# Patient Record
Sex: FEMALE | Race: WHITE | NOT HISPANIC OR LATINO | Employment: FULL TIME | URBAN - METROPOLITAN AREA
[De-identification: names, ages, dates, MRNs, and addresses within clinical notes are randomized per-mention and may not be internally consistent; named-entity substitution may affect disease eponyms.]

---

## 2017-01-19 ENCOUNTER — APPOINTMENT (OUTPATIENT)
Dept: LAB | Facility: HOSPITAL | Age: 62
End: 2017-01-19
Attending: PSYCHIATRY & NEUROLOGY
Payer: COMMERCIAL

## 2017-01-19 DIAGNOSIS — G35 MULTIPLE SCLEROSIS (HCC): ICD-10-CM

## 2017-01-19 LAB
BUN SERPL-MCNC: 15 MG/DL (ref 5–25)
CREAT SERPL-MCNC: 0.62 MG/DL (ref 0.6–1.3)
GFR SERPL CREATININE-BSD FRML MDRD: >60 ML/MIN/1.73SQ M

## 2017-01-19 PROCEDURE — 36415 COLL VENOUS BLD VENIPUNCTURE: CPT

## 2017-01-19 PROCEDURE — 84520 ASSAY OF UREA NITROGEN: CPT

## 2017-01-19 PROCEDURE — 82565 ASSAY OF CREATININE: CPT

## 2017-01-23 ENCOUNTER — HOSPITAL ENCOUNTER (OUTPATIENT)
Dept: RADIOLOGY | Facility: HOSPITAL | Age: 62
Discharge: HOME | End: 2017-01-23
Attending: PSYCHIATRY & NEUROLOGY
Payer: COMMERCIAL

## 2017-01-23 DIAGNOSIS — G35 MULTIPLE SCLEROSIS (HCC): ICD-10-CM

## 2017-01-23 PROCEDURE — A9585 GADOBUTROL INJECTION: HCPCS | Performed by: PSYCHIATRY & NEUROLOGY

## 2017-01-23 PROCEDURE — 72157 MRI CHEST SPINE W/O & W/DYE: CPT

## 2017-01-23 RX ADMIN — GADOBUTROL 8 ML: 604.72 INJECTION INTRAVENOUS at 12:04

## 2017-01-28 ENCOUNTER — TRANSCRIBE ORDERS (OUTPATIENT)
Dept: LAB | Facility: HOSPITAL | Age: 62
End: 2017-01-28

## 2017-01-28 ENCOUNTER — APPOINTMENT (OUTPATIENT)
Dept: LAB | Facility: HOSPITAL | Age: 62
End: 2017-01-28
Payer: COMMERCIAL

## 2017-01-28 DIAGNOSIS — G53 CRANIAL NERVE DISORDERS IN DISEASES CLASSIFIED ELSEWHERE: Primary | ICD-10-CM

## 2017-01-28 DIAGNOSIS — E78.00 PURE HYPERCHOLESTEROLEMIA: ICD-10-CM

## 2017-01-28 DIAGNOSIS — G53 CRANIAL NERVE DISORDERS IN DISEASES CLASSIFIED ELSEWHERE: ICD-10-CM

## 2017-01-28 LAB
25(OH)D3 SERPL-MCNC: 41.4 NG/ML (ref 30–100)
ALBUMIN SERPL BCP-MCNC: 3.9 G/DL (ref 3.5–5)
ALP SERPL-CCNC: 71 U/L (ref 46–116)
ALT SERPL W P-5'-P-CCNC: 48 U/L (ref 12–78)
ANION GAP SERPL CALCULATED.3IONS-SCNC: 7 MMOL/L (ref 4–13)
AST SERPL W P-5'-P-CCNC: 26 U/L (ref 5–45)
BASOPHILS # BLD AUTO: 0.02 THOUSANDS/ΜL (ref 0–0.1)
BASOPHILS NFR BLD AUTO: 0 % (ref 0–1)
BILIRUB SERPL-MCNC: 0.92 MG/DL (ref 0.2–1)
BUN SERPL-MCNC: 12 MG/DL (ref 5–25)
CALCIUM SERPL-MCNC: 8.9 MG/DL (ref 8.3–10.1)
CHLORIDE SERPL-SCNC: 104 MMOL/L (ref 100–108)
CHOLEST SERPL-MCNC: 214 MG/DL (ref 50–200)
CO2 SERPL-SCNC: 31 MMOL/L (ref 21–32)
CREAT SERPL-MCNC: 0.7 MG/DL (ref 0.6–1.3)
EOSINOPHIL # BLD AUTO: 0.11 THOUSAND/ΜL (ref 0–0.61)
EOSINOPHIL NFR BLD AUTO: 2 % (ref 0–6)
ERYTHROCYTE [DISTWIDTH] IN BLOOD BY AUTOMATED COUNT: 12.5 % (ref 11.6–15.1)
GFR SERPL CREATININE-BSD FRML MDRD: >60 ML/MIN/1.73SQ M
GLUCOSE SERPL-MCNC: 104 MG/DL (ref 65–140)
HCT VFR BLD AUTO: 41 % (ref 34.8–46.1)
HDLC SERPL-MCNC: 70 MG/DL (ref 40–60)
HGB BLD-MCNC: 14 G/DL (ref 11.5–15.4)
LDLC SERPL CALC-MCNC: 127 MG/DL (ref 0–100)
LYMPHOCYTES # BLD AUTO: 1.74 THOUSANDS/ΜL (ref 0.6–4.47)
LYMPHOCYTES NFR BLD AUTO: 37 % (ref 14–44)
MCH RBC QN AUTO: 32.6 PG (ref 26.8–34.3)
MCHC RBC AUTO-ENTMCNC: 34.1 G/DL (ref 31.4–37.4)
MCV RBC AUTO: 96 FL (ref 82–98)
MONOCYTES # BLD AUTO: 0.33 THOUSAND/ΜL (ref 0.17–1.22)
MONOCYTES NFR BLD AUTO: 7 % (ref 4–12)
NEUTROPHILS # BLD AUTO: 2.46 THOUSANDS/ΜL (ref 1.85–7.62)
NEUTS SEG NFR BLD AUTO: 54 % (ref 43–75)
NRBC BLD AUTO-RTO: 0 /100 WBCS
PLATELET # BLD AUTO: 237 THOUSANDS/UL (ref 149–390)
PMV BLD AUTO: 10 FL (ref 8.9–12.7)
POTASSIUM SERPL-SCNC: 4.2 MMOL/L (ref 3.5–5.3)
PROT SERPL-MCNC: 7.4 G/DL (ref 6.4–8.2)
RBC # BLD AUTO: 4.29 MILLION/UL (ref 3.81–5.12)
SODIUM SERPL-SCNC: 142 MMOL/L (ref 136–145)
TRIGL SERPL-MCNC: 87 MG/DL
TSH SERPL DL<=0.05 MIU/L-ACNC: 2.21 UIU/ML (ref 0.36–3.74)
WBC # BLD AUTO: 4.67 THOUSAND/UL (ref 4.31–10.16)

## 2017-01-28 PROCEDURE — 36415 COLL VENOUS BLD VENIPUNCTURE: CPT

## 2017-01-28 PROCEDURE — 82306 VITAMIN D 25 HYDROXY: CPT

## 2017-01-28 PROCEDURE — 85025 COMPLETE CBC W/AUTO DIFF WBC: CPT

## 2017-01-28 PROCEDURE — 80061 LIPID PANEL: CPT

## 2017-01-28 PROCEDURE — 84443 ASSAY THYROID STIM HORMONE: CPT

## 2017-01-28 PROCEDURE — 80053 COMPREHEN METABOLIC PANEL: CPT

## 2017-01-30 ENCOUNTER — ALLSCRIPTS OFFICE VISIT (OUTPATIENT)
Dept: OTHER | Facility: OTHER | Age: 62
End: 2017-01-30

## 2017-07-31 ENCOUNTER — ALLSCRIPTS OFFICE VISIT (OUTPATIENT)
Dept: OTHER | Facility: OTHER | Age: 62
End: 2017-07-31

## 2017-07-31 ENCOUNTER — TRANSCRIBE ORDERS (OUTPATIENT)
Dept: ADMINISTRATIVE | Facility: HOSPITAL | Age: 62
End: 2017-07-31

## 2017-07-31 DIAGNOSIS — R51 HEADACHE(784.0): ICD-10-CM

## 2017-07-31 DIAGNOSIS — R51.9 FACIAL PAIN: Primary | ICD-10-CM

## 2017-08-12 ENCOUNTER — TRANSCRIBE ORDERS (OUTPATIENT)
Dept: LAB | Facility: HOSPITAL | Age: 62
End: 2017-08-12

## 2017-08-12 ENCOUNTER — APPOINTMENT (OUTPATIENT)
Dept: LAB | Facility: HOSPITAL | Age: 62
End: 2017-08-12
Payer: COMMERCIAL

## 2017-08-12 DIAGNOSIS — E78.00 PURE HYPERCHOLESTEROLEMIA: Primary | ICD-10-CM

## 2017-08-12 DIAGNOSIS — I10 ESSENTIAL HYPERTENSION, MALIGNANT: ICD-10-CM

## 2017-08-12 DIAGNOSIS — E78.00 PURE HYPERCHOLESTEROLEMIA: ICD-10-CM

## 2017-08-12 DIAGNOSIS — R51 HEADACHE(784.0): ICD-10-CM

## 2017-08-12 LAB
ANION GAP SERPL CALCULATED.3IONS-SCNC: 5 MMOL/L (ref 4–13)
BUN SERPL-MCNC: 19 MG/DL (ref 5–25)
CALCIUM SERPL-MCNC: 8.9 MG/DL (ref 8.3–10.1)
CHLORIDE SERPL-SCNC: 103 MMOL/L (ref 100–108)
CHOLEST SERPL-MCNC: 198 MG/DL (ref 50–200)
CO2 SERPL-SCNC: 30 MMOL/L (ref 21–32)
CREAT SERPL-MCNC: 0.6 MG/DL (ref 0.6–1.3)
ERYTHROCYTE [SEDIMENTATION RATE] IN BLOOD: 5 MM/HOUR (ref 0–20)
GFR SERPL CREATININE-BSD FRML MDRD: 97 ML/MIN/1.73SQ M
GLUCOSE P FAST SERPL-MCNC: 90 MG/DL (ref 65–99)
HDLC SERPL-MCNC: 54 MG/DL (ref 40–60)
LDLC SERPL CALC-MCNC: 129 MG/DL (ref 0–100)
POTASSIUM SERPL-SCNC: 4.4 MMOL/L (ref 3.5–5.3)
SODIUM SERPL-SCNC: 138 MMOL/L (ref 136–145)
TRIGL SERPL-MCNC: 73 MG/DL

## 2017-08-12 PROCEDURE — 80061 LIPID PANEL: CPT

## 2017-08-12 PROCEDURE — 85652 RBC SED RATE AUTOMATED: CPT

## 2017-08-12 PROCEDURE — 80048 BASIC METABOLIC PNL TOTAL CA: CPT

## 2017-08-12 PROCEDURE — 36415 COLL VENOUS BLD VENIPUNCTURE: CPT

## 2018-01-13 VITALS
HEIGHT: 60 IN | SYSTOLIC BLOOD PRESSURE: 148 MMHG | WEIGHT: 181 LBS | RESPIRATION RATE: 16 BRPM | DIASTOLIC BLOOD PRESSURE: 75 MMHG | BODY MASS INDEX: 35.53 KG/M2 | HEART RATE: 75 BPM

## 2018-01-14 VITALS
RESPIRATION RATE: 16 BRPM | SYSTOLIC BLOOD PRESSURE: 126 MMHG | BODY MASS INDEX: 35.14 KG/M2 | HEIGHT: 60 IN | DIASTOLIC BLOOD PRESSURE: 74 MMHG | HEART RATE: 72 BPM | WEIGHT: 179 LBS

## 2018-01-30 ENCOUNTER — OFFICE VISIT (OUTPATIENT)
Dept: NEUROLOGY | Facility: CLINIC | Age: 63
End: 2018-01-30
Payer: COMMERCIAL

## 2018-01-30 VITALS
WEIGHT: 180.2 LBS | HEIGHT: 60 IN | RESPIRATION RATE: 16 BRPM | BODY MASS INDEX: 35.38 KG/M2 | HEART RATE: 72 BPM | SYSTOLIC BLOOD PRESSURE: 126 MMHG | DIASTOLIC BLOOD PRESSURE: 78 MMHG

## 2018-01-30 DIAGNOSIS — G35 MULTIPLE SCLEROSIS (HCC): Primary | ICD-10-CM

## 2018-01-30 DIAGNOSIS — F07.81 POSTCONCUSSION SYNDROME: ICD-10-CM

## 2018-01-30 PROCEDURE — 99214 OFFICE O/P EST MOD 30 MIN: CPT | Performed by: PSYCHIATRY & NEUROLOGY

## 2018-01-30 RX ORDER — CHLORAL HYDRATE 500 MG
CAPSULE ORAL 2 TIMES DAILY
COMMUNITY

## 2018-01-30 RX ORDER — LANSOPRAZOLE 30 MG/1
30 CAPSULE, DELAYED RELEASE ORAL DAILY
COMMUNITY
Start: 2015-10-26

## 2018-01-30 RX ORDER — IBUPROFEN 600 MG/1
600 TABLET ORAL EVERY 4 HOURS PRN
COMMUNITY
Start: 2015-05-14

## 2018-01-30 RX ORDER — ATROPA BELLADONNA, EUPHRASIA STRICTA AND MERCURIC CHLORIDE 6; 6; 6 [HP_X]/10ML; [HP_X]/10ML; [HP_X]/10ML
SOLUTION/ DROPS OPHTHALMIC 2 TIMES DAILY
COMMUNITY

## 2018-01-30 RX ORDER — LORATADINE 10 MG/1
10 CAPSULE, LIQUID FILLED ORAL AS NEEDED
COMMUNITY

## 2018-01-30 RX ORDER — FLUTICASONE PROPIONATE 50 MCG
SPRAY, SUSPENSION (ML) NASAL
COMMUNITY
Start: 2013-07-19 | End: 2020-01-30

## 2018-01-30 RX ORDER — CYANOCOBALAMIN (VITAMIN B-12) 500 MCG
LOZENGE ORAL
COMMUNITY

## 2018-01-30 RX ORDER — ACETAMINOPHEN 160 MG
2000 TABLET,DISINTEGRATING ORAL DAILY
COMMUNITY

## 2018-01-30 RX ORDER — METOPROLOL SUCCINATE 25 MG/1
25 TABLET, EXTENDED RELEASE ORAL
COMMUNITY

## 2018-01-30 RX ORDER — CYCLOSPORINE 0.5 MG/ML
0.05 EMULSION OPHTHALMIC 2 TIMES DAILY
COMMUNITY
Start: 2008-07-17

## 2018-01-30 RX ORDER — MAGNESIUM CARB/ALUMINUM HYDROX 105-160MG
TABLET,CHEWABLE ORAL DAILY
COMMUNITY

## 2018-01-30 RX ORDER — ALBUTEROL SULFATE 90 UG/1
1 AEROSOL, METERED RESPIRATORY (INHALATION) EVERY 4 HOURS PRN
COMMUNITY
Start: 2014-11-26

## 2018-01-30 RX ORDER — ACETYLCYST/METHYLB12/LEVOMEFOL 600-2-6 MG
TABLET ORAL DAILY
Refills: 1 | COMMUNITY
Start: 2018-01-09

## 2018-01-30 RX ORDER — CHROMIUM 200 MCG
TABLET ORAL
COMMUNITY
End: 2020-01-30

## 2018-01-30 RX ORDER — DULOXETIN HYDROCHLORIDE 60 MG/1
CAPSULE, DELAYED RELEASE ORAL
COMMUNITY
Start: 2013-10-01 | End: 2019-03-14 | Stop reason: ALTCHOICE

## 2018-01-30 NOTE — PROGRESS NOTES
Patient ID: Joseph Alvarez is a 57 y o  female  Assessment/Plan:    Multiple sclerosis ([facility] [address] )  Pt here for neuro follow up  Pt last seen in July 2017  Pt had updated imaging since last visit  Pt had updted mri c spine in aug 2017, no evidence of cervical cord myelopathy, extensive spondylosis with progression comp to the prior study  Mri brain also done in aug 2017, stable as well comp to sept 2015  Pt also recently seen by chiropractor and feels her neck is much improved with decreased pain the trapezius region  Pt to call for any new sxs    Postconcussion syndrome  Pt to call for any new sxs  Pt notes headaches improved  Pt now with better sleeping and eating also helping and increasing energy level       Diagnoses and all orders for this visit:    Multiple sclerosis ([facility] [address] )    Postconcussion syndrome    Other orders  -     albuterol (VENTOLIN HFA) 90 mcg/act inhaler; Reported on 12/7/2016   -     Jyivsucwz-Swjchvlkx-Gqwimdkojs (METAFOLBIC PLUS) 6-2-600 MG TABS; TK 1 T PO D  -     Cholecalciferol 2000 units CAPS;   -     cycloSPORINE (RESTASIS) 0 05 % ophthalmic emulsion; Apply 0 05 % to eye 2 (two) times a day  -     DULoxetine (CYMBALTA) 60 mg delayed release capsule; Take by mouth  -     fluticasone (FLONASE) 50 mcg/act nasal spray; into each nostril  -     Glucosamine-Chondroitin 750-600 MG TABS; Take by mouth  -     ibuprofen (MOTRIN) 600 mg tablet; Take by mouth  -     ACIDOPHILUS LACTOBACILLUS PO;   -     lansoprazole (PREVACID) 30 mg capsule; 15 mg  -     L-Lysine 500 MG CAPS; Take by mouth  -     Loratadine 10 MG CAPS; Take by mouth as needed  -     metoprolol succinate (TOPROL-XL) 25 mg 24 hr tablet; Take by mouth  -     Multiple Vitamins-Minerals (MULTIVITAMIN ADULT PO);   -     Omega-3 1000 MG CAPS;  Take by mouth  -     Homeopathic Products (SIMILASAN DRY EYE RELIEF) SOLN; Apply to eye  -     Vitamin E 400 units TABS; Take by mouth  -     Digestive Enzymes (DIGESTIVE ENZYME ULTRA PO); by Does not apply route  -     Cholecalciferol (VITAMIN D3) 2000 units capsule; Take by mouth  -     Turmeric Curcumin 500 MG CAPS; Take by mouth           Subjective:    HPI  HPI: 58 y/o female presents for neurologic follow up, last seen July 2017  Patient with PMH of post concussive syndrome due to multiple injuries in the past, MS with a fairly benign course  She is not on any IMD therapy  Patient with memory and concentration difficulties  Re rev prior  MRI c-spine from January 2016 compared to Nov 2014  Stable MR appearance of the cervical cord  Minor prominence of the central canal  No pathological enhancement  Mild cervical spondylosis and osteoarthritis  Re rev last MRI t-spine on 1/23/17 compared to 9/2015  Stable appearance of the thoracic cord  Prominent central canal T7-T9  No indication of demyelinating disease  Re rev MRI brain done in Sept 2015 and stable compared to Nov 2014 with mild degree of chronic demyelinating disease  Today, patient reports she is overall doing well  She remains off IMD therapy for her MS  Patient reports mental status and memory are about the same  She continues with word finding difficulty, slower cognition  She notes overall improved  sleep hygiene  She is working on better sleep hygiene , getting into her bed and trying to avoid distractions  Pt also trying to expand her social [place] as well ,  She just visited long time friend in [facility]  Pt notes overall doing better since last visit  Pt also recently seen by chiropractor who helped her with her trapezius spams  Headaches improved since last visit as well  No falls or trips  No changes in vision  No change in speech or swallowing  No loc  Once she goes to bed, she falls asleep easily and always stays asleep  Patient denies any changes in bowel or bladder  No diplopia  No loss of vision  No fever or chills           The following portions of the patient's history were reviewed and updated as appropriate: allergies, current medications, past family history, past medical history, past social history, past surgical history and problem list          Objective:    Blood pressure 126/78, pulse 72, resp  rate 16, height 5' (1 524 m), weight 81 7 kg (180 lb 3 2 oz)  Physical Exam   Constitutional: She appears well-developed and well-nourished  Eyes: EOM are normal  Pupils are equal, round, and reactive to light  Cardiovascular: Intact distal pulses  Neurological: She has normal strength  Gait normal        Neurological Exam    Mental Status  The patient is alert and oriented to person, place, time, and situation  Her recent and remote memory are normal  Her language is fluent with no aphasia  She has a normal fund of knowledge  Cranial Nerves    CN II: The patient's visual acuity and visual fields are normal   CN III, IV, VI: The patient's pupils are equally round and reactive to light and ocular movements are normal   CN V: The patient has normal facial sensation  CN VII:  The patient has symmetric facial movement  CN VIII:  The patient's hearing is normal   CN IX, X: The patient has symmetric palate movement and normal gag reflex  CN XI: The patient's shoulder shrug strength is normal   CN XII: The patient's tongue is midline without atrophy or fasciculations  Motor  The patient has normal muscle bulk throughout  Her overall muscle tone is normal throughout  Her strength is 5/5 throughout all four extremities  Sensory  The patient's sensation is normal in all four extremities to light touch, pinprick, temperature, vibration and proprioception  Gait and Coordination  The patient has normal gait and station  She has normal right heel to shin and normal left heel to shin coordination  She has normal right finger to nose and normal left finger to nose coordination  ROS:    Review of Systems   Constitutional: Negative  Negative for appetite change and fever  HENT: Positive for tinnitus and trouble swallowing  Negative for hearing loss and voice change  Eyes: Negative for photophobia and pain  Dry eyes   Respiratory: Positive for shortness of breath  Cardiovascular: Negative  Negative for palpitations  Gastrointestinal: Negative  Negative for nausea and vomiting  Endocrine: Negative  Negative for cold intolerance and heat intolerance  Genitourinary: Negative  Negative for dysuria, frequency and urgency  Musculoskeletal: Positive for arthralgias, gait problem and neck pain  Negative for myalgias  Clumsiness   Skin: Negative  Negative for rash  Neurological: Positive for light-headedness and headaches  Negative for dizziness, tremors, seizures, syncope, facial asymmetry, speech difficulty, weakness and numbness  Hematological: Negative  Does not bruise/bleed easily  Psychiatric/Behavioral: Positive for sleep disturbance  Negative for confusion and hallucinations          Memory problems  snoring

## 2018-01-30 NOTE — ASSESSMENT & PLAN NOTE
Pt to call for any new sxs  Pt notes headaches improved  Pt now with better sleeping and eating also helping and increasing energy level

## 2018-01-30 NOTE — PATIENT INSTRUCTIONS
Multiple sclerosis ([facility] [address] )  Pt here for neuro follow up  Pt last seen in July 2017  Pt had updated imaging since last visit  Pt had updted mri c spine in aug 2017, no evidence of cervical cord myelopathy, extensive spondylosis with progression comp to the prior study  Mri brain also done in aug 2017, stable as well comp to sept 2015  Pt also recently seen by chiropractor and feels her neck is much improved with decreased pain the trapezius region  Pt to call for any new sxs    Postconcussion syndrome  Pt to call for any new sxs  Pt notes headaches improved  Pt now with better sleeping and eating also helping and increasing energy level

## 2018-01-30 NOTE — ASSESSMENT & PLAN NOTE
Pt here for neuro follow up  Pt last seen in July 2017  Pt had updated imaging since last visit  Pt had updted mri c spine in aug 2017, no evidence of cervical cord myelopathy, extensive spondylosis with progression comp to the prior study  Mri brain also done in aug 2017, stable as well comp to sept 2015  Pt also recently seen by chiropractor and feels her neck is much improved with decreased pain the trapezius region  Pt to call for any new sxs

## 2018-03-06 ENCOUNTER — TELEPHONE (OUTPATIENT)
Dept: NEUROLOGY | Facility: CLINIC | Age: 63
End: 2018-03-06

## 2018-03-06 NOTE — TELEPHONE ENCOUNTER
May need to discuss with PCP  Looks like Dr Mendy Gomes just saw her about a month ago and all stable, even mentions a lot had improved  We can't put her out on disability for fear of losing her job due to layoffs if [address] has been stable
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
## 2018-03-08 NOTE — TELEPHONE ENCOUNTER
Pt states that she agrees, and that she just wanted to give us a heads up because she is applying for social security because she is almost 70, so we may be receiving a request for records  Can close

## 2019-01-08 ENCOUNTER — OFFICE VISIT (OUTPATIENT)
Dept: NEUROLOGY | Facility: CLINIC | Age: 64
End: 2019-01-08
Payer: COMMERCIAL

## 2019-01-08 ENCOUNTER — OFFICE VISIT (OUTPATIENT)
Dept: URGENT CARE | Facility: MEDICAL CENTER | Age: 64
End: 2019-01-08
Payer: COMMERCIAL

## 2019-01-08 VITALS
HEART RATE: 82 BPM | WEIGHT: 181 LBS | HEIGHT: 60 IN | SYSTOLIC BLOOD PRESSURE: 180 MMHG | DIASTOLIC BLOOD PRESSURE: 90 MMHG | BODY MASS INDEX: 35.53 KG/M2

## 2019-01-08 VITALS
OXYGEN SATURATION: 97 % | RESPIRATION RATE: 20 BRPM | SYSTOLIC BLOOD PRESSURE: 152 MMHG | TEMPERATURE: 99.4 F | HEART RATE: 84 BPM | DIASTOLIC BLOOD PRESSURE: 90 MMHG

## 2019-01-08 DIAGNOSIS — G47.09 OTHER INSOMNIA: Primary | ICD-10-CM

## 2019-01-08 DIAGNOSIS — G35 MULTIPLE SCLEROSIS (HCC): ICD-10-CM

## 2019-01-08 DIAGNOSIS — R41.3 AMNESIA/MEMORY DISORDER: ICD-10-CM

## 2019-01-08 DIAGNOSIS — F31.81 BIPOLAR 2 DISORDER (HCC): ICD-10-CM

## 2019-01-08 DIAGNOSIS — I10 HYPERTENSION, UNSPECIFIED TYPE: Primary | ICD-10-CM

## 2019-01-08 PROCEDURE — 99214 OFFICE O/P EST MOD 30 MIN: CPT | Performed by: PSYCHIATRY & NEUROLOGY

## 2019-01-08 PROCEDURE — G0383 LEV 4 HOSP TYPE B ED VISIT: HCPCS | Performed by: FAMILY MEDICINE

## 2019-01-08 NOTE — PROGRESS NOTES
[facility]        NAME: Fantasma Ngo is a 70 y o  female  : 1955    MRN: [id number]  DATE: 2019  TIME: 6:00 PM    Assessment and Plan   Hypertension, unspecified type [I10]  1  Hypertension, unspecified type       Manual blood pressure here was 172/88  Patient was told to take the blood pressure medication  Pressure was rechecked in 15 min and her new blood pressure was 152/90  Patient remains asymptomatic  Patient is a follow-up of primary care physician appointment this Friday  Patient was asked to follow up for hypertension and systolic murmur  Patient Instructions       Follow up with PCP in 3-5 days  Proceed to  ER if symptoms worsen  Chief Complaint     Chief Complaint   Patient presents with    Hypertension     sent from neuro for high blood pressure         History of Present Illness       78-year-old female with history of hypertension and multiple sclerosis presents with high blood pressure  She did not take her metoprolol today because she was busy running from doctor to doctor appointment  She was at Urology office were pressure was noted to be high and was sent here for assessment  She denies any symptoms however states that she "feels the high blood pressure "  She can't explain what that means  She denies any lightheadedness dizziness headaches  Denies any neurological symptoms like changes in her vision changes in her hearing slurring of her speech  She denies any chest pain, palpitations shortness of breath          Review of Systems   Review of Systems  As above    Current Medications       Current Outpatient Prescriptions:     ACIDOPHILUS LACTOBACILLUS PO, , Disp: , Rfl:     albuterol (VENTOLIN HFA) 90 mcg/act inhaler, Reported on 2016 , Disp: , Rfl:     Cholecalciferol (VITAMIN D3) 2000 units capsule, Take by mouth, Disp: , Rfl:     Cholecalciferol 2000 units CAPS, , Disp: , Rfl:     cycloSPORINE (RESTASIS) 0 05 % ophthalmic emulsion, Apply 0 05 % to eye 2 (two) times a day, Disp: , Rfl:     Digestive Enzymes (DIGESTIVE ENZYME ULTRA PO), by Does not apply route, Disp: , Rfl:     DULoxetine (CYMBALTA) 60 mg delayed release capsule, Take by mouth, Disp: , Rfl:     fluticasone (FLONASE) 50 mcg/act nasal spray, into each nostril, Disp: , Rfl:     Glucosamine-Chondroitin 750-600 MG TABS, Take by mouth, Disp: , Rfl:     Homeopathic Products (SIMILASAN DRY EYE RELIEF) SOLN, Apply to eye, Disp: , Rfl:     ibuprofen (MOTRIN) 600 mg tablet, Take by mouth, Disp: , Rfl:     L-Lysine 500 MG CAPS, Take by mouth, Disp: , Rfl:     lansoprazole (PREVACID) 30 mg capsule, 15 mg, Disp: , Rfl:     Loratadine 10 MG CAPS, Take by mouth as needed, Disp: , Rfl:     Tlybuwjuv-Yhesykngw-Yepfpvfnlk (METAFOLBIC PLUS) 6-2-600 MG TABS, TK 1 T PO D, Disp: , Rfl: 1    metoprolol succinate (TOPROL-XL) 25 mg 24 hr tablet, Take by mouth, Disp: , Rfl:     Multiple Vitamins-Minerals (MULTIVITAMIN ADULT PO), , Disp: , Rfl:     Omega-3 1000 MG CAPS, Take by mouth, Disp: , Rfl:     Turmeric Curcumin 500 MG CAPS, Take by mouth, Disp: , Rfl:     Vitamin E 400 units TABS, Take by mouth, Disp: , Rfl:     Current Allergies     Allergies as of 01/08/2019 - Reviewed 01/08/2019   Allergen Reaction Noted    Bee venom  12/02/2015    Latex  12/16/2013    Sulfa antibiotics  12/02/2015            The following portions of the patient's history were reviewed and updated as appropriate: allergies, current medications, past family history, past medical history, past social history, past surgical history and problem list      No past medical history on file  No past surgical history on file  No family history on file  Medications have been verified  Objective   BP (!) 172/88   Pulse 84   Temp 99 4 °F (37 4 °C)   Resp 20   SpO2 97%        Physical Exam     Physical Exam   Constitutional: She is oriented to person, place, and time   She appears well-developed and well-nourished  HENT:   Head: Normocephalic and atraumatic  Mouth/Throat: Oropharynx is clear and moist    Eyes: Conjunctivae are normal    Neck: Neck supple  Cardiovascular: Normal rate and regular rhythm  Murmur heard  Pulmonary/Chest: Effort normal and breath sounds normal  No respiratory distress  She has no wheezes  She has no rales  Abdominal: Soft  Musculoskeletal: Normal range of motion  Neurological: She is alert and oriented to person, place, and time  Skin: Skin is warm and dry  Psychiatric: She has a normal mood and affect  Her behavior is normal    Nursing note and vitals reviewed

## 2019-01-08 NOTE — ASSESSMENT & PLAN NOTE
Pt here for neuro follow up  Pt notes overall no new sxs over the past yr  Pt last seen in jan 2018  Pt still working 8-9 hours daily  Pt notes working at same place for 14 yrs  Pt with increased diff with maintaince of sleep  Pt notes sleeping only about 2-3 hours per night  Pt recommended to see sleep medicine  Pt needs sleep consultation plus probable testing  Pt also volunteering at her [employer] the community  Pt exam stable  Pt recommended to do PT   Pt has a gym at her home but too hot on the 3rd floor at times  Pt now with central air, rec resumption of exercise  Also offered PT referral  Pt to continue with seeing therapist as well  Pt told she is likely hypomanic from bipolar type 2  Also rec pt to see psychiatry  Pt with elevated bp times 2 in office today at 180/90  Pt did not take her afternoon metoprolol today  Unsure how long bp has been elevated, rec pt to be seen at [facility] today right across the road for prompter evaluation  Pt is seeing her pcp on Friday but rec this issue to be addressed now as late in afternoon

## 2019-01-08 NOTE — PATIENT INSTRUCTIONS
Multiple sclerosis ([facility] [address] )  Pt here for neuro follow up  Pt notes overall no new sxs over the past yr  Pt last seen in jan 2018  Pt still working 8-9 hours daily  Pt notes working at same place for 14 yrs  Pt with increased diff with maintaince of sleep  Pt notes sleeping only about 2-3 hours per night  Pt recommended to see sleep medicine  Pt needs sleep consultation plus probable testing  Pt also volunteering at her [employer] the community  Pt exam stable  Pt recommended to do PT   Pt has a gym at her home but too hot on the 3rd floor at times  Pt now with central air, rec resumption of exercise  Also offered PT referral

## 2019-01-09 ENCOUNTER — TELEPHONE (OUTPATIENT)
Dept: NEUROLOGY | Facility: CLINIC | Age: 64
End: 2019-01-09
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
## 2019-01-09 NOTE — TELEPHONE ENCOUNTER
Pt seen in office last gabe and sent to [facility] for her elevated bp  Pt also with probable bipolar disorder per her therapist   We see her for her MS  Pt recommended to see psychiatry as well  Please help to coordinate getting pt to see psychiatry  Also please tell pt I placed an order for neurocogntive eval with dr Patrice Mckeon for re eval of her memory due to her therapist recommendations, her ms and concussion in past   Please check with pt if she had a prior one done  Not sure if in allrxs? ?  But it would be helpful to compare  Also remind her to follow up with pcp due to her bp issue last gabe  Pt has an appt on Friday with pcp

## 2019-01-14 ENCOUNTER — APPOINTMENT (OUTPATIENT)
Dept: LAB | Facility: HOSPITAL | Age: 64
End: 2019-01-14
Payer: COMMERCIAL

## 2019-01-14 ENCOUNTER — TRANSCRIBE ORDERS (OUTPATIENT)
Dept: LAB | Facility: HOSPITAL | Age: 64
End: 2019-01-14

## 2019-01-14 DIAGNOSIS — E78.00 PURE HYPERCHOLESTEROLEMIA: ICD-10-CM

## 2019-01-14 DIAGNOSIS — G35 MULTIPLE SCLEROSIS (HCC): ICD-10-CM

## 2019-01-14 DIAGNOSIS — E78.00 PURE HYPERCHOLESTEROLEMIA: Primary | ICD-10-CM

## 2019-01-14 LAB
25(OH)D3 SERPL-MCNC: 48.4 NG/ML (ref 30–100)
ALBUMIN SERPL BCP-MCNC: 4 G/DL (ref 3.5–5)
ALP SERPL-CCNC: 64 U/L (ref 46–116)
ALT SERPL W P-5'-P-CCNC: 26 U/L (ref 12–78)
ANION GAP SERPL CALCULATED.3IONS-SCNC: 5 MMOL/L (ref 4–13)
AST SERPL W P-5'-P-CCNC: 18 U/L (ref 5–45)
BASOPHILS # BLD AUTO: 0.05 THOUSANDS/ΜL (ref 0–0.1)
BASOPHILS NFR BLD AUTO: 1 % (ref 0–1)
BILIRUB SERPL-MCNC: 0.86 MG/DL (ref 0.2–1)
BUN SERPL-MCNC: 21 MG/DL (ref 5–25)
CALCIUM SERPL-MCNC: 9.1 MG/DL (ref 8.3–10.1)
CHLORIDE SERPL-SCNC: 102 MMOL/L (ref 100–108)
CHOLEST SERPL-MCNC: 188 MG/DL (ref 50–200)
CO2 SERPL-SCNC: 32 MMOL/L (ref 21–32)
CREAT SERPL-MCNC: 0.76 MG/DL (ref 0.6–1.3)
EOSINOPHIL # BLD AUTO: 0.16 THOUSAND/ΜL (ref 0–0.61)
EOSINOPHIL NFR BLD AUTO: 3 % (ref 0–6)
ERYTHROCYTE [DISTWIDTH] IN BLOOD BY AUTOMATED COUNT: 11.7 % (ref 11.6–15.1)
GFR SERPL CREATININE-BSD FRML MDRD: 83 ML/MIN/1.73SQ M
GLUCOSE P FAST SERPL-MCNC: 89 MG/DL (ref 65–99)
HCT VFR BLD AUTO: 39.4 % (ref 34.8–46.1)
HDLC SERPL-MCNC: 58 MG/DL (ref 40–60)
HGB BLD-MCNC: 13 G/DL (ref 11.5–15.4)
IMM GRANULOCYTES # BLD AUTO: 0.01 THOUSAND/UL (ref 0–0.2)
IMM GRANULOCYTES NFR BLD AUTO: 0 % (ref 0–2)
LDLC SERPL CALC-MCNC: 118 MG/DL (ref 0–100)
LYMPHOCYTES # BLD AUTO: 2.25 THOUSANDS/ΜL (ref 0.6–4.47)
LYMPHOCYTES NFR BLD AUTO: 39 % (ref 14–44)
MCH RBC QN AUTO: 32.3 PG (ref 26.8–34.3)
MCHC RBC AUTO-ENTMCNC: 33 G/DL (ref 31.4–37.4)
MCV RBC AUTO: 98 FL (ref 82–98)
MONOCYTES # BLD AUTO: 0.42 THOUSAND/ΜL (ref 0.17–1.22)
MONOCYTES NFR BLD AUTO: 7 % (ref 4–12)
NEUTROPHILS # BLD AUTO: 2.94 THOUSANDS/ΜL (ref 1.85–7.62)
NEUTS SEG NFR BLD AUTO: 50 % (ref 43–75)
NONHDLC SERPL-MCNC: 130 MG/DL
NRBC BLD AUTO-RTO: 0 /100 WBCS
PLATELET # BLD AUTO: 207 THOUSANDS/UL (ref 149–390)
PMV BLD AUTO: 10.1 FL (ref 8.9–12.7)
POTASSIUM SERPL-SCNC: 4.5 MMOL/L (ref 3.5–5.3)
PROT SERPL-MCNC: 7 G/DL (ref 6.4–8.2)
RBC # BLD AUTO: 4.02 MILLION/UL (ref 3.81–5.12)
SODIUM SERPL-SCNC: 139 MMOL/L (ref 136–145)
TRIGL SERPL-MCNC: 58 MG/DL
TSH SERPL DL<=0.05 MIU/L-ACNC: 1.87 UIU/ML (ref 0.36–3.74)
WBC # BLD AUTO: 5.83 THOUSAND/UL (ref 4.31–10.16)

## 2019-01-14 PROCEDURE — 82306 VITAMIN D 25 HYDROXY: CPT

## 2019-01-14 PROCEDURE — 36415 COLL VENOUS BLD VENIPUNCTURE: CPT

## 2019-01-14 PROCEDURE — 84443 ASSAY THYROID STIM HORMONE: CPT

## 2019-01-14 PROCEDURE — 80061 LIPID PANEL: CPT

## 2019-01-14 PROCEDURE — 85025 COMPLETE CBC W/AUTO DIFF WBC: CPT

## 2019-01-14 PROCEDURE — 80053 COMPREHEN METABOLIC PANEL: CPT

## 2019-01-18 ENCOUNTER — OFFICE VISIT (OUTPATIENT)
Dept: SLEEP CENTER | Facility: CLINIC | Age: 64
End: 2019-01-18
Payer: COMMERCIAL

## 2019-01-18 VITALS
WEIGHT: 181 LBS | HEIGHT: 60 IN | HEART RATE: 64 BPM | SYSTOLIC BLOOD PRESSURE: 160 MMHG | DIASTOLIC BLOOD PRESSURE: 80 MMHG | BODY MASS INDEX: 35.53 KG/M2

## 2019-01-18 DIAGNOSIS — R06.83 SNORING: Primary | ICD-10-CM

## 2019-01-18 DIAGNOSIS — G47.19 EXCESSIVE DAYTIME SLEEPINESS: ICD-10-CM

## 2019-01-18 DIAGNOSIS — Z72.820 SLEEP DEPRIVATION: ICD-10-CM

## 2019-01-18 DIAGNOSIS — G47.09 OTHER INSOMNIA: ICD-10-CM

## 2019-01-18 DIAGNOSIS — I10 HYPERTENSION, UNSPECIFIED TYPE: ICD-10-CM

## 2019-01-18 PROCEDURE — 99244 OFF/OP CNSLTJ NEW/EST MOD 40: CPT | Performed by: PSYCHIATRY & NEUROLOGY

## 2019-01-18 NOTE — PATIENT INSTRUCTIONS
Recommendations:  1) In lab diagnostic Polysomnography   2) Driving safety was reviewed with patient  If the patient feels too sleepy to drive she knows not to drive  If she becomes sleepy while driving she will pull over and nap  3) Follow-up 6-8 weeks after initiating treatment if needed  4) Call with any questions or concerns  5) sleep extension 7-8 hours a night    Recommendations for sleep hygiene:    1) Get up at the same time seven days out of the week  2) Only go to bed when feeling sleepy  3) Wind down in the evening without electronics  4) Stimulus Control: If lying in bed for 15-20 minutes (estimated because the clock is turned away so you cannot see it) and you are not asleep get up and do something relaxing in a different room (reading a magazine article, solitaire with a deck of cards)  Do this in the middle of the night as well if awake  Avoid doing work or getting on the computer  5) Bedroom for sleep only  No watching TV or using electronics (computer, phone, tablet etc )  in bed  6) Turn clock away so you cannot see it in bed  7) Exercise regularly but try to avoid exercise within 4 hours of bedtime  Morning exercise is best     8) Avoid caffeine in the afternoon  Considering tapering down on caffeine by decreasing by one beverage with caffeine every 3 days until off  9) Avoid smoking near bedtime    10) Avoid alcohol before bed  If you consume one alcoholic beverage allow 3 hours between that drink and bedtime  If you consume two alcoholic beverages allow 5 hours  Between those drinks and bedtime  Alcohol may lead to waking at night  11) Avoid napping except for driving safety  If you feel to sleepy to drive do not drive  If you get sleepy while driving pull over and nap  You may resume driving once you feel alert  12) Read "No More Sleepless Nights" by Jose Vanegas PhD     13) There are some on-line resources that do require a fee that can be of help  Two credible websites are as follows:  http://Patient Feed/cbt-online-insomnia-treatment html  [employer] si  An johnny used by the [place] is as follows:  CBT-I   Go! To Sleep by the [facility]
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
## 2019-03-13 DIAGNOSIS — R06.83 SNORING: Primary | ICD-10-CM

## 2019-03-14 ENCOUNTER — OFFICE VISIT (OUTPATIENT)
Dept: PSYCHIATRY | Facility: CLINIC | Age: 64
End: 2019-03-14
Payer: COMMERCIAL

## 2019-03-14 DIAGNOSIS — F31.81 BIPOLAR 2 DISORDER (HCC): Primary | ICD-10-CM

## 2019-03-14 PROBLEM — F31.10 BIPOLAR AFFECTIVE, MANIC (HCC): Status: ACTIVE | Noted: 2019-03-14

## 2019-03-14 PROBLEM — F30.8 HYPOMANIA (HCC): Status: ACTIVE | Noted: 2019-03-14

## 2019-03-14 PROCEDURE — 99214 OFFICE O/P EST MOD 30 MIN: CPT | Performed by: NURSE PRACTITIONER

## 2019-03-14 RX ORDER — ARIPIPRAZOLE 5 MG/1
5 TABLET ORAL DAILY
Qty: 30 TABLET | Refills: 2 | Status: SHIPPED | OUTPATIENT
Start: 2019-03-14 | End: 2019-03-21

## 2019-03-14 RX ORDER — CLONAZEPAM 0.5 MG/1
0.25 TABLET ORAL 2 TIMES DAILY
Qty: 30 TABLET | Refills: 0 | Status: SHIPPED | OUTPATIENT
Start: 2019-03-14 | End: 2019-04-02 | Stop reason: SDUPTHER

## 2019-03-14 RX ORDER — ARIPIPRAZOLE 2 MG/1
TABLET ORAL
Qty: 30 TABLET | Refills: 0 | Status: SHIPPED | OUTPATIENT
Start: 2019-03-14 | End: 2019-03-14 | Stop reason: DRUGHIGH

## 2019-03-14 RX ORDER — DULOXETIN HYDROCHLORIDE 30 MG/1
30 CAPSULE, DELAYED RELEASE ORAL DAILY
Qty: 30 CAPSULE | Refills: 0 | OUTPATIENT
Start: 2019-03-14 | End: 2019-03-21 | Stop reason: ALTCHOICE

## 2019-03-14 RX ORDER — ESCITALOPRAM OXALATE 5 MG/1
5 TABLET ORAL DAILY
Qty: 30 TABLET | Refills: 0 | Status: SHIPPED | OUTPATIENT
Start: 2019-03-14 | End: 2019-03-14 | Stop reason: ALTCHOICE

## 2019-03-14 NOTE — PSYCH
[address]    Name and Date of Birth:  Jason Nesbitt 70 y o  1955    Date of Visit: 03/14/19    Reason for visit:   Chief Complaint   Patient presents with    Anxiety    Mood Swings    Behavior Issues    Medication Management     HPI patient is a 47-year-old female who is referred by her family doctor and Neurology  She presents today hypomanic  She is pressured and tangential speech, she is not able to keep a thought in order and she is having racing intrusive thoughts  My suspicion is that she is on Cymbalta 60 mg daily and this is for history of depression but there is also history of bipolar disorder and I believe she is bipolar to manic at this time  So we are going to eliminate Cymbalta I am starting her on Lexapro 5 mg daily along with the Abilify 2 mg daily and bring her back in 1 week for further evaluation and to see if her in the right direction with the medications  Reason were starting with low-dose Abilify is because she tells me I am sensitive to all meds and   She has been on Seroquel 50 mg at bedtime and she is reporting multiple side effects from that medication  She is able to function at work and she is able to drive a vehicle and she had no problems with getting here with directions  Sleep has been an issue for obvious reasons  There is no agitation there is no aggression evident  Jolanta Arias is a 70 y o  female with a history of bipolar disorder type II who presents for psychiatric evaluation due to manic symptoms  Primary complaints include MANIC SYMPTOMS: elevated mood, racing thoughts  Symptoms first started gradual starting several days ago and gradually worsening over the last several days  Stressors preceding visit included chronic mental illness  Jolanta Arias presents with hypomania which needs treatment    Cymbalta seems to be the culprit in increasing the hypomania sore going to discontinue that and were going to start trial of low-dose Lexapro and low-dose Abilify and evaluate in 1 week       She denies suicidal ideation, intent or plan at present, denies passive death wish recently  She denies auditory hallucinations, denies visual hallucinations, denies delusions  She denies any side effects from medications  Servando Patricia HPI ROS Appetite Changes and Sleep: normal sleep, fluctuating sleep pattern, normal appetite, normal energy level    Psychiatric Review Of Systems:    Sleep changes: yes  Appetite changes: no  Weight changes: no  Energy/anergy: increased  Interest/pleasure/anhedonia: increased  Somatic symptoms: no  Anxiety/panic: yes  Jessica: yes  Guilty/hopeless: no  Self injurious behavior/risky behavior: no  Suicidal ideation: no  Homicidal ideation: no  Auditory hallucinations: no  Visual hallucinations: no  Other hallucinations: no  Delusional thinking: no  Eating disorder history: no  Obsessive/compulsive symptoms: compulsions    Review Of Systems:    Mood Euphoria   Behavior Impulsive Behavior   Thought Content Racing   General Bipolar 2   Personality Normal   Other Psych Symptoms No   Constitutional negative   ENT negative   Cardiovascular negative   Respiratory negative   Gastrointestinal negative   Genitourinary negative   Musculoskeletal negative   Integumentary negative   Neurological negative   Endocrine negative   Other Symptoms negative and none       Past Psychiatric History:     Past Inpatient Psychiatric Treatment:   No history of past inpatient psychiatric admissions  Past Outpatient Psychiatric Treatment:    No history of past outpatient psychiatric treatment  Past Suicide Attempts: no  Past Violent Behavior: no  Past Psychiatric Medication Trials: Cymbalta    Family Psychiatric History:     No family history on file      Social History     Substance and Sexual Activity   Drug Use No     Social History     Socioeconomic History    Marital status: Single     Spouse name: Not on file    Number of children: Not on file    Years of education: Not on file    Highest education level: Not on file   Occupational History    Not on file   Social Needs    Financial resource strain: Not on file    Food insecurity:     Worry: Not on file     Inability: Not on file    Transportation needs:     Medical: Not on file     Non-medical: Not on file   Tobacco Use    Smoking status: Never Smoker    Smokeless tobacco: Never Used   Substance and Sexual Activity    Alcohol use: Yes     Comment: very infrequent    Drug use: No    Sexual activity: Not on file   Lifestyle    Physical activity:     Days per week: Not on file     Minutes per session: Not on file    Stress: Not on file   Relationships    Social connections:     Talks on phone: Not on file     Gets together: Not on file     Attends Restoration service: Not on file     Active member of club or organization: Not on file     Attends meetings of clubs or organizations: Not on file     Relationship status: Not on file    Intimate partner violence:     Fear of current or ex partner: Not on file     Emotionally abused: Not on file     Physically abused: Not on file     Forced sexual activity: Not on file   Other Topics Concern    Not on file   Social History Narrative    Not on file     Social History     Social History Narrative    Not on file       Traumatic History:     Abuse: None  Other Traumatic Events: none    History Review:    normal bulk    OBJECTIVE:     Mental Status Evaluation:    Appearance casually dressed   Behavior pleasant, cooperative   Speech increased rate   Mood hypomanic   Affect expansive   Thought Processes disorganized   Associations tangential associations   Thought Content obsessive thoughts   Perceptual Disturbances: none   Abnormal Thoughts  Risk Potential Suicidal ideation - None  Homicidal ideation - None  Potential for aggression - No   Orientation oriented to person, place and time/date   Memory recent and remote memory grossly intact   Cosciousness alert and awake   Attention Span poor concentration and poor attention span   Intellect Appears to be of Average Intelligence   Insight fair   Judgement fair   Muscle Strength and  Gait normal muscle strength and normal muscle tone, normal gait and normal balance   Language no difficulty naming common objects   Fund of Knowledge displays adequate knowledge of current events   Pain none   Pain Scale 0       Laboratory Results: No results found for this or any previous visit  Assessment/Plan:      Diagnoses and all orders for this visit:    Bipolar 2 disorder ([facility] [address] )  -     escitalopram (LEXAPRO) 5 mg tablet; Take 1 tablet (5 mg total) by mouth daily  -     ARIPiprazole (ABILIFY) 2 mg tablet; Take at 5 PM          Treatment Recommendations/Precautions:    Start Lexapro 5 mg daily  Abilify 2 mg daily  Follow-up in 1 weeks or sooner if necessary    Risks/Benefits      Risks, Benefits And Possible Side Effects Of Medications:    Risks, benefits, and possible side effects of medications explained to patient and patient verbalizes understanding and agreement for treatment      Controlled Medication Discussion:     Abida Fisher has been filling controlled prescriptions on time as prescribed according to [facility] P[facility]rez

## 2019-03-21 ENCOUNTER — OFFICE VISIT (OUTPATIENT)
Dept: PSYCHIATRY | Facility: CLINIC | Age: 64
End: 2019-03-21
Payer: COMMERCIAL

## 2019-03-21 DIAGNOSIS — F31.60 BIPOLAR 1 DISORDER, MIXED (HCC): Primary | ICD-10-CM

## 2019-03-21 PROBLEM — R47.89: Status: ACTIVE | Noted: 2019-03-21

## 2019-03-21 PROBLEM — F31.0 BIPOLAR AFFECTIVE DISORDER, CURRENT EPISODE HYPOMANIC (HCC): Status: ACTIVE | Noted: 2019-03-21

## 2019-03-21 PROBLEM — F31.10 BIPOLAR AFFECTIVE, MANIC (HCC): Status: RESOLVED | Noted: 2019-03-14 | Resolved: 2019-03-21

## 2019-03-21 PROBLEM — F30.8 HYPOMANIA (HCC): Status: RESOLVED | Noted: 2019-03-14 | Resolved: 2019-03-21

## 2019-03-21 PROCEDURE — 99214 OFFICE O/P EST MOD 30 MIN: CPT | Performed by: NURSE PRACTITIONER

## 2019-03-21 RX ORDER — ESCITALOPRAM OXALATE 5 MG/1
5 TABLET ORAL DAILY
Qty: 30 TABLET | Refills: 2 | OUTPATIENT
Start: 2019-03-21 | End: 2019-03-25 | Stop reason: ALTCHOICE

## 2019-03-21 RX ORDER — ARIPIPRAZOLE 2 MG/1
2 TABLET ORAL DAILY
Qty: 30 TABLET | Refills: 2 | OUTPATIENT
Start: 2019-03-21 | End: 2019-04-30 | Stop reason: SDUPTHER

## 2019-03-21 NOTE — PSYCH
PROGRESS NOTE        [facility]      Name and Date of Birth:  Wes Navas 70 y o  1955    Date of Visit: 03/21/19    SUBJECTIVE:    Myra Sacks presents today for treatment of bipolar depression  She had been on Cymbalta which was causing hypomania and she has titrated off the medication sooner than we had outlined but, she feels pretty good  She is sleeping better, she is functioning at work better, and her family is saying that this is significant improvement in her mood and her presentation  She did not start any of the other medication that we had discussed last week  She decided that she wanted to wait till she came in for this appointment  So today I gave her an outline to start Lexapro 5 mg daily, Abilify she will go with the 2 mg daily at her request   I explained to her that since her hypomania is under much better control, 2 mg may work but we may need to go up to 5 mg  She will continue with Klonopin 0 25 mg b i d  She will come back to see me in 3 weeks or sooner if necessary  She denies suicidal ideation, intent or plan at present, has no suicidal ideation, intent or plan at present  She denies any auditory hallucinations and visual hallucinations, denies any other delusional thinking, denies any delusional thinking  She denies any side effects from medications    HPI ROS Appetite Changes and Sleep: normal appetite, normal sleep    Review Of Systems:      Constitutional Negative   ENT Negative   Cardiovascular Negative   Respiratory As Noted in HPI   Gastrointestinal Negative   Genitourinary Negative   Musculoskeletal Negative   Integumentary Negative   Neurological Negative   Endocrine Negative   Other Symptoms Negative and None       Laboratory Results: No results found for this or any previous visit      Substance Abuse History:    Social History     Substance and Sexual Activity   Drug Use No       Family Psychiatric History: No family history on file      The following portions of the patient's history were reviewed and updated as appropriate: past family history, past medical history, past social history, past surgical history and problem list     Social History     Socioeconomic History    Marital status: Single     Spouse name: Not on file    Number of children: Not on file    Years of education: Not on file    Highest education level: Not on file   Occupational History    Not on file   Social Needs    Financial resource strain: Not on file    Food insecurity:     Worry: Not on file     Inability: Not on file    Transportation needs:     Medical: Not on file     Non-medical: Not on file   Tobacco Use    Smoking status: Never Smoker    Smokeless tobacco: Never Used   Substance and Sexual Activity    Alcohol use: Yes     Comment: very infrequent    Drug use: No    Sexual activity: Not on file   Lifestyle    Physical activity:     Days per week: Not on file     Minutes per session: Not on file    Stress: Not on file   Relationships    Social connections:     Talks on phone: Not on file     Gets together: Not on file     Attends Islam service: Not on file     Active member of club or organization: Not on file     Attends meetings of clubs or organizations: Not on file     Relationship status: Not on file    Intimate partner violence:     Fear of current or ex partner: Not on file     Emotionally abused: Not on file     Physically abused: Not on file     Forced sexual activity: Not on file   Other Topics Concern    Not on file   Social History Narrative    Not on file     Social History     Social History Narrative    Not on file        Social History     Tobacco History     Smoking Status  Never Smoker    Smokeless Tobacco Use  Never Used          Alcohol History     Alcohol Use Status  Yes Comment  very infrequent          Drug Use     Drug Use Status  No          Sexual Activity     Sexually Active  Not Asked Activities of Daily Living    Not Asked                     OBJECTIVE:     Mental Status Evaluation:    Appearance age appropriate, casually dressed   Behavior pleasant, cooperative   Speech normal volume, normal pitch   Mood improved   Affect Bright   Thought Processes Less disorganized   Associations intact associations   Thought Content Normal   Perceptual Disturbances: None   Abnormal Thoughts  Risk Potential Suicidal ideation - None  Homicidal ideation - None  Potential for aggression -No   Orientation oriented to person, place, time/date and situation   Memory recent and remote memory grossly intact   Cosciousness alert and awake   Attention Span attention span and concentration are age appropriate   Intellect Appears to be of Average Intelligence   Insight age appropriate and improved   Judgement good and improved   Muscle Strength and  Gait muscle strength and tone were normal   Language no difficulty naming common objects   Fund of Knowledge displays adequate knowledge of current events   Pain none   Pain Scale 0       Assessment/Plan:       Diagnoses and all orders for this visit:    Bipolar 1 disorder, mixed (HCC)  -     ARIPiprazole (ABILIFY) 2 mg tablet; Take 1 tablet (2 mg total) by mouth daily  -     escitalopram (LEXAPRO) 5 mg tablet; Take 1 tablet (5 mg total) by mouth daily          Treatment Recommendations/Precautions:    Continue current medications:  Abilify 2 mg daily  Lexapro 5 mg daily  Klonopin 0 25 mg b i d  Follow-up in 3 weeks or sooner if necessary  Risks/Benefits      Risks, Benefits And Possible Side Effects Of Medications:    Risks, benefits, and possible side effects of medications explained to patient and patient verbalizes understanding and agreement for treatment      Controlled Medication Discussion:     Not applicable    Psychotherapy Provided:     Individual psychotherapy provided: No

## 2019-03-25 ENCOUNTER — TELEPHONE (OUTPATIENT)
Dept: PSYCHIATRY | Facility: CLINIC | Age: 64
End: 2019-03-25

## 2019-03-29 ENCOUNTER — TELEPHONE (OUTPATIENT)
Dept: NEUROLOGY | Facility: CLINIC | Age: 64
End: 2019-03-29

## 2019-03-29 ENCOUNTER — TELEPHONE (OUTPATIENT)
Dept: PSYCHIATRY | Facility: CLINIC | Age: 64
End: 2019-03-29

## 2019-04-02 ENCOUNTER — OFFICE VISIT (OUTPATIENT)
Dept: PSYCHIATRY | Facility: CLINIC | Age: 64
End: 2019-04-02
Payer: COMMERCIAL

## 2019-04-02 DIAGNOSIS — F31.81 BIPOLAR 2 DISORDER (HCC): ICD-10-CM

## 2019-04-02 PROCEDURE — 99214 OFFICE O/P EST MOD 30 MIN: CPT | Performed by: NURSE PRACTITIONER

## 2019-04-02 RX ORDER — CLONAZEPAM 0.5 MG/1
0.25 TABLET ORAL 2 TIMES DAILY
Qty: 30 TABLET | Refills: 2 | Status: SHIPPED | OUTPATIENT
Start: 2019-04-02 | End: 2019-04-17

## 2019-04-04 ENCOUNTER — APPOINTMENT (OUTPATIENT)
Dept: LAB | Facility: HOSPITAL | Age: 64
End: 2019-04-04
Payer: COMMERCIAL

## 2019-04-04 ENCOUNTER — TRANSCRIBE ORDERS (OUTPATIENT)
Dept: ADMINISTRATIVE | Facility: HOSPITAL | Age: 64
End: 2019-04-04

## 2019-04-04 DIAGNOSIS — R82.90 ABNORMAL URINE FINDINGS: ICD-10-CM

## 2019-04-04 DIAGNOSIS — R22.40 LOCALIZED SWELLING, MASS, OR LUMP OF LOWER EXTREMITY, UNSPECIFIED LATERALITY: ICD-10-CM

## 2019-04-04 DIAGNOSIS — R22.40 LOCALIZED SWELLING, MASS, OR LUMP OF LOWER EXTREMITY, UNSPECIFIED LATERALITY: Primary | ICD-10-CM

## 2019-04-04 LAB
BACTERIA UR QL AUTO: ABNORMAL /HPF
BILIRUB UR QL STRIP: NEGATIVE
CLARITY UR: CLEAR
COLOR UR: YELLOW
GLUCOSE UR STRIP-MCNC: NEGATIVE MG/DL
HGB UR QL STRIP.AUTO: NEGATIVE
KETONES UR STRIP-MCNC: NEGATIVE MG/DL
LEUKOCYTE ESTERASE UR QL STRIP: ABNORMAL
NITRITE UR QL STRIP: NEGATIVE
NON-SQ EPI CELLS URNS QL MICRO: ABNORMAL /HPF
NT-PROBNP SERPL-MCNC: 46 PG/ML
PH UR STRIP.AUTO: 7.5 [PH]
PROT UR STRIP-MCNC: NEGATIVE MG/DL
RBC #/AREA URNS AUTO: ABNORMAL /HPF
SP GR UR STRIP.AUTO: 1.01 (ref 1–1.03)
UROBILINOGEN UR QL STRIP.AUTO: 0.2 E.U./DL
WBC #/AREA URNS AUTO: ABNORMAL /HPF

## 2019-04-04 PROCEDURE — 81001 URINALYSIS AUTO W/SCOPE: CPT | Performed by: INTERNAL MEDICINE

## 2019-04-04 PROCEDURE — 36415 COLL VENOUS BLD VENIPUNCTURE: CPT

## 2019-04-04 PROCEDURE — 83880 ASSAY OF NATRIURETIC PEPTIDE: CPT

## 2019-04-14 ENCOUNTER — HOSPITAL ENCOUNTER (OUTPATIENT)
Dept: SLEEP CENTER | Facility: CLINIC | Age: 64
Discharge: HOME | End: 2019-04-14
Payer: COMMERCIAL

## 2019-04-14 DIAGNOSIS — R06.83 SNORING: ICD-10-CM

## 2019-04-14 PROCEDURE — G0399 HOME SLEEP TEST/TYPE 3 PORTA: HCPCS | Performed by: PSYCHIATRY & NEUROLOGY

## 2019-04-14 PROCEDURE — G0399 HOME SLEEP TEST/TYPE 3 PORTA: HCPCS

## 2019-04-15 ENCOUNTER — APPOINTMENT (OUTPATIENT)
Dept: LAB | Facility: HOSPITAL | Age: 64
End: 2019-04-15
Payer: COMMERCIAL

## 2019-04-15 ENCOUNTER — TRANSCRIBE ORDERS (OUTPATIENT)
Dept: LAB | Facility: HOSPITAL | Age: 64
End: 2019-04-15

## 2019-04-15 ENCOUNTER — TELEPHONE (OUTPATIENT)
Dept: PSYCHIATRY | Facility: CLINIC | Age: 64
End: 2019-04-15

## 2019-04-15 DIAGNOSIS — Z51.81 ENCOUNTER FOR THERAPEUTIC DRUG MONITORING: Primary | ICD-10-CM

## 2019-04-15 DIAGNOSIS — Z51.81 ENCOUNTER FOR THERAPEUTIC DRUG MONITORING: ICD-10-CM

## 2019-04-15 LAB
ALBUMIN SERPL BCP-MCNC: 3.8 G/DL (ref 3.5–5)
ALP SERPL-CCNC: 75 U/L (ref 46–116)
ALT SERPL W P-5'-P-CCNC: 65 U/L (ref 12–78)
ANION GAP SERPL CALCULATED.3IONS-SCNC: 8 MMOL/L (ref 4–13)
AST SERPL W P-5'-P-CCNC: 26 U/L (ref 5–45)
BILIRUB SERPL-MCNC: 0.4 MG/DL (ref 0.2–1)
BUN SERPL-MCNC: 15 MG/DL (ref 5–25)
CALCIUM SERPL-MCNC: 9.7 MG/DL (ref 8.3–10.1)
CHLORIDE SERPL-SCNC: 103 MMOL/L (ref 100–108)
CO2 SERPL-SCNC: 29 MMOL/L (ref 21–32)
CREAT SERPL-MCNC: 0.63 MG/DL (ref 0.6–1.3)
EST. AVERAGE GLUCOSE BLD GHB EST-MCNC: 105 MG/DL
GFR SERPL CREATININE-BSD FRML MDRD: 94 ML/MIN/1.73SQ M
GLUCOSE SERPL-MCNC: 115 MG/DL (ref 65–140)
HBA1C MFR BLD: 5.3 % (ref 4.2–6.3)
POTASSIUM SERPL-SCNC: 4.5 MMOL/L (ref 3.5–5.3)
PROT SERPL-MCNC: 7.3 G/DL (ref 6.4–8.2)
SODIUM SERPL-SCNC: 140 MMOL/L (ref 136–145)

## 2019-04-15 PROCEDURE — 83036 HEMOGLOBIN GLYCOSYLATED A1C: CPT

## 2019-04-15 PROCEDURE — 80053 COMPREHEN METABOLIC PANEL: CPT

## 2019-04-15 PROCEDURE — 36415 COLL VENOUS BLD VENIPUNCTURE: CPT

## 2019-04-17 ENCOUNTER — OFFICE VISIT (OUTPATIENT)
Dept: PSYCHIATRY | Facility: CLINIC | Age: 64
End: 2019-04-17
Payer: COMMERCIAL

## 2019-04-17 DIAGNOSIS — F31.81 BIPOLAR 2 DISORDER (HCC): ICD-10-CM

## 2019-04-17 DIAGNOSIS — F31.62 BIPOLAR DISORDER, CURRENT EPISODE MIXED, MODERATE (HCC): Primary | ICD-10-CM

## 2019-04-17 DIAGNOSIS — G47.34 NOCTURNAL HYPOXEMIA: ICD-10-CM

## 2019-04-17 DIAGNOSIS — G47.33 OSA (OBSTRUCTIVE SLEEP APNEA): Primary | ICD-10-CM

## 2019-04-17 PROCEDURE — 99214 OFFICE O/P EST MOD 30 MIN: CPT | Performed by: NURSE PRACTITIONER

## 2019-04-17 RX ORDER — CLONAZEPAM 0.5 MG/1
0.25 TABLET ORAL 3 TIMES DAILY
Qty: 45 TABLET | Refills: 2 | Status: SHIPPED | OUTPATIENT
Start: 2019-04-17 | End: 2019-04-30 | Stop reason: DRUGHIGH

## 2019-04-18 ENCOUNTER — TELEPHONE (OUTPATIENT)
Dept: SLEEP CENTER | Facility: CLINIC | Age: 64
End: 2019-04-18

## 2019-04-19 ENCOUNTER — TELEPHONE (OUTPATIENT)
Dept: NEUROLOGY | Facility: CLINIC | Age: 64
End: 2019-04-19

## 2019-04-24 ENCOUNTER — TELEPHONE (OUTPATIENT)
Dept: PSYCHIATRY | Facility: CLINIC | Age: 64
End: 2019-04-24

## 2019-04-24 DIAGNOSIS — F31.60 BIPOLAR 1 DISORDER, MIXED (HCC): ICD-10-CM

## 2019-04-24 RX ORDER — ARIPIPRAZOLE 2 MG/1
2 TABLET ORAL DAILY
Qty: 30 TABLET | Refills: 2 | Status: SHIPPED | OUTPATIENT
Start: 2019-04-24 | End: 2019-04-30 | Stop reason: SDUPTHER

## 2019-04-30 ENCOUNTER — OFFICE VISIT (OUTPATIENT)
Dept: PSYCHIATRY | Facility: CLINIC | Age: 64
End: 2019-04-30
Payer: COMMERCIAL

## 2019-04-30 DIAGNOSIS — F31.81 BIPOLAR 2 DISORDER (HCC): Primary | ICD-10-CM

## 2019-04-30 DIAGNOSIS — F31.60 BIPOLAR 1 DISORDER, MIXED (HCC): ICD-10-CM

## 2019-04-30 PROCEDURE — 99213 OFFICE O/P EST LOW 20 MIN: CPT | Performed by: NURSE PRACTITIONER

## 2019-04-30 RX ORDER — ARIPIPRAZOLE 2 MG/1
2 TABLET ORAL DAILY
Qty: 30 TABLET | Refills: 2 | Status: SHIPPED | OUTPATIENT
Start: 2019-04-30 | End: 2019-07-23 | Stop reason: ALTCHOICE

## 2019-04-30 RX ORDER — CLONAZEPAM 0.5 MG/1
TABLET ORAL
Qty: 45 TABLET | Refills: 2 | Status: SHIPPED | OUTPATIENT
Start: 2019-04-30 | End: 2019-07-23 | Stop reason: ALTCHOICE

## 2019-06-10 ENCOUNTER — TELEPHONE (OUTPATIENT)
Dept: PSYCHIATRY | Facility: CLINIC | Age: 64
End: 2019-06-10

## 2019-06-21 ENCOUNTER — TELEPHONE (OUTPATIENT)
Dept: SLEEP CENTER | Facility: CLINIC | Age: 64
End: 2019-06-21

## 2019-06-25 ENCOUNTER — TELEPHONE (OUTPATIENT)
Dept: SLEEP CENTER | Facility: CLINIC | Age: 64
End: 2019-06-25

## 2019-06-25 DIAGNOSIS — G47.33 OSA (OBSTRUCTIVE SLEEP APNEA): Primary | ICD-10-CM

## 2019-06-25 DIAGNOSIS — G47.34 NOCTURNAL HYPOXEMIA: ICD-10-CM

## 2019-07-22 ENCOUNTER — TELEPHONE (OUTPATIENT)
Dept: PSYCHIATRY | Facility: CLINIC | Age: 64
End: 2019-07-22

## 2019-07-22 NOTE — TELEPHONE ENCOUNTER
Pt called/left msg stating she called awhile back in regards to stopping medication  Pt states she never heard back, so she weaned herself off of all her medications  Pt has an appt coming up on 7/30, but she does not feel she needs to come to it, due to not being on any medications and doing well  Pt can be reached at [phone]

## 2019-07-23 NOTE — TELEPHONE ENCOUNTER
I gave Ted Landau a call and we discussed her stopping her medications  Right now, she is using alternative methods such as exercise and changing her diet and as result, is feeling better  She is denying any breakthrough symptoms of blu or hypomania  She sounds good on the phone, there is no pressured speech than a noted an overall, she reports he is feeling well  She has stopped all her psychotropic medications so we will welcome her to give us a call in the future if needed

## 2019-08-10 ENCOUNTER — TRANSCRIBE ORDERS (OUTPATIENT)
Dept: LAB | Facility: HOSPITAL | Age: 64
End: 2019-08-10

## 2019-08-10 ENCOUNTER — APPOINTMENT (OUTPATIENT)
Dept: LAB | Facility: HOSPITAL | Age: 64
End: 2019-08-10
Payer: COMMERCIAL

## 2019-08-10 DIAGNOSIS — R19.5 ABNORMAL FECES: Primary | ICD-10-CM

## 2019-08-10 DIAGNOSIS — R19.5 ABNORMAL FECES: ICD-10-CM

## 2019-08-10 DIAGNOSIS — E78.00 PURE HYPERCHOLESTEROLEMIA: ICD-10-CM

## 2019-08-10 LAB
ALBUMIN SERPL BCP-MCNC: 3.7 G/DL (ref 3.5–5)
ALP SERPL-CCNC: 67 U/L (ref 46–116)
ALT SERPL W P-5'-P-CCNC: 63 U/L (ref 12–78)
ANION GAP SERPL CALCULATED.3IONS-SCNC: 5 MMOL/L (ref 4–13)
AST SERPL W P-5'-P-CCNC: 36 U/L (ref 5–45)
BASOPHILS # BLD AUTO: 0.04 THOUSANDS/ΜL (ref 0–0.1)
BASOPHILS NFR BLD AUTO: 1 % (ref 0–1)
BILIRUB SERPL-MCNC: 0.83 MG/DL (ref 0.2–1)
BUN SERPL-MCNC: 16 MG/DL (ref 5–25)
CALCIUM SERPL-MCNC: 9.2 MG/DL (ref 8.3–10.1)
CHLORIDE SERPL-SCNC: 105 MMOL/L (ref 100–108)
CHOLEST SERPL-MCNC: 190 MG/DL (ref 50–200)
CO2 SERPL-SCNC: 29 MMOL/L (ref 21–32)
CREAT SERPL-MCNC: 0.77 MG/DL (ref 0.6–1.3)
EOSINOPHIL # BLD AUTO: 0.08 THOUSAND/ΜL (ref 0–0.61)
EOSINOPHIL NFR BLD AUTO: 2 % (ref 0–6)
ERYTHROCYTE [DISTWIDTH] IN BLOOD BY AUTOMATED COUNT: 12.4 % (ref 11.6–15.1)
GFR SERPL CREATININE-BSD FRML MDRD: 81 ML/MIN/1.73SQ M
GLUCOSE P FAST SERPL-MCNC: 102 MG/DL (ref 65–99)
HCT VFR BLD AUTO: 39.4 % (ref 34.8–46.1)
HDLC SERPL-MCNC: 51 MG/DL (ref 40–60)
HGB BLD-MCNC: 13.3 G/DL (ref 11.5–15.4)
IMM GRANULOCYTES # BLD AUTO: 0.02 THOUSAND/UL (ref 0–0.2)
IMM GRANULOCYTES NFR BLD AUTO: 0 % (ref 0–2)
LDLC SERPL CALC-MCNC: 118 MG/DL (ref 0–100)
LYMPHOCYTES # BLD AUTO: 1.56 THOUSANDS/ΜL (ref 0.6–4.47)
LYMPHOCYTES NFR BLD AUTO: 33 % (ref 14–44)
MAGNESIUM SERPL-MCNC: 2.1 MG/DL (ref 1.6–2.6)
MCH RBC QN AUTO: 33 PG (ref 26.8–34.3)
MCHC RBC AUTO-ENTMCNC: 33.8 G/DL (ref 31.4–37.4)
MCV RBC AUTO: 98 FL (ref 82–98)
MONOCYTES # BLD AUTO: 0.38 THOUSAND/ΜL (ref 0.17–1.22)
MONOCYTES NFR BLD AUTO: 8 % (ref 4–12)
NEUTROPHILS # BLD AUTO: 2.61 THOUSANDS/ΜL (ref 1.85–7.62)
NEUTS SEG NFR BLD AUTO: 56 % (ref 43–75)
NONHDLC SERPL-MCNC: 139 MG/DL
NRBC BLD AUTO-RTO: 0 /100 WBCS
PLATELET # BLD AUTO: 213 THOUSANDS/UL (ref 149–390)
PMV BLD AUTO: 9.8 FL (ref 8.9–12.7)
POTASSIUM SERPL-SCNC: 4.1 MMOL/L (ref 3.5–5.3)
PROT SERPL-MCNC: 7 G/DL (ref 6.4–8.2)
RBC # BLD AUTO: 4.03 MILLION/UL (ref 3.81–5.12)
SODIUM SERPL-SCNC: 139 MMOL/L (ref 136–145)
TRIGL SERPL-MCNC: 105 MG/DL
WBC # BLD AUTO: 4.69 THOUSAND/UL (ref 4.31–10.16)

## 2019-08-10 PROCEDURE — 36415 COLL VENOUS BLD VENIPUNCTURE: CPT

## 2019-08-10 PROCEDURE — 80061 LIPID PANEL: CPT

## 2019-08-10 PROCEDURE — 80053 COMPREHEN METABOLIC PANEL: CPT

## 2019-08-10 PROCEDURE — 85025 COMPLETE CBC W/AUTO DIFF WBC: CPT

## 2019-08-10 PROCEDURE — 83735 ASSAY OF MAGNESIUM: CPT

## 2019-11-08 ENCOUNTER — HOSPITAL ENCOUNTER (OUTPATIENT)
Dept: SLEEP CENTER | Facility: CLINIC | Age: 64
Discharge: HOME | End: 2019-11-08
Payer: COMMERCIAL

## 2019-11-08 DIAGNOSIS — G47.33 OSA (OBSTRUCTIVE SLEEP APNEA): ICD-10-CM

## 2019-11-08 DIAGNOSIS — G47.34 NOCTURNAL HYPOXEMIA: ICD-10-CM

## 2019-11-08 PROCEDURE — 95811 POLYSOM 6/>YRS CPAP 4/> PARM: CPT

## 2019-11-08 PROCEDURE — 95811 POLYSOM 6/>YRS CPAP 4/> PARM: CPT | Performed by: PSYCHIATRY & NEUROLOGY

## 2019-11-09 NOTE — PROGRESS NOTES
Sleep Study Documentation    Pre-Sleep Study       Sleep testing procedure explained to patient:YES    Patient napped prior to study:NO    Caffeine:Dayshift worker after 12PM   Caffeine use:NO    Alcohol:Dayshift workers after 5PM: Alcohol use:NO    Typical day for patient:NO       Study Documentation    Sleep Study Indications: CURT- diagnosed home study    Sleep Study: Treatment   Optimal PAP pressure: 9 cm H2O while lateral  Leak:Small  Snore:Eliminated  REM Obtained:yes  Supplemental O2: no    Minimum SaO2 90  Baseline SaO2 95 4  PAP mask tried (list all) Res Med p-10 small  PAP mask choice (final) Res Med p-10 small   PAP mask type:pillows  PAP pressure at which snoring was eliminated 5  Minimum SaO2 at final PAP pressure 94  Mode of Therapy:CPAP  ETCO2:No  CPAP changed to BiPAP:No    Mode of Therapy:CPAP    EKG abnormalities: no     EEG abnormalities: no    Sleep Study Recorded < 2 hours: N/A    Sleep Study Recorded > 2 hours but incomplete study: N/A    Sleep Study Recorded 6 hours but no sleep obtained: NO    Patient classification: employed       Post-Sleep Study    Medication used at bedtime or during sleep study:NO    Patient reports time it took to fall asleep:less than 20 minutes    Patient reports waking up during study:3 or more times  Patient reports returning to sleep in greater than 30 minutes  Patient reports sleeping 4 to 6 hours without dreaming  Patient reports sleep during study:worse than usual    Patient rated sleepiness: Somewhat sleepy or tired    PAP treatment:yes: Post PAP treatment patient reports feeling unchanged and would wear PAP mask at home

## 2019-11-12 ENCOUNTER — TELEPHONE (OUTPATIENT)
Dept: SLEEP CENTER | Facility: CLINIC | Age: 64
End: 2019-11-12

## 2019-11-19 ENCOUNTER — TELEPHONE (OUTPATIENT)
Dept: SLEEP CENTER | Facility: CLINIC | Age: 64
End: 2019-11-19

## 2019-11-19 DIAGNOSIS — G47.33 OSA (OBSTRUCTIVE SLEEP APNEA): Primary | ICD-10-CM

## 2019-11-19 NOTE — TELEPHONE ENCOUNTER
----- Message from Yanet Ngo MD sent at 11/19/2019  1:53 PM EST -----  CPAP titration read  APAP ordered  Follow up in 6-8 weeks

## 2019-11-19 NOTE — TELEPHONE ENCOUNTER
I spoke with patient, advised Dr Juana Bustos recommends APAP  Scheduled DME set up 12/6/19 at   [facility] in [facility]    Compliance follow up scheduled 1/30/20 with Dr Juana Bustos in [place]

## 2019-12-06 ENCOUNTER — TELEPHONE (OUTPATIENT)
Dept: SLEEP CENTER | Facility: CLINIC | Age: 64
End: 2019-12-06

## 2019-12-09 ENCOUNTER — TELEPHONE (OUTPATIENT)
Dept: BEHAVIORAL/MENTAL HEALTH CLINIC | Facility: CLINIC | Age: 64
End: 2019-12-09

## 2019-12-09 NOTE — TELEPHONE ENCOUNTER
JUST FYI :  Patient states she has been using C-PAP and the diagnostics are not being sent to [facility]   She is bringing it in to have it checked by Jacobo

## 2020-01-16 ENCOUNTER — TELEPHONE (OUTPATIENT)
Dept: NEUROLOGY | Facility: CLINIC | Age: 65
End: 2020-01-16

## 2020-01-16 ENCOUNTER — TELEPHONE (OUTPATIENT)
Dept: PSYCHIATRY | Facility: CLINIC | Age: 65
End: 2020-01-16

## 2020-01-16 NOTE — TELEPHONE ENCOUNTER
She wants you to call her because she is still having extremely disturbed sleep  She wants to know what can she do to help her because she wakes up every couple of hours

## 2020-01-16 NOTE — TELEPHONE ENCOUNTER
She just got set up for CPAP last month  She needs to be seen for a compliance visit and we can discuss it

## 2020-01-16 NOTE — TELEPHONE ENCOUNTER
Pt last seen one year ago    Yes it would be good to have her follow up   [place] is another option location for her

## 2020-01-21 ENCOUNTER — TELEPHONE (OUTPATIENT)
Dept: PSYCHIATRY | Facility: CLINIC | Age: 65
End: 2020-01-21

## 2020-01-21 NOTE — TELEPHONE ENCOUNTER
She has not been seen in a year by me in sleep clinic  She should be coming in to see me for a CPAP compliance visit and we will discuss things then

## 2020-01-21 NOTE — TELEPHONE ENCOUNTER
I'm having trouble sleeping and religiously using the CPap and and cleaning it on a regular basis  I have not been sleeping, I only get 3-4  hours of sleep a night

## 2020-01-30 ENCOUNTER — TELEPHONE (OUTPATIENT)
Dept: SLEEP CENTER | Facility: CLINIC | Age: 65
End: 2020-01-30

## 2020-01-30 ENCOUNTER — OFFICE VISIT (OUTPATIENT)
Dept: SLEEP CENTER | Facility: CLINIC | Age: 65
End: 2020-01-30
Payer: COMMERCIAL

## 2020-01-30 VITALS
DIASTOLIC BLOOD PRESSURE: 70 MMHG | SYSTOLIC BLOOD PRESSURE: 132 MMHG | WEIGHT: 164 LBS | HEIGHT: 60 IN | BODY MASS INDEX: 32.2 KG/M2

## 2020-01-30 DIAGNOSIS — G47.33 OSA (OBSTRUCTIVE SLEEP APNEA): Primary | ICD-10-CM

## 2020-01-30 PROCEDURE — 99214 OFFICE O/P EST MOD 30 MIN: CPT | Performed by: PSYCHIATRY & NEUROLOGY

## 2020-01-30 RX ORDER — TRIAMTERENE AND HYDROCHLOROTHIAZIDE 37.5; 25 MG/1; MG/1
CAPSULE ORAL
COMMUNITY
Start: 2019-04-03

## 2020-01-30 NOTE — TELEPHONE ENCOUNTER
Received a pressure change  Changed accordingly  Patient's now set to an APAP 7-15cm  Called patient and informed her

## 2020-01-30 NOTE — PROGRESS NOTES
Sleep Medicine Follow-Up Note    HPI: 71yo F with CURT being seen for a follow up  Treatment Summary: 2019 HST: MATT 33 cordell of 77%  CPAP titration 2019: APAP 9-15cm recommended  HPI:   Today, patient presents unaccompanied  The patient stated that she has lost 30 lbs since 2019  She has come off of Cymbalta  The patient stated that she is using her CPAP consistently every night  She trouble staying asleep  She wakes up hourly  She believes part of her waking up is pain from her torn rotator cuff, she is also waking up because her mouth is dry  She uses the humidity, but she still wakes up with a dry mouth and has to drink water         Treatment: APAP  -Pressure: 9-15cm   -Pressure intolerance: no   -Aerophagia: no   -Air Hunger: no  -Interface: FFM  -Fit: good  -Chin strap: no  -HCC: YME  -Patient's perceived outcome: feels that is helps her breath better    Compliance Card Data:    - Date Range: 19-20   - Settin-15cm   - Pressure: Median 9 5cm; 90th%ile = 10 7cm   - Residual AHI: 0 9   - %  Night in Large Leak: 32 sec   - Average usage days used: 6h 50m   - % Days used: 100%   - % Days with Usage > 4 hrs: 100%    Respiratory:  -Ongoing Snoring: unsure  -Mouth Breathing: sometimes  -Dry Mouth: yes  -Nocturnal Gasping: no    ROS:   Genitourinary post menopausal (no peroids)   Cardiology ankle/leg swelling   Gastrointestinal frequent heartburn/acid reflux   Neurology need to move extremities, numbness/tingling of an extremity, forgetfulness, poor concentration or confusion,  and balance problems   Constitutional fatigue and weight change   Integumentary none   Psychiatry none   Musculoskeletal joint pain and legs twitching/jerking   Pulmonary shortness of breath with activity, snoring and difficulty breathing when lying flat    ENT throat clearing   Endocrine excessive thirst   Hematological none       Sleep Pattern:  -Position: side and back  -Bedtime: 10-11pm  -Lights out: same time  -Environmental: No lights/TV  -Latency: 10-15min  -Awakenings: 3              -Reason: dry mouth, change position, get water  -Wake time: 530am  -Shift Work: 8-5am M-F  -Patient's estimate of total sleep time: varies    Daytime Symptoms:  -Upon Awakening: like she has not slept, not energized  -Daytime fatigue/sleepiness: tired and yawning  -Naps: no  -Involuntary Dozing: no  -Cognitive Symptoms: focus is an issue, making mistakes at work  -Driving: Difficulty with sleepiness and driving:  denied   -- Close calls related to sleepiness: denied   -- Accidents related to sleepiness: denied    Substance Use:  -Caffeine: half caf in the morning  -Alcohol: rarely  -THC: denied    --> Supplemental Oxygen Use: denies    Questionnaire:  Sitting and reading: Slight chance of dozing  Watching TV: Slight chance of dozing  Sitting, inactive in a public place (e g  a theatre or a meeting): Would never doze  As a passenger in a car for an hour without a break: Would never doze  Lying down to rest in the afternoon when circumstances permit: Moderate chance of dozing  Sitting and talking to someone: Would never doze  Sitting quietly after a lunch without alcohol: Would never doze  In a car, while stopped for a few minutes in traffic: Would never doze  Total score: 4      PE:    /70   Ht 5' (1 524 m)   Wt 74 4 kg (164 lb)   BMI 32 03 kg/m²     General:  In NAD  Pul: Respirations: unlaboured  MS: No atrophy  Neuro: No resting tremor  Gait normal turning & station; unremarkable overall  Psych: Socially appropriate  Pleasant  No overt dysphoria  Assessment: The patient has been compliant with her APAP and her obstructive events are well controlled with a residual AHI 0 9  She is derivign from using her CPAP as she feels she is breathing better  She is, however, having issues with pain at this time due to her shoulder, waking her up  She also lost 30 lbs  Will decrease the pressure range to 7-15cm  Advised her to turn up the humidification due to the dry mouth  Recommendations:    1) APAP at 7-15cm and turn ramp off  Increase humidity  Nasal sprays  2) Safe driving reviewed  3) Follow-up 3 months  4) Call with any questions or concerns  All questions answered for the patient, who indicated understanding and agreed with the plan       Jose Aranda MD  Psychiatry/ Sleep medicine

## 2020-01-30 NOTE — PATIENT INSTRUCTIONS
Recommendations:    1) APAP at 7-15cm and turn ramp off  Increase humidity  Nasal sprays  2) Safe driving reviewed  3) Follow-up 3 months  4) Call with any questions or concerns

## 2020-02-03 ENCOUNTER — APPOINTMENT (OUTPATIENT)
Dept: RADIOLOGY | Facility: MEDICAL CENTER | Age: 65
End: 2020-02-03
Payer: COMMERCIAL

## 2020-02-03 ENCOUNTER — OFFICE VISIT (OUTPATIENT)
Dept: OBGYN CLINIC | Facility: MEDICAL CENTER | Age: 65
End: 2020-02-03
Payer: COMMERCIAL

## 2020-02-03 VITALS
DIASTOLIC BLOOD PRESSURE: 85 MMHG | WEIGHT: 165 LBS | BODY MASS INDEX: 32.39 KG/M2 | HEART RATE: 56 BPM | HEIGHT: 60 IN | SYSTOLIC BLOOD PRESSURE: 136 MMHG

## 2020-02-03 DIAGNOSIS — M25.512 LEFT SHOULDER PAIN, UNSPECIFIED CHRONICITY: ICD-10-CM

## 2020-02-03 DIAGNOSIS — M19.012 PRIMARY OSTEOARTHRITIS OF LEFT SHOULDER: Primary | ICD-10-CM

## 2020-02-03 PROCEDURE — 73030 X-RAY EXAM OF SHOULDER: CPT

## 2020-02-03 PROCEDURE — 99203 OFFICE O/P NEW LOW 30 MIN: CPT | Performed by: ORTHOPAEDIC SURGERY
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
## 2020-02-10 ENCOUNTER — TELEPHONE (OUTPATIENT)
Dept: OBGYN CLINIC | Facility: HOSPITAL | Age: 65
End: 2020-02-10

## 2020-02-10 ENCOUNTER — EVALUATION (OUTPATIENT)
Dept: PHYSICAL THERAPY | Facility: CLINIC | Age: 65
End: 2020-02-10

## 2020-02-10 DIAGNOSIS — M19.012 PRIMARY OSTEOARTHRITIS OF LEFT SHOULDER: ICD-10-CM

## 2020-02-10 PROCEDURE — 97162 PT EVAL MOD COMPLEX 30 MIN: CPT | Performed by: PHYSICAL THERAPIST

## 2020-02-10 NOTE — PROGRESS NOTES
PT Evaluation     Today's date: 2/10/2020  Patient name: Leonel Mistry  : 1955  MRN: [id number]  Referring provider: Maryjane Reed DO  Dx:   Encounter Diagnosis     ICD-10-CM    1  Primary osteoarthritis of left shoulder M19 012 Ambulatory referral to Physical Therapy                  Assessment  Assessment details: Patient is a 75 y o  female presenting to outpatient physical therapy with chief complaints of (L) shoulder  No further referral appears necessary at this time based upon examination results  Patient describes episodic (L) shoulder pain for many years with recent insidious increase in shoulder pain about 3 months ago  Patient displays with abnormal posture, restricted shoulder ROM, parascapular weakness, (+) impingement testing, (+) RC testing   Primary movement diagnosis of poor scapular motor control resulting in pathoanatomical signs and symptoms consistent with (L) shoulder OA resulting in limitations in her ability to dress without pain and exercise as desired        Please contact me if you have any questions  Thank you for the opportunity to share in the care of this patient       Impairments: abnormal coordination, abnormal muscle tone, abnormal or restricted ROM, abnormal movement, activity intolerance, impaired physical strength, lacks appropriate home exercise program, pain with function, scapular dyskinesis, poor posture  and poor body mechanics    Symptom irritability: moderateUnderstanding of Dx/Px/POC: good   Prognosis: fair  Prognosis details: Positive prognostic indicators include: positive attitude toward recovery, motivated to improve  Negative prognostic indicators include fear avoidance beliefs, chronicity of pain  Goals  STG to be met in 2 weeks:  - Decrease pain to 3/10 max  - Increase (L) Shoulder AROM: Functional IR to L4   - Increase (L) UE strength by 1/2 MMT grade  - I with HEP   - Patient will be able to dress without pain     LTG to be met in 4 weeks:  - Decrease pain to 1/10 max  - Increase (L) Shoulder AROM: Functional IR to L2   - Increase (L) UE strength to 4+/5 all planes  - I with updated HEP   - Patient will be able to return to regular workouts without increased pain  Plan  Plan details: Prognosis above is given PT services 2x/week tapering to 1x/week over the next 4 weeks and home program adherence  Patient would benefit from: skilled physical therapy  Planned modality interventions: cryotherapy and thermotherapy: hydrocollator packs  Planned therapy interventions: joint mobilization, activity modification, manual therapy, motor coordination training, neuromuscular re-education, body mechanics training, patient education, postural training, strengthening, stretching, therapeutic activities, therapeutic exercise, functional ROM exercises and home exercise program  Plan of Care beginning date: 2/10/2020  Plan of Care expiration date: 3/9/2020  Treatment plan discussed with: patient and PTA        Subjective Evaluation    History of Present Illness  Mechanism of injury: Patient reports episodic (L) shoulder pain over the past 10 years  She reports receiving chiropractic treatment over the years which has helped  She reports in the last 3 months she has experienced insidious increase in (L) shoulder pain  She notes increased difficulty with behind the back motions  She saw Dr Jaxson Zhang - x-rays were performed and PT was recommended       Red Flag Screen:  Patient denies recent fever, headache, dizziness, nausea, vomiting, weakness, tingling, numbness, or traumatic DEYA       Greatest concern: wants to be functional and self-sufficient   Quality of life: good    Pain  Current pain rating: 3  At best pain ratin  At worst pain ratin  Location: (L) Shoulder   Quality: dull ache and burning    Social Support  Lives with: alone    Employment status: working (Desk work )  Hand dominance: right      Diagnostic Tests  X-ray: abnormal  Treatments  Previous treatment: chiropractic  Patient Goals  Patient goal: Dress without pain, return to exercise without restrictions         Objective     Static Posture     Head  Forward  Shoulders  Rounded  Thoracic Spine  Hyperkyphosis  Postural Observations  Seated posture: fair  Standing posture: fair        Palpation     Additional Palpation Details  TTP (L) anterior shoulder - biceps region and supraspinatus region     Active Range of Motion   Left Shoulder   Flexion: WFL  Extension: 50 degrees   Abduction: 165 degrees   External rotation BTH: T1   Internal rotation BTB: sacrum     Right Shoulder   Flexion: WFL  Extension: WFL  Abduction: 170 degrees   External rotation BTH: T2   Internal rotation BTB: T11     Passive Range of Motion   Left Shoulder   Flexion: 170 degrees with pain  Abduction: 170 degrees with pain  External rotation 90°: 90 degrees   Internal rotation 90°: 60 degrees with pain    Joint Play   Left Shoulder  Joints within functional limits are the anterior capsule, posterior capsule and inferior capsule  Strength/Myotome Testing     Left Shoulder     Planes of Motion   Flexion: 4+   Extension: 5   Abduction: 4   External rotation at 0°: 4+   Internal rotation at 0°: 5     Isolated Muscles   Biceps: 5   Lower trapezius: 3+   Rhomboids: 3+   Triceps: 5     Right Shoulder     Planes of Motion   Flexion: 4+   Extension: 5   Abduction: 4+   External rotation at 0°: 4+   Internal rotation at 0°: 5     Isolated Muscles   Biceps: 5   Lower trapezius: 4+   Rhomboids: 4+   Triceps: 5     Tests     Left Shoulder   Positive active compression ([place]), empty can, full can, Hawkin's and horn blower  Negative drop arm, external rotation lag sign, Neer's, painful arc and bicep load           Daily Treatment Diary     DX: (L) Shoulder OA  EPOC: 3/9/20  Precautions: standard  CO-MORBIDITES: MS, HTN    Stage: Chronic  Primary Movement Diagnosis: poor motor control  Symptom Irritability Level: moderate  Stability of Symptoms: stable - improving  Main Functional Goal(s): return to exercise  Current Activity Recommendations: limit hyperextension with exercise, added HEP (2/10)  Greatest Concern:   Educational Needs: understanding pain, nocebic language     Manual           (L) Shoulder Mobs          (L) Shoulder PROM                                            Exercise Diary  HEP         UBE                    Prone Sh' Row 2/10         Prone Sh' Ext 2/10         Prone Sh' VB          Prone Sh' ABD 2/10                   TB Sh' Row          TB Sh' Ext          TB Triceps          TB (B) ER          TB Uni H'ABD                    Std Sh' Flex          Std Sh' Scaption          Std Sh' ABD                                                                Modalities

## 2020-02-10 NOTE — TELEPHONE ENCOUNTER
Patient sees Dr Warren Hernandez  Patient is calling in wanting to get a release form for her records to get a copy of her xrays  Patient is going to stop in the office to fill this

## 2020-02-18 ENCOUNTER — OFFICE VISIT (OUTPATIENT)
Dept: PHYSICAL THERAPY | Facility: CLINIC | Age: 65
End: 2020-02-18

## 2020-02-18 DIAGNOSIS — M19.012 PRIMARY OSTEOARTHRITIS OF LEFT SHOULDER: Primary | ICD-10-CM

## 2020-02-18 PROCEDURE — 97110 THERAPEUTIC EXERCISES: CPT | Performed by: PHYSICAL THERAPIST

## 2020-02-18 PROCEDURE — 97140 MANUAL THERAPY 1/> REGIONS: CPT | Performed by: PHYSICAL THERAPIST

## 2020-02-18 PROCEDURE — 97112 NEUROMUSCULAR REEDUCATION: CPT | Performed by: PHYSICAL THERAPIST

## 2020-02-18 NOTE — PROGRESS NOTES
Daily Note     Today's date: 2020  Patient name: Aj Forte  : 1955  MRN: [id number]  Referring provider: Ayaka Rice DO  Dx:   Encounter Diagnosis     ICD-10-CM    1  Primary osteoarthritis of left shoulder M19 012                   Subjective: Patient reports she has been very busy with exercising and very active since IE  She notes some increased pain with HEP - particularly with prone shoulder exercises  Objective: See treatment diary below      Assessment:   Stage: Chronic  Primary Movement Diagnosis: poor motor control  Symptom Irritability Level: moderate  Stability of Symptoms: stable - improving  Main Functional Goal(s): return to exercise  Current Activity Recommendations: limit hyperextension with exercise, added HEP (2/10); modified prone sh' abduction - palm down ()  Greatest Concern:   Educational Needs: understanding pain, nocebic language     Patient tolerated manual treatment well - reported less discomfort post treatment  HEP exercises were reviewed - modified prone shoulder abduction  Patient had good overall tolerance to other exercises - no complaints post treatment         Plan: Continue with POC - monitor tolerance to initial treatment        Daily Treatment Diary     DX: (L) Shoulder OA  EPOC: 3/9/20  Precautions: standard  CO-MORBIDITES: MS, HTN      Manual           (L) Shoulder Mobs Post/ Inf  Gr 1/2  3 min         (L) Shoulder PROM 5 min                                            Exercise Diary  HEP         UBE  2'/2'                  Prone Sh' Row /10 20x        Prone Sh' Ext 2/10 20x        Prone Sh' VB  20x        Prone Sh' ABD 2/10 10x                   TB Sh' Row  OTB  20x        TB Sh' Ext  OTB  20x        TB Triceps  OTB  20x        TB (B) ER          TB Uni H'ABD                    Std Sh' Flex  1# 20x        Std Sh' Scaption  1# 20x        Std Sh' ABD  1# 20x                                                              Modalities

## 2020-02-20 ENCOUNTER — OFFICE VISIT (OUTPATIENT)
Dept: PHYSICAL THERAPY | Facility: CLINIC | Age: 65
End: 2020-02-20

## 2020-02-20 DIAGNOSIS — M19.012 PRIMARY OSTEOARTHRITIS OF LEFT SHOULDER: Primary | ICD-10-CM

## 2020-02-20 PROCEDURE — 97110 THERAPEUTIC EXERCISES: CPT | Performed by: PHYSICAL THERAPIST

## 2020-02-20 PROCEDURE — 97140 MANUAL THERAPY 1/> REGIONS: CPT | Performed by: PHYSICAL THERAPIST

## 2020-02-20 PROCEDURE — 97112 NEUROMUSCULAR REEDUCATION: CPT | Performed by: PHYSICAL THERAPIST

## 2020-02-20 NOTE — PROGRESS NOTES
Daily Note     Today's date: 2020  Patient name: Leonel Mistry  : 1955  MRN: [id number]  Referring provider: Maryjane Reed DO  Dx:   Encounter Diagnosis     ICD-10-CM    1  Primary osteoarthritis of left shoulder M19 012                   Subjective: Patient reports good tolerance to her LV  She reports she has not performed her HEP since her LV  She reports seeing her chiropractor and is feeling much better  Objective: See treatment diary below      Assessment:   Stage: Chronic  Primary Movement Diagnosis: poor motor control  Symptom Irritability Level: moderate  Stability of Symptoms: stable - improving  Main Functional Goal(s): return to exercise  Current Activity Recommendations: limit hyperextension with exercise, added HEP (2/10); modified prone sh' abduction - palm down ()  Greatest Concern:   Educational Needs: understanding pain, nocebic language     Patient demonstrated good mobility following manual treatment  She continues to challenged with exercises  Updated program with wall pushups and TB IR  Patient reported good overall tolerance to her treatment - no complaints post treatment       Plan: Continue with POC - monitor tolerance to progressed treatment       Daily Treatment Diary     DX: (L) Shoulder OA  EPOC: 3/9/20  Precautions: standard  CO-MORBIDITES: MS, HTN      Manual          (L) Shoulder Mobs Post/ Inf  Gr 1/2  3 min Post/ Inf  Gr 1/2  3 min        (L) Shoulder PROM 5 min  5 min                                          Exercise Diary  HEP        UBE  2'/2' 2'/2'                 Prone Sh' Row 2/10 20x 1# 20x       Prone Sh' Ext /10 20x 1# 20x       Prone Sh' VB  20x 1# 20x       Prone Sh' ABD /10 10x  1# 20x                 Wall Pushups   15x       TB Sh' Row  OTB  20x OTB  20x       TB Sh' Ext  OTB  20x OTB  20x       TB Triceps  OTB  20x OTB  20x       TB (B) ER          TB Uni H'ABD          TB IR   OTB  20 ea       DB [facility] Std Sh' Flex  1# 20x 1# 20x       Std Sh' Scaption  1# 20x 1# 20x       Std Sh' ABD  1# 20x 1# 20x                                                             Modalities

## 2020-02-25 ENCOUNTER — OFFICE VISIT (OUTPATIENT)
Dept: PHYSICAL THERAPY | Facility: CLINIC | Age: 65
End: 2020-02-25

## 2020-02-25 DIAGNOSIS — M19.012 PRIMARY OSTEOARTHRITIS OF LEFT SHOULDER: Primary | ICD-10-CM

## 2020-02-25 PROCEDURE — 97140 MANUAL THERAPY 1/> REGIONS: CPT | Performed by: PHYSICAL THERAPIST

## 2020-02-25 PROCEDURE — 97112 NEUROMUSCULAR REEDUCATION: CPT | Performed by: PHYSICAL THERAPIST

## 2020-02-25 NOTE — PROGRESS NOTES
Daily Note     Today's date: 2020  Patient name: Lori Downey  : 1955  MRN: [id number]  Referring provider: Alfredo Mena DO  Dx:   Encounter Diagnosis     ICD-10-CM    1  Primary osteoarthritis of left shoulder M19 012                   Subjective: Patient reports good tolerance to her LV  She reports her shoulder has been feeling much better  She has no questions with her HEP  Objective: See treatment diary below      Assessment:   Stage: Chronic  Primary Movement Diagnosis: poor motor control  Symptom Irritability Level: moderate  Stability of Symptoms: stable - improving  Main Functional Goal(s): return to exercise  Current Activity Recommendations: limit hyperextension with exercise, added HEP (2/10); modified prone sh' abduction - palm down ()  Greatest Concern:   Educational Needs: understanding pain, nocebic language     Patient continues to respond well to manual treatment - improved motion and improved pain levels  She demonstrated improved form with exercises - minimal cuing required throughout her treatment  Patient had no complaints of increased pain post treatment       Plan: Continue with POC - monitor tolerance to progressed treatment       Daily Treatment Diary     DX: (L) Shoulder OA  EPOC: 3/9/20  Precautions: standard  CO-MORBIDITES: MS, HTN      Manual         (L) Shoulder Mobs Post/ Inf  Gr 1/2  3 min Post/ Inf  Gr 1/2  3 min Post/ Inf  Gr 2/3  3 min       (L) Shoulder PROM 5 min  5 min 5 min                                         Exercise Diary  HEP       UBE  2'/2' 2'/2' 2'/2'                Prone Sh' Row 2/10 20x 1# 20x 2# 20x      Prone Sh' Ext /10 20x 1# 20x 2# 20x      Prone Sh' VB  20x 1# 20x 2# 20x      Prone Sh' ABD 2/10 10x  1# 20x 2# 20x      Prone Sh' VF    1# 10x      Wall Pushups   15x 20x      TB Sh' Row  OTB  20x OTB  20x GTB  20x      TB Sh' Ext  OTB  20x OTB  20x GTB  20x      TB Triceps  OTB  20x OTB  20x GTB  20x TB (B) ER          TB Uni H'ABD          TB IR   OTB  20 ea GTB  20x      DB Chest Fly                    Std Sh' Flex  1# 20x 1# 20x 1# 20x      Std Sh' Scaption  1# 20x 1# 20x 1# 20x      Std Sh' ABD  1# 20x 1# 20x 1# 20x                                                            Modalities

## 2020-02-27 ENCOUNTER — OFFICE VISIT (OUTPATIENT)
Dept: PHYSICAL THERAPY | Facility: CLINIC | Age: 65
End: 2020-02-27

## 2020-02-27 DIAGNOSIS — M19.012 PRIMARY OSTEOARTHRITIS OF LEFT SHOULDER: Primary | ICD-10-CM

## 2020-02-27 PROCEDURE — 97112 NEUROMUSCULAR REEDUCATION: CPT | Performed by: PHYSICAL THERAPIST

## 2020-02-27 PROCEDURE — 97110 THERAPEUTIC EXERCISES: CPT | Performed by: PHYSICAL THERAPIST

## 2020-02-27 PROCEDURE — 97140 MANUAL THERAPY 1/> REGIONS: CPT | Performed by: PHYSICAL THERAPIST

## 2020-02-27 NOTE — PROGRESS NOTES
Daily Note     Today's date: 2020  Patient name: Kyleigh Mccrary  : 1955  MRN: [id number]  Referring provider: Jeffery Yeh DO  Dx:   Encounter Diagnosis     ICD-10-CM    1  Primary osteoarthritis of left shoulder M19 012                   Subjective: Patient reports good tolerance to her LV  She reports her shoulder has been feeling much better  She has no questions with her HEP  Objective: See treatment diary below      Assessment:   Stage: Chronic  Primary Movement Diagnosis: poor motor control  Symptom Irritability Level: moderate  Stability of Symptoms: stable - improving  Main Functional Goal(s): return to exercise  Current Activity Recommendations: limit hyperextension with exercise, added HEP (2/10); modified prone sh' abduction - palm down ()  Greatest Concern:   Educational Needs: understanding pain, nocebic language     Patient continues to respond well to manual treatment - decreased pain  HEP was updated and reviewed today with good understanding  She had no complaints post treatment       Plan: DC to I HEP per patient request       Daily Treatment Diary     DX: (L) Shoulder OA  EPOC: 3/9/20  Precautions: standard  CO-MORBIDITES: MS, HTN      Manual        (L) Shoulder Mobs Post/ Inf  Gr 1/2  3 min Post/ Inf  Gr 1/2  3 min Post/ Inf  Gr 2/3  3 min Post/ Inf  Gr 2/3  3 min      (L) Shoulder PROM 5 min  5 min 5 min 5 min                                        Exercise Diary  HEP      UBE  2'/2' 2'/2' 2'/2' 3'/3'               Prone Sh' Row 2/10 20x 1# 20x 2# 20x 2# 20x     Prone Sh' Ext 2/10 20x 1# 20x 2# 20x 2# 20x     Prone Sh' VB  20x 1# 20x 2# 20x 2# 20x     Prone Sh' ABD 2/10 10x  1# 20x 2# 20x 2# 20x     Prone Sh' VF    1# 10x 1# 10x     Wall Pushups   15x 20x      TB Sh' Row  OTB  20x OTB  20x GTB  20x GTB  20x     TB Sh' Ext  OTB  20x OTB  20x GTB  20x GTB  20x     TB Triceps  OTB  20x OTB  20x GTB  20x GTB  20x     TB (B) ER          TB Uni H'ABD          TB IR 2/27  OTB  20 ea GTB  20x GTB  20x     DB Chest Fly                    Std Sh' Flex 2/27 1# 20x 1# 20x 1# 20x 1# 20x     Std Sh' Scaption 2/27 1# 20x 1# 20x 1# 20x 1# 20x     Std Sh' ABD 2/27 1# 20x 1# 20x 1# 20x 1# 20x                                                           Modalities

## 2020-03-09 ENCOUNTER — TELEPHONE (OUTPATIENT)
Dept: SLEEP CENTER | Facility: CLINIC | Age: 65
End: 2020-03-09

## 2020-03-09 ENCOUNTER — APPOINTMENT (OUTPATIENT)
Dept: LAB | Facility: HOSPITAL | Age: 65
End: 2020-03-09
Payer: COMMERCIAL

## 2020-03-09 ENCOUNTER — TRANSCRIBE ORDERS (OUTPATIENT)
Dept: ADMINISTRATIVE | Facility: HOSPITAL | Age: 65
End: 2020-03-09

## 2020-03-09 DIAGNOSIS — M25.50 PAIN IN JOINT, MULTIPLE SITES: ICD-10-CM

## 2020-03-09 DIAGNOSIS — E78.00 PURE HYPERCHOLESTEROLEMIA: ICD-10-CM

## 2020-03-09 DIAGNOSIS — R73.01 IMPAIRED FASTING GLUCOSE: ICD-10-CM

## 2020-03-09 DIAGNOSIS — G47.33 OSA (OBSTRUCTIVE SLEEP APNEA): Primary | ICD-10-CM

## 2020-03-09 DIAGNOSIS — E78.00 PURE HYPERCHOLESTEROLEMIA: Primary | ICD-10-CM

## 2020-03-09 LAB
ANION GAP SERPL CALCULATED.3IONS-SCNC: 7 MMOL/L (ref 4–13)
BUN SERPL-MCNC: 19 MG/DL (ref 5–25)
CALCIUM SERPL-MCNC: 9.5 MG/DL (ref 8.3–10.1)
CHLORIDE SERPL-SCNC: 106 MMOL/L (ref 100–108)
CHOLEST SERPL-MCNC: 181 MG/DL (ref 50–200)
CO2 SERPL-SCNC: 29 MMOL/L (ref 21–32)
CREAT SERPL-MCNC: 0.84 MG/DL (ref 0.6–1.3)
CRP SERPL QL: <3 MG/L
ERYTHROCYTE [SEDIMENTATION RATE] IN BLOOD: 7 MM/HOUR (ref 0–20)
EST. AVERAGE GLUCOSE BLD GHB EST-MCNC: 103 MG/DL
GFR SERPL CREATININE-BSD FRML MDRD: 73 ML/MIN/1.73SQ M
GLUCOSE P FAST SERPL-MCNC: 97 MG/DL (ref 65–99)
HBA1C MFR BLD: 5.2 %
HDLC SERPL-MCNC: 56 MG/DL
LDLC SERPL CALC-MCNC: 108 MG/DL (ref 0–100)
NONHDLC SERPL-MCNC: 125 MG/DL
POTASSIUM SERPL-SCNC: 3.9 MMOL/L (ref 3.5–5.3)
RHEUMATOID FACT SER QL LA: NEGATIVE
SODIUM SERPL-SCNC: 142 MMOL/L (ref 136–145)
TRIGL SERPL-MCNC: 83 MG/DL
URATE SERPL-MCNC: 4.3 MG/DL (ref 2–6.8)

## 2020-03-09 PROCEDURE — 86200 CCP ANTIBODY: CPT

## 2020-03-09 PROCEDURE — 36415 COLL VENOUS BLD VENIPUNCTURE: CPT

## 2020-03-09 PROCEDURE — 86140 C-REACTIVE PROTEIN: CPT

## 2020-03-09 PROCEDURE — 85652 RBC SED RATE AUTOMATED: CPT

## 2020-03-09 PROCEDURE — 80061 LIPID PANEL: CPT

## 2020-03-09 PROCEDURE — 80048 BASIC METABOLIC PNL TOTAL CA: CPT

## 2020-03-09 PROCEDURE — 86430 RHEUMATOID FACTOR TEST QUAL: CPT

## 2020-03-09 PROCEDURE — 83036 HEMOGLOBIN GLYCOSYLATED A1C: CPT

## 2020-03-09 PROCEDURE — 84550 ASSAY OF BLOOD/URIC ACID: CPT

## 2020-03-09 NOTE — TELEPHONE ENCOUNTER
Patient left message that she has an appointment on 3/11 @ 2 pm in [facility] to return her CPAP  She states that she tried to use it for 3 months but can't tolerate it  She didn't use it this weekend and slept much better  She states she is going to cancel her follow-up appointment with sleep medicine on 4/30  She will follow-up with the neurologist, but just feels like "   I need to do this my way   ", stating that she lost weight & is exercising regularly

## 2020-03-11 LAB — CCP IGA+IGG SERPL IA-ACNC: 8 UNITS (ref 0–19)

## 2020-03-12 ENCOUNTER — OFFICE VISIT (OUTPATIENT)
Dept: NEUROLOGY | Facility: CLINIC | Age: 65
End: 2020-03-12
Payer: COMMERCIAL

## 2020-03-12 VITALS
HEART RATE: 60 BPM | HEIGHT: 60 IN | WEIGHT: 160 LBS | SYSTOLIC BLOOD PRESSURE: 130 MMHG | BODY MASS INDEX: 31.41 KG/M2 | DIASTOLIC BLOOD PRESSURE: 72 MMHG

## 2020-03-12 DIAGNOSIS — G47.33 OSA (OBSTRUCTIVE SLEEP APNEA): ICD-10-CM

## 2020-03-12 DIAGNOSIS — G35 MULTIPLE SCLEROSIS (HCC): Primary | ICD-10-CM

## 2020-03-12 PROCEDURE — 99214 OFFICE O/P EST MOD 30 MIN: CPT | Performed by: PSYCHIATRY & NEUROLOGY

## 2020-03-12 NOTE — PATIENT INSTRUCTIONS
Multiple sclerosis ([facility] [address] )  Pt here for neuro follow up  Overall doing well neurologically over the past yr  No recent hospitalizations  No recent infections  Pt is exercising daily and lost 33 lbs over the past 6 months  Pt notes overall feeling much better since off SSRIs  Pt did follow up with psychiatry initially after last appt  Pt notes now sleeping better  Pt last seen by sleep med on 1/30/20  Pt has self discontinued her cpap and notified dr Pia Bates  Pt notes no new ms like sxs  Pt exam is stable   Pt to follow up annually for her neuro exam eval and clinical surveillance

## 2020-03-12 NOTE — ASSESSMENT & PLAN NOTE
Pt following with sleep med  Pt has notified her sleep doc that she is now stopped cpap  Sleep doc discontinued her cpap  Pt working on weight reduction of 33 lbs

## 2020-03-12 NOTE — ASSESSMENT & PLAN NOTE
Pt here for neuro follow up  Overall doing well neurologically over the past yr  No recent hospitalizations  No recent infections  Exam stable    Pt remains off imd meds  Pt exam is stable  No new or focal findings on exam  Pt is exercising daily and lost 33 lbs over the past 6 months  Pt notes overall feeling much better since off SSRIs  Pt did follow up with psychiatry initially after last appt  Pt notes now sleeping better  Pt last seen by sleep med on 1/30/20  Pt has self discontinued her cpap and notified dr Garcia  Pt notes no new ms like sxs  Pt exam is stable   Pt to follow up annually for her neuro exam eval and clinical surveillance

## 2020-03-12 NOTE — PROGRESS NOTES
Patient ID: Patel Linares is a 75 y o  female  Assessment/Plan:    Multiple sclerosis ([address] )  Pt here for neuro follow up  Overall doing well neurologically over the past yr  No recent hospitalizations  No recent infections  Exam stable  Pt remains off imd meds  Pt exam is stable  No new or focal findings on exam  Pt is exercising daily and lost 33 lbs over the past 6 months  Pt notes overall feeling much better since off SSRIs  Pt did follow up with psychiatry initially after last appt  Pt notes now sleeping better  Pt last seen by sleep med on 1/30/20  Pt has self discontinued her cpap and notified dr Kip Orellana  Pt notes no new ms like sxs  Pt exam is stable   Pt to follow up annually for her neuro exam eval and clinical surveillance    CURT (obstructive sleep apnea)  Pt following with sleep med  Pt has notified her sleep doc that she is now stopped cpap  Sleep doc discontinued her cpap  Pt working on weight reduction of 33 lbs       Diagnoses and all orders for this visit:    Multiple sclerosis ([facility] [address] )    CURT (obstructive sleep apnea)           Subjective:    HPI    HPI: 74 y/o female presents for neurologic follow up, last seen 1/8/19 with me  Per my last note" Patient with PMH of post concussive syndrome due to multiple injuries in the past, MS with a fairly benign course  She is not on any IMD therapy  Patient with memory and concentration difficulties  Re rev prior  MRI c-spine from January 2016 compared to Nov 2014  Stable MR appearance of the cervical cord  Minor prominence of the central canal  No pathological enhancement  Mild cervical spondylosis and osteoarthritis   Re rev last MRI t-spine on 1/23/17 compared to 9/2015  Stable appearance of the thoracic cord  Prominent central canal T7-T9  No indication of demyelinating disease  Re rev MRI brain done in Sept 2015 and stable compared to Nov 2014 with mild degree of chronic demyelinating disease  Today, patient reports she is overall doing well   She remains off IMD therapy for her MS "    Kept above paragraph due to complexities of case  At time of last visit pt with increased disorganized thinking  Pt was also had more diff with focusing  Pt had issues from [place] to march 2019  Pt started working with psychiatrist   Pt notes she is now off all her ssris  Pt feels her mood is better  Pt is still seeing her therapist   I rec to pt that she consider regular follow up with pscychiatry as well to keep her overall mood maintaince  Pt feels she is doing much better since second half of last yr  Pt is now exercising faitfully  Pt also followed up with sleep med due to her irregularities in sleep  Pt had sleep testing done and per pt told she had sleep apnea and on cpap  Pt is following with dr Sidra Baig  Pt just returned her cpap to sleep doc and notified their office  Pt has lost about 33 lbs over the past 6-8 months  She is doing regular gym workouts 6 days per week prior to work  Pt notes no new ms sxs  No diff with eds  No nocturnal awakenings  No ha or n or v   No loc  No sz  No change in bowel or bladder  No change in vision  No diplopia  No loss of vision  No change in speech or swallowing  Pt denies any recent hospitalizations or infections this yr  No fever or chills  No ha or n or v   Pt continues at her present job  Pt notes her bp is under good control now as well  Pt is following up with her pcp as well  pt with ongoing left rotator cuff issues  Pt notes she is watchful with her left shoulder when exercising  Pt notes regular sleep hours at this time  Extended appt ie 25 min to review past year of her ongoing issues with medical, psych and sleep  Rev notes dating back past yr to clear up any questions for pt during appt        The following portions of the patient's history were reviewed and updated as appropriate: allergies, current medications, past family history, past medical history, past social history, past surgical history and problem list and ros rev  Objective:    Blood pressure 130/72, pulse 60, height 5' (1 524 m), weight 72 6 kg (160 lb)  Physical Exam   Constitutional: She appears well-developed and well-nourished  Eyes: Pupils are equal, round, and reactive to light  Lids are normal    Cardiovascular: Intact distal pulses  Neurological: She has normal strength and normal reflexes  Psychiatric: Her speech is normal        Neurological Exam  Mental Status  Awake, alert and oriented to person, place and time  Recent and remote memory are intact  Speech is normal  Language is fluent with no aphasia  Attention and concentration are normal  Fund of knowledge is appropriate for level of education  Cranial Nerves  CN II: Visual acuity is normal  Visual fields full to confrontation  Right funduscopic exam: disc intact  Left funduscopic exam: disc intact  CN III, IV, VI: Extraocular movements intact bilaterally  Normal lids and orbits bilaterally  Pupils equal round and reactive to light bilaterally  CN V: Facial sensation is normal   CN VII: Full and symmetric facial movement  CN VIII: Hearing is normal   CN IX, X: Palate elevates symmetrically  Normal gag reflex  CN XI: Shoulder shrug strength is normal   CN XII: Tongue midline without atrophy or fasciculations  Motor  Normal muscle bulk throughout  Normal muscle tone  No abnormal involuntary movements  Strength is 5/5 throughout all four extremities  Sensory  Sensation is intact to light touch, pinprick, vibration and proprioception in all four extremities  Reflexes  Deep tendon reflexes are 2+ and symmetric in all four extremities with downgoing toes bilaterally  Coordination  Right: Finger-to-nose normal  Rapid alternating movement normal   Left: Finger-to-nose normal  Rapid alternating movement normal     Gait  Normal casual, toe, heel and tandem gait  25 FT WALK: 6 81         ROS:    Review of Systems   Constitutional: Negative  Negative for appetite change and fever  HENT: Negative  Negative for hearing loss, tinnitus, trouble swallowing and voice change  Eyes: Negative  Negative for photophobia and pain  Respiratory: Negative  Negative for shortness of breath  Cardiovascular: Negative  Negative for palpitations  Gastrointestinal: Negative  Negative for nausea and vomiting  Endocrine: Negative  Negative for cold intolerance and heat intolerance  Genitourinary: Negative  Negative for dysuria, frequency and urgency  Musculoskeletal: Positive for gait problem and neck pain  Negative for myalgias  Skin: Negative  Negative for rash  Neurological: Negative for dizziness, tremors, seizures, syncope, facial asymmetry, speech difficulty, weakness, light-headedness, numbness and headaches  Hematological: Negative  Does not bruise/bleed easily  Psychiatric/Behavioral: Positive for sleep disturbance  Negative for confusion and hallucinations

## 2021-01-15 ENCOUNTER — TELEPHONE (OUTPATIENT)
Dept: NEUROLOGY | Facility: CLINIC | Age: 66
End: 2021-01-15

## 2021-01-15 NOTE — TELEPHONE ENCOUNTER
Called pt to confirm upcoming apt in 2 weeks on 1/29/21 with Dr Hugo Saavedra    Patient may need to cancel appt due to ins  Change, has medicare and [employer]  Dr Hugo Saavedra is out of network for this ins which means patient will need pay for an out of network provider  Patient does not want to change providers however can not afford the expense of  An out of network cost    Asked pt if any new/worsening symptoms to report? Asked pt if they are unable to keep apt or interested in virtual apt to please call office, also advised of no show fee  Advised we will call closer to day of apt for COVID screening

## 2021-01-29 ENCOUNTER — OFFICE VISIT (OUTPATIENT)
Dept: NEUROLOGY | Facility: CLINIC | Age: 66
End: 2021-01-29
Payer: COMMERCIAL

## 2021-01-29 VITALS
BODY MASS INDEX: 31.41 KG/M2 | WEIGHT: 160 LBS | SYSTOLIC BLOOD PRESSURE: 110 MMHG | HEIGHT: 60 IN | RESPIRATION RATE: 12 BRPM | HEART RATE: 58 BPM | DIASTOLIC BLOOD PRESSURE: 76 MMHG

## 2021-01-29 DIAGNOSIS — G47.33 OSA (OBSTRUCTIVE SLEEP APNEA): ICD-10-CM

## 2021-01-29 DIAGNOSIS — G35 MULTIPLE SCLEROSIS (HCC): Primary | ICD-10-CM

## 2021-01-29 DIAGNOSIS — M54.2 NECK PAIN: ICD-10-CM

## 2021-01-29 PROCEDURE — 99214 OFFICE O/P EST MOD 30 MIN: CPT | Performed by: PSYCHIATRY & NEUROLOGY

## 2021-01-29 NOTE — ASSESSMENT & PLAN NOTE
Pt here for neuro follow up  Overall doing well in the past 10 months  No close covid contacts  Pt is well aware of cdc recommendations in setting of covid  Pt cont to exercise at home and cont to work on weight loss  Pt exam stable  Pt now able to get imaging due to insurance coverage  Pt needs updated imaging  Needs mri head and  cspine with and without contrast for her ms and increased vestibular and neck issues  Pt to call 2 days after imaging  Needs labs one week pre mris

## 2021-01-29 NOTE — PROGRESS NOTES
Patient ID: Xochitl Harris is a 77 y o  female  Assessment/Plan:    Multiple sclerosis ([facility] [address] )  Pt here for neuro follow up  Overall doing well in the past 10 months  No close covid contacts  Pt is well aware of cdc recommendations in setting of covid  Pt cont to exercise at home and cont to work on weight loss  Pt exam stable  Pt now able to get imaging due to insurance coverage  Pt needs updated imaging  Needs mri head and  cspine with and without contrast for her ms and increased vestibular and neck issues  Pt to call 2 days after imaging  Needs labs one week pre mris      CURT (obstructive sleep apnea)  Follow up with sleep med       Diagnoses and all orders for this visit:    Multiple sclerosis ([facility] [address] )  -     MRI brain MS wo and w contrast; Future  -     MRI cervical spine with and without contrast; Future  -     BUN; Future  -     Creatinine, serum; Future    Neck pain  -     MRI cervical spine with and without contrast; Future    CURT (obstructive sleep apnea)           Subjective:    HPI    HPI: 78 y/o female presents for neurologic follow up, last seen 3/12/20 with me    Per my last note" Patient with PMH of post concussive syndrome due to multiple injuries in the past, MS with a fairly benign course  She is not on any IMD therapy  Patient with memory and concentration difficulties  Re rev prior  MRI c-spine from January 2016 compared to Nov 2014  Stable MR appearance of the cervical cord  Minor prominence of the central canal  No pathological enhancement  Mild cervical spondylosis and osteoarthritis   Re rev last MRI t-spine on 1/23/17 compared to 9/2015  Stable appearance of the thoracic cord  Prominent central canal T7-T9  No indication of demyelinating disease  Re rev MRI brain done in Sept 2015 and stable compared to Nov 2014 with mild degree of chronic demyelinating disease  Today, patient reports she is overall doing well  She remains off IMD therapy for her MS    At time of last visit pt with increased disorganized thinking  Pt was also had more diff with focusing  Pt had issues from [place] to march 2019  Pt started working with psychiatrist   Pt notes she is now off all her ssris  Pt feels her mood is better  Pt is now exercising faitfully "  Kept above paragraph due to complexities of pt case  Overall MS wise no new sxs to date over the past yr except for some increased vestibular sxs, feeling off balance with head or eye movement  Pt is also following with optho as well  Pt has had several rxs for imaging but not done due to insurance reasons  Pt notes she is now 77 and has better insurance and study/ test coverage  Strongly recommend updated imaging in light of her ms history for both head and c spine  Pt also with int paresthesias of upper ext  Pt with known neck pain as well  Pt notes occ urinary frequency as well  Pt to have updated imaging and call us for results 2 days after studies  No other new sxs  No falls or trips  No loc  Pt did have injury hitting her foot and breaking a toe right after [place]  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 and injured her right hand , s/p hand surgery in early jan  Pt is following up with ortho team   Pt is also seeing chiropractor for her neck and back  Pt has not been seen by psychiatry or therapist this past yr  Pt notes issues related to covid  Reminded pt that many psych docs now doing more virtual appts  Pt will look into reconnecting with her therapist   No new mood issues  overalll pt feeling better and remains off ssris  Pt also following with her pcp  Pt notes occ urinary frequency  No recent hospitalizations  No recent infections  Pt cont to exercise regularly  Not going to gym due to covid concerns and now doing everything at home with tread mills, bands etc   Pt lost total of 40 lbs                The following portions of the patient's history were reviewed and updated as appropriate: allergies, current medications, past family history, past medical history, past social history, past surgical history and problem list and med rec and ros rev  Objective:    Blood pressure 110/76, pulse 58, resp  rate 12, height 5' (1 524 m), weight 72 6 kg (160 lb)  Physical Exam  Constitutional:       General: She is not in acute distress  Appearance: She is not ill-appearing  Eyes:      General: Lids are normal       Extraocular Movements: Extraocular movements intact  Pupils: Pupils are equal, round, and reactive to light  Musculoskeletal:      Right lower leg: No edema  Left lower leg: No edema  Neurological:      Mental Status: She is alert  Coordination: Coordination is intact  Deep Tendon Reflexes: Strength normal and reflexes are normal and symmetric  Psychiatric:         Speech: Speech normal          Neurological Exam  Mental Status  Alert  Speech is normal  Language is fluent with no aphasia  Cranial Nerves  CN II: Visual acuity is normal  Visual fields full to confrontation  Right funduscopic exam: disc intact  Left funduscopic exam: disc intact  CN III, IV, VI: Extraocular movements intact bilaterally  Normal lids and orbits bilaterally  Pupils equal round and reactive to light bilaterally  CN V: Facial sensation is normal   CN VII: Full and symmetric facial movement  CN VIII: Hearing is normal   CN IX, X: Palate elevates symmetrically  Normal gag reflex  CN XI: Shoulder shrug strength is normal   CN XII: Tongue midline without atrophy or fasciculations  Motor  Normal muscle bulk throughout  Normal muscle tone  No abnormal involuntary movements  Strength is 5/5 throughout all four extremities  Sensory  Sensation is intact to light touch, pinprick, vibration and proprioception in all four extremities  Reflexes  Deep tendon reflexes are 2+ and symmetric in all four extremities with downgoing toes bilaterally      Coordination  Finger-to-nose, rapid alternating movements and heel-to-shin normal bilaterally without dysmetria  Gait  Casual gait is normal including stance, stride, and arm swing  ROS:    Review of Systems   Constitutional: Negative  Negative for appetite change and fever  HENT: Negative  Negative for hearing loss, tinnitus, trouble swallowing and voice change  Eyes: Negative  Negative for photophobia and pain  Respiratory: Negative  Negative for shortness of breath  Cardiovascular: Negative  Negative for palpitations  Gastrointestinal: Negative  Negative for nausea and vomiting  Endocrine: Negative  Negative for cold intolerance  Genitourinary: Negative  Negative for dysuria, frequency and urgency  Musculoskeletal: Positive for myalgias  Negative for neck pain  Skin: Negative  Negative for rash  Neurological: Positive for dizziness, light-headedness and numbness  Negative for tremors, seizures, syncope, facial asymmetry, speech difficulty, weakness and headaches  Balance problems   Hematological: Negative  Does not bruise/bleed easily  Psychiatric/Behavioral: Negative  Negative for confusion, hallucinations and sleep disturbance

## 2021-02-08 ENCOUNTER — LAB (OUTPATIENT)
Dept: LAB | Facility: HOSPITAL | Age: 66
End: 2021-02-08
Attending: PSYCHIATRY & NEUROLOGY
Payer: COMMERCIAL

## 2021-02-08 DIAGNOSIS — G35 MULTIPLE SCLEROSIS (HCC): ICD-10-CM

## 2021-02-08 LAB
BUN SERPL-MCNC: 15 MG/DL (ref 5–25)
CREAT SERPL-MCNC: 0.81 MG/DL (ref 0.6–1.3)
GFR SERPL CREATININE-BSD FRML MDRD: 75 ML/MIN/1.73SQ M

## 2021-02-08 PROCEDURE — 36415 COLL VENOUS BLD VENIPUNCTURE: CPT

## 2021-02-08 PROCEDURE — 82565 ASSAY OF CREATININE: CPT

## 2021-02-08 PROCEDURE — 84520 ASSAY OF UREA NITROGEN: CPT

## 2021-02-15 ENCOUNTER — HOSPITAL ENCOUNTER (OUTPATIENT)
Dept: MRI IMAGING | Facility: HOSPITAL | Age: 66
Discharge: HOME | End: 2021-02-15
Attending: PSYCHIATRY & NEUROLOGY
Payer: COMMERCIAL

## 2021-02-15 DIAGNOSIS — G35 MULTIPLE SCLEROSIS (HCC): ICD-10-CM

## 2021-02-15 DIAGNOSIS — M54.2 NECK PAIN: ICD-10-CM

## 2021-02-15 PROCEDURE — 70553 MRI BRAIN STEM W/O & W/DYE: CPT

## 2021-02-15 PROCEDURE — 72156 MRI NECK SPINE W/O & W/DYE: CPT

## 2021-02-15 PROCEDURE — G1004 CDSM NDSC: HCPCS

## 2021-02-15 PROCEDURE — A9585 GADOBUTROL INJECTION: HCPCS | Performed by: PSYCHIATRY & NEUROLOGY

## 2021-02-15 RX ADMIN — GADOBUTROL 7 ML: 604.72 INJECTION INTRAVENOUS at 18:59

## 2021-02-17 ENCOUNTER — TELEPHONE (OUTPATIENT)
Dept: NEUROLOGY | Facility: CLINIC | Age: 66
End: 2021-02-17
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
## 2021-02-17 NOTE — TELEPHONE ENCOUNTER
----- Message from Emelia Victoria MD sent at 2/17/2021  3:16 PM EST -----  Let pt know no new lesions on mri head or c spine  Pt with continued ddd in c spine    If any neck pain, could refer to ortho

## 2021-03-10 DIAGNOSIS — Z23 ENCOUNTER FOR IMMUNIZATION: ICD-10-CM

## 2021-03-17 ENCOUNTER — IMMUNIZATIONS (OUTPATIENT)
Dept: FAMILY MEDICINE CLINIC | Facility: HOSPITAL | Age: 66
End: 2021-03-17

## 2021-03-17 DIAGNOSIS — Z23 ENCOUNTER FOR IMMUNIZATION: Primary | ICD-10-CM

## 2021-03-17 PROCEDURE — 91300 SARS-COV-2 / COVID-19 MRNA VACCINE (PFIZER-BIONTECH) 30 MCG: CPT

## 2021-03-17 PROCEDURE — 0001A SARS-COV-2 / COVID-19 MRNA VACCINE (PFIZER-BIONTECH) 30 MCG: CPT

## 2021-04-08 ENCOUNTER — IMMUNIZATIONS (OUTPATIENT)
Dept: FAMILY MEDICINE CLINIC | Facility: HOSPITAL | Age: 66
End: 2021-04-08

## 2021-04-08 DIAGNOSIS — Z23 ENCOUNTER FOR IMMUNIZATION: Primary | ICD-10-CM

## 2021-04-08 PROCEDURE — 0002A SARS-COV-2 / COVID-19 MRNA VACCINE (PFIZER-BIONTECH) 30 MCG: CPT

## 2021-04-08 PROCEDURE — 91300 SARS-COV-2 / COVID-19 MRNA VACCINE (PFIZER-BIONTECH) 30 MCG: CPT

## 2021-05-05 ENCOUNTER — TELEPHONE (OUTPATIENT)
Dept: NEUROLOGY | Facility: CLINIC | Age: 66
End: 2021-05-05

## 2021-05-05 NOTE — TELEPHONE ENCOUNTER
Patient called to report hypersensivity  Patient states since hand surgery 1/7/2021 she has noticed she is very sensitive to pain  Patient says if she has an injury, bumps into something, gets a shot such as Covid-19 vaccine it is as if her nerve endings "are raw "  Patient states she used to have a very high pain tolerance  Patient denies any other symptoms  No recent illnesses  Patient has not followed up with psychiatry as recommended at [address] [place] [address] (1/29)  Patient received Covid-19 vaccine with second dose on April 8  Patient questioning if this could be related to her MS      MRI brain/c-spine- 2/2021

## 2021-05-05 NOTE — TELEPHONE ENCOUNTER
Cannot really say that is directly related to her MS  She had imaging in Feb and all stable  Typically wouldn't see such diffuse symptoms such as "all over" hypersensitivity  Would have her see PCP to ensure everything stable from a medical standpoint

## 2021-05-06 NOTE — TELEPHONE ENCOUNTER
Called patient and reviewed recommendations from Betty   Patient verbalized understanding and states she will contact her PCP to rule out any medical causes

## 2021-07-29 ENCOUNTER — OFFICE VISIT (OUTPATIENT)
Dept: NEUROLOGY | Facility: CLINIC | Age: 66
End: 2021-07-29
Payer: COMMERCIAL

## 2021-07-29 VITALS
HEIGHT: 60 IN | WEIGHT: 163 LBS | SYSTOLIC BLOOD PRESSURE: 174 MMHG | DIASTOLIC BLOOD PRESSURE: 90 MMHG | BODY MASS INDEX: 32 KG/M2

## 2021-07-29 DIAGNOSIS — R56.00 SIMPLE FEBRILE CONVULSIONS (HCC): ICD-10-CM

## 2021-07-29 DIAGNOSIS — G35 MULTIPLE SCLEROSIS (HCC): Primary | ICD-10-CM

## 2021-07-29 DIAGNOSIS — M54.2 NECK PAIN: ICD-10-CM

## 2021-07-29 PROCEDURE — 99214 OFFICE O/P EST MOD 30 MIN: CPT | Performed by: PSYCHIATRY & NEUROLOGY

## 2021-07-29 PROCEDURE — 3008F BODY MASS INDEX DOCD: CPT | Performed by: PSYCHIATRY & NEUROLOGY

## 2021-07-29 PROCEDURE — 1160F RVW MEDS BY RX/DR IN RCRD: CPT | Performed by: PSYCHIATRY & NEUROLOGY

## 2021-07-29 PROCEDURE — 1036F TOBACCO NON-USER: CPT | Performed by: PSYCHIATRY & NEUROLOGY

## 2021-07-29 NOTE — ASSESSMENT & PLAN NOTE
Pt with long standing neck pain  Pt with known cervical spondylosis  Pt had updated c spine imaging  No c cord pathology  Pt with multi level ddd and rev each level with pt at appt  Currently no new radicular or myelopathic sxs

## 2021-07-29 NOTE — ASSESSMENT & PLAN NOTE
Pt here for neuro follow up  Overall doing well  Pt just back from trip to [place]  More fatigue now since trip  Pt very busy at work  Pt notes occ mental fogginess ollie exacerbated with the fatigue  rec updated memory labs  Pt with int left knee instability contributing to laxity in that leg  Pt getting gel injections today  Pt had updated mri head and cspine in feb to update her ms studies  Both studies stable and no new wm changes  Pt remains off imd meds  Course stable to date  Pt to call for any new sxs

## 2021-07-29 NOTE — PROGRESS NOTES
Patient ID: Jennifer Stephen is a 74 y o  female  Assessment/Plan:    Multiple sclerosis (HCC)  Pt here for neuro follow up  Overall doing well  Pt just back from trip to [place]  More fatigue now since trip  Pt very busy at work  Pt notes occ mental fogginess ollie exacerbated with the fatigue  rec updated memory labs  Pt with int left knee instability contributing to laxity in that leg  Pt getting gel injections today  Pt had updated mri head and cspine in feb to update her ms studies  Both studies stable and no new wm changes  Pt remains off imd meds  Course stable to date  Pt to call for any new sxs    Neck pain  Pt with long standing neck pain  Pt with known cervical spondylosis  Pt had updated c spine imaging  No c cord pathology  Pt with multi level ddd and rev each level with pt at appt  Currently no new radicular or myelopathic sxs       Diagnoses and all orders for this visit:    Multiple sclerosis ([facility] [address] )  -     Vitamin B12; Future  -     Vitamin B6; Future  -     Vitamin B1, whole blood; Future  -     TSH, 3rd generation with Free T4 reflex; Future    Simple febrile convulsions (HCC)    Neck pain           Subjective:    HPI    HPI: 65 y/o female presents for neurologic follow up, last seen 1/29/21 with me    Per my last note" Patient with PMH of post concussive syndrome due to multiple injuries in the past, MS with a fairly benign course  She is not on any IMD therapy  Patient with memory and concentration difficulties  Re rev prior  MRI c-spine from January 2016 compared to Nov 2014  Stable MR appearance of the cervical cord  Minor prominence of the central canal  No pathological enhancement  Mild cervical spondylosis and osteoarthritis   Re rev last MRI t-spine on 1/23/17 compared to 9/2015  Stable appearance of the thoracic cord  Prominent central canal T7-T9  No indication of demyelinating disease   Re rev MRI brain done in Sept 2015 and stable compared to Nov 2014 with mild degree of chronic demyelinating disease  Today, patient reports she is overall doing well  She remains off IMD therapy for her MS   At time of last visit pt with increased disorganized thinking   Pt was also had more diff with focusing   Pt had issues from [place] to march 2019   Pt started working with psychiatrist  Lebron Cervantes notes she is now off all her ssris   Pt feels her mood is better   Pt is now exercising faitfully "  Kept above paragraph due to complexities of pt case  Pt here for neuro follow up for her ms  MS wise pt overall stable  Pt just back from trip to [place] with her sister  Overall went well  Pt did have fall related to instability of the left knee  Pt notes she is now following with ortho at [facility] and has bone on bone  Pt going for gel injection in her left knee this week  Pt notes no other new sxs except for some int mental fogginess and fatigue  Increased fatigue ollie now since back from trip  Pt notes occ at work taking longer to complete tasks but also getting more responsibility and no issues noted by her employer  Pt had updated mri head and c spine imaigng  Studies done on 2/15/21  Mri head No acute intracranial pathology  Stable chronic demyelinating disease  No new or acute demyelinating lesions  Mri c spine No definite demyelinating disease in the cervical cord but evaluation is somewhat limited due to motion  Slight prominence of the central canal is less apparent  Degenerative spondylosis as described  Rev studies in detail with pt at appt  Also rev each level with pt of degenerative disc disease  Currently no new radicular or myelopathic sxs  Pt denies any new sxs  No falls or trips  No change in bowel or bladder  No LOC, no seizure  No change in speech or swallowing  occ blurring of vision  Pt is overdue to see optho and has upcoming appt already scheduled for next few weeks  No loss of vision  No headache, no nausea or vomiting  No recent hospitalizations  No recent infections   Pt still seeing chiropractor 4 times per year for her neck stiffness  Pt completed her covid vaccination and did have issues with some side effects after second shot for about 36 hours  Pt notes sxs then resolved on own  Pt is following with her pcp for bp management  Pt taking bp meds when she gets into work  rec pt to start day with meds and follow with recording log of measurements  Pt remans off imd meds due to overall stability of case                  The following portions of the patient's history were reviewed and updated as appropriate: allergies, current medications, past family history, past medical history, past social history, past surgical history and problem list and med rev and ros rev  Objective:    Blood pressure (!) 174/90, height 5' (1 524 m), weight 73 9 kg (163 lb)  Physical Exam  Constitutional:       General: She is not in acute distress  Appearance: She is not ill-appearing  Eyes:      General: Lids are normal       Extraocular Movements: Extraocular movements intact  Pupils: Pupils are equal, round, and reactive to light  Musculoskeletal:      Right lower leg: No edema  Left lower leg: No edema  Neurological:      Mental Status: She is alert  Deep Tendon Reflexes: Strength normal and reflexes are normal and symmetric  Psychiatric:         Speech: Speech normal          Neurological Exam  Mental Status  Alert  Recent and remote memory are intact  Speech is normal  Language is fluent with no aphasia  Fund of knowledge is appropriate for level of education  Cranial Nerves  CN II: Visual acuity is normal  Visual fields full to confrontation  Right funduscopic exam: disc intact  Left funduscopic exam: disc intact  CN III, IV, VI: Extraocular movements intact bilaterally  Normal lids and orbits bilaterally  Pupils equal round and reactive to light bilaterally  CN V: Facial sensation is normal   CN VII: Full and symmetric facial movement    CN VIII: Hearing is normal   CN IX, X: Palate elevates symmetrically  Normal gag reflex  CN XI: Shoulder shrug strength is normal   CN XII: Tongue midline without atrophy or fasciculations  Motor  Normal muscle bulk throughout  Normal muscle tone  No abnormal involuntary movements  Strength is 5/5 throughout all four extremities  Sensory  Sensation is intact to light touch, pinprick, vibration and proprioception in all four extremities  Reflexes  Deep tendon reflexes are 2+ and symmetric in all four extremities with downgoing toes bilaterally  Coordination  Right: Finger-to-nose normal  Rapid alternating movement normal   Left: Finger-to-nose normal  Heel-to-shin normal     Gait  Casual gait: Wide stance  ROS:    Review of Systems   Constitutional: Negative  Negative for appetite change and fever  HENT: Negative  Negative for hearing loss, tinnitus, trouble swallowing and voice change  Eyes: Positive for visual disturbance (eyes get blurry and has trouble understanding a text will have to re-read a couple of times)  Negative for photophobia and pain  Respiratory: Negative  Negative for shortness of breath  Cardiovascular: Negative  Negative for palpitations  Gastrointestinal: Negative  Negative for nausea and vomiting  Endocrine: Negative  Negative for cold intolerance  Genitourinary: Negative  Negative for dysuria, frequency and urgency  Musculoskeletal: Positive for gait problem  Negative for myalgias and neck pain  Skin: Negative  Negative for rash  Neurological: Negative for dizziness, tremors, seizures, syncope, facial asymmetry, speech difficulty, weakness, light-headedness, numbness and headaches  Hematological: Negative  Does not bruise/bleed easily  Psychiatric/Behavioral: Positive for confusion (concerned about not grasping words  head feels foggy  )  Negative for hallucinations and sleep disturbance  All other systems reviewed and are negative

## 2021-08-30 ENCOUNTER — TELEPHONE (OUTPATIENT)
Dept: NEUROLOGY | Facility: CLINIC | Age: 66
End: 2021-08-30

## 2021-08-30 NOTE — TELEPHONE ENCOUNTER
Let pt know vit b12, vit B6 and vit B1 are all elevated  If pt is taking supplement, likely does not need to be  Pt also had chol levels checked, probably from pcp with slight elevation in chol and ldl  Pt needs to follow up with pcp for this

## 2021-08-30 NOTE — TELEPHONE ENCOUNTER
Patient called regarding abnormal lab results  Results available in Care Everywhere  [phone] to leave detailed msg  Dr Cruz Must - please advise

## 2021-08-31 NOTE — TELEPHONE ENCOUNTER
Called patient, read Dr Antione Carter message  Patient verbalizes understanding of information  Claims she is taking 1/2 a daily Multivitamin, that is it  Has not made any changes it diet, etc  Reports they do not have any further questions or concerns at this time

## 2021-11-11 ENCOUNTER — IMMUNIZATIONS (OUTPATIENT)
Dept: FAMILY MEDICINE CLINIC | Facility: HOSPITAL | Age: 66
End: 2021-11-11

## 2021-11-11 DIAGNOSIS — Z23 ENCOUNTER FOR IMMUNIZATION: Primary | ICD-10-CM

## 2021-11-11 PROCEDURE — 0001A COVID-19 PFIZER VACC 0.3 ML: CPT

## 2021-11-11 PROCEDURE — 91300 COVID-19 PFIZER VACC 0.3 ML: CPT

## 2022-01-26 ENCOUNTER — TELEPHONE (OUTPATIENT)
Dept: NEUROLOGY | Facility: CLINIC | Age: 67
End: 2022-01-26

## 2022-01-26 NOTE — TELEPHONE ENCOUNTER
Pt calling to report visual changes in the last week  States it is same as ocular migraine she has but more frequent  Pt reports in the last week she has had these changes 4x  Reports a small "c" shape that looks like it is made with "waterfall water " This will get [place] and then slowly disappear over 30 minutes  Prior to this occurring she will strain to see clearly  This occurs in both eyes  Does also note double vision 2 days ago that lasted 30 minutes  No double vision currently  Pt reports she has the "c" changes in her vision normally for past 20 years, but not this frequently  Pt does report that her shingles has returned about 3-4 days ago  She has had this on and off for 45 years  She does also report fatigue, body feels tired  Denies numbness, tingling, weakness, bowel/bladder changes  Not on DMT  Pt did contact her PCP and eye doctor and is awaiting response       [phone]  Work [phone] (8am-5pm)  No detailed message

## 2022-01-26 NOTE — TELEPHONE ENCOUNTER
Sounds like visual scotoma with ocular migraine (as she says this has occurred in the past--reviewed prior notes with the same)  Anything else new besides shingles that could increase her migraine frequency? Is she staying hydrated, eating regularly/not skipping meals, getting good sleep? Does not appear she is on anything for migraines specifically  She could start taking magnesium oxide 200-250mg daily and if tolerated (could cause stomach upset), increase to 400-500mg daily, along with B2 (riboflavin) 400mg daily  Agree with seeing optho for eye exam as well, but this just sounds like an increase in the frequency of her ocular migraines    Would have her chart them to see if she can identify any triggers

## 2022-01-27 NOTE — TELEPHONE ENCOUNTER
Called pt and reviewed below message  She reports she was getting less sleep aside from shingles  Noted she did cut her hand cooking recently prior to shingles flaring up  States she has been hydrated and eating well  States she did have PCP visit  Feels symptoms are resolving now  She will check her multivitamin prior to adding supplements to ensure she receives appropriate dose  Notes symptoms seemed to occur when looking at screens/ typing  She will try to keep track of her symptoms to see if she can identify triggers

## 2022-02-04 ENCOUNTER — OFFICE VISIT (OUTPATIENT)
Dept: NEUROLOGY | Facility: CLINIC | Age: 67
End: 2022-02-04
Payer: COMMERCIAL

## 2022-02-04 DIAGNOSIS — M54.2 NECK PAIN: ICD-10-CM

## 2022-02-04 DIAGNOSIS — G35 MULTIPLE SCLEROSIS (HCC): Primary | ICD-10-CM

## 2022-02-04 DIAGNOSIS — F07.81 POSTCONCUSSION SYNDROME: ICD-10-CM

## 2022-02-04 PROCEDURE — 99214 OFFICE O/P EST MOD 30 MIN: CPT | Performed by: PSYCHIATRY & NEUROLOGY

## 2022-02-04 NOTE — ASSESSMENT & PLAN NOTE
Pt here for neuro follow up  Last seen in July  Overall doing well  No recent infection  No recent hospitalization  No issue with covid booster  Newest issue as of end of dec, pt with waterfall feeling in both eyes lasting about 30 min  Pt notes c crescent as well and occ needing to strain to see  Pt notes brief diplopia  Exam stable  Will update mri head  Will update diplopia labs  Pt needs further eval with optho  Pt notes she called her optho office and told to call back if sxs re occur  Due to recurrence over 5 days, pt strongly encouraged to see her optho team ,  [facility]  Will also ask staff to call to help pt get fit in appt as well

## 2022-02-04 NOTE — PROGRESS NOTES
Patient ID: Maribel Haro is a 74 y o  female  Assessment/Plan:    Multiple sclerosis (HCC)  Pt here for neuro follow up  Last seen in July  Overall doing well  No recent infection  No recent hospitalization  No issue with covid booster  Newest issue as of end of dec, pt with waterfall feeling in both eyes lasting about 30 min  Pt notes c crescent as well and occ needing to strain to see  Pt notes brief diplopia  Exam stable  Will update mri head  Will update diplopia labs  Pt needs further eval with optho  Pt notes she called her optho office and told to call back if sxs re occur  Due to recurrence over 5 days, pt strongly encouraged to see her optho team ,  Pt established at  eye care [facility] for past 20 yrs  Will also ask staff to call to help pt get fit in appt as well    Postconcussion syndrome  No new or active sxs  Exam stable  No ha or n or v  Pt to call for any new sxs    Neck pain  Long standing  No new radicular or myelopathic sxs  Exam stable       Diagnoses and all orders for this visit:    Multiple sclerosis ([facility] [address] )  -     Myasthenia Gravis Panel 1; Future  -     TSH, 3rd generation with Free T4 reflex; Future  -     Creatine Kinase, Total; Future  -     MRI brain MS wo contrast; Future    Postconcussion syndrome    Neck pain           Subjective:    HPI    HPI: 65 y/o female presents for neurologic follow up, last seen 7/29/21 with me    Per my last note" Patient with PMH of post concussive syndrome due to multiple injuries in the past, MS with a fairly benign course  She is not on any IMD therapy  Patient with memory and concentration difficulties  Re rev prior  MRI c-spine from January 2016 compared to Nov 2014  Stable MR appearance of the cervical cord  Minor prominence of the central canal  No pathological enhancement  Mild cervical spondylosis and osteoarthritis   Re rev last MRI t-spine on 1/23/17 compared to 9/2015  Stable appearance of the thoracic cord  Prominent central canal T7-T9  No indication of demyelinating disease  Re rev MRI brain done in Sept 2015 and stable compared to Nov 2014 with mild degree of chronic demyelinating disease  Today, patient reports she is overall doing well  She remains off IMD therapy for her MS   At time of last visit pt with increased disorganized thinking   Pt was also had more diff with focusing   Pt had issues from [place] to march 2019   Pt started working with psychiatrist  Ladonna Sharma notes she is now off all her ssris   Pt feels her mood is better   Pt is now exercising faitfully "  Kept above paragraph due to complexities of pt case  Pt last seen in July  Since last visit, no new MS sxs  Pt denies any new sxs  No falls or trips  No change in bowel or bladder  No LOC, no seizure  No change in speech or swallowing  No change in vision  No loss of vision  No diplopia  No loss of vision  No headache, no nausea or vomiting  No recent hospitalizations  No recent infections  No fever or chills  No cough or sob  Pt had updated labs in aug  Pt with elevated vit b12 at greater than 1450, elevated vit B6 of 594, elevated vit b1 of 200  Pt notes she has been taking multiple supplements for over 40 yrs  Rev with pt likely does not need any addiitional vit B supplementation at this time  Pt notes recurrence of shingles left thumb area  Pt notes this is same place she has had in past   Pt notes now lesions scabbed  Pt had some left sided neck pain at time of shingles, now resolved  Pt had issues with vision at end of jan  Pt had waterfall sensation in both eyes lasting about 30 min interval   Pt also noted c shaped crescent in vision a well  Pt has noted this sensation for the past 20 yrs on occasion  During period of 5 days in end of Jan, pt noted more frequently  Pt notes she had to strain to see more clearly  Pt  Notes no issues with work or driving  Sxs were transient  Pt did not have ha at time or any other focal sxs  Pos temporal pulses today    Pt had reached out to pcp and optho office  Pt notes optho told her to call back if recurrence  Cannot explain sxs entirely from ms standpoint  Pt did have transient diplopia very briefly without any other associated brainstem or bulbar sxs  rec updated mri head imaging as well as need for optho eval asap  Reminded pt if any recurrence or changes in vision, must seek more immediate care asap  Pt called during appt and got appt in 2 working days  Currently no ha or n or v    No visual disturbance or diplopia  Pt had covid vax plus more recent booster  Pt remans off imd meds due to overall stability of case              The following portions of the patient's history were reviewed and updated as appropriate: allergies, current medications, past family history, past medical history, past social history, past surgical history and problem list and med rec and ros rev  Objective: There were no vitals taken for this visit  Physical Exam  Constitutional:       General: She is not in acute distress  Appearance: She is not ill-appearing  Eyes:      General: Lids are normal       Extraocular Movements: Extraocular movements intact  Pupils: Pupils are equal, round, and reactive to light  Musculoskeletal:      Right lower leg: No edema  Left lower leg: No edema  Neurological:      Mental Status: She is alert  Deep Tendon Reflexes: Strength normal and reflexes are normal and symmetric  Psychiatric:         Speech: Speech normal          Neurological Exam  Mental Status  Alert  Recent and remote memory are intact  Speech is normal  Language is fluent with no aphasia  Fund of knowledge is appropriate for level of education  Cranial Nerves  CN II: Visual acuity is normal  Visual fields full to confrontation  CN III, IV, VI: Extraocular movements intact bilaterally  Normal lids and orbits bilaterally  Pupils equal round and reactive to light bilaterally    CN V: Facial sensation is normal   CN VII: Full and symmetric facial movement  CN VIII: Hearing is normal   CN IX, X: Palate elevates symmetrically  Normal gag reflex  CN XI: Shoulder shrug strength is normal   CN XII: Tongue midline without atrophy or fasciculations  Motor  Normal muscle bulk throughout  Normal muscle tone  No abnormal involuntary movements  Strength is 5/5 throughout all four extremities  Sensory  Sensation is intact to light touch, pinprick, vibration and proprioception in all four extremities  Reflexes  Deep tendon reflexes are 2+ and symmetric in all four extremities with downgoing toes bilaterally  Coordination  Right: Finger-to-nose normal  Rapid alternating movement normal   Left: Finger-to-nose normal  Heel-to-shin normal     Gait  Casual gait is normal including stance, stride, and arm swing  ROS:    Review of Systems  10 point Ros rev in detail with pt  No pertinent positives

## 2022-02-04 NOTE — PROGRESS NOTES
Patient ID: Jason Nesbitt is a 74 y o  female  Assessment/Plan:    Multiple sclerosis (HCC)  Pt here for neuro follow up  Last seen in July  Overall doing well  No recent infection  No recent hospitalization  No issue with covid booster  Newest issue as of end of dec, pt with waterfall feeling in both eyes lasting about 30 min  Pt notes c crescent as well and occ needing to strain to see  Pt notes brief diplopia  Exam stable  Will update mri head  Will update diplopia labs  Pt needs further eval with optho  Pt notes she called her optho office and told to call back if sxs re occur  Due to recurrence over 5 days, pt strongly encouraged to see her optho team ,  [facility]  Will also ask staff to call to help pt get fit in appt as well    Postconcussion syndrome  No new or active sxs  Exam stable  No ha or n or v  Pt to call for any new sxs    Neck pain  Long standing  No new radicular or myelopathic sxs  Exam stable       {Assess/PlanSmartLinks:88086}       Subjective:    HPI    {[facility] HPI texts:33639}    {Common ambulatory SmartLinks:24822}         Objective: There were no vitals taken for this visit      Physical Exam    Neurological Exam      ROS:    Review of Systems

## 2022-02-07 ENCOUNTER — LAB (OUTPATIENT)
Dept: LAB | Facility: HOSPITAL | Age: 67
End: 2022-02-07
Attending: PSYCHIATRY & NEUROLOGY
Payer: COMMERCIAL

## 2022-02-07 ENCOUNTER — TELEPHONE (OUTPATIENT)
Dept: NEUROLOGY | Facility: CLINIC | Age: 67
End: 2022-02-07

## 2022-02-07 DIAGNOSIS — G35 MULTIPLE SCLEROSIS (HCC): ICD-10-CM

## 2022-02-07 LAB
CK SERPL-CCNC: 48 U/L (ref 26–192)
TSH SERPL DL<=0.05 MIU/L-ACNC: 2.77 UIU/ML (ref 0.36–3.74)

## 2022-02-07 PROCEDURE — 86255 FLUORESCENT ANTIBODY SCREEN: CPT

## 2022-02-07 PROCEDURE — 84443 ASSAY THYROID STIM HORMONE: CPT

## 2022-02-07 PROCEDURE — 82550 ASSAY OF CK (CPK): CPT

## 2022-02-07 PROCEDURE — 36415 COLL VENOUS BLD VENIPUNCTURE: CPT

## 2022-02-07 PROCEDURE — 83519 RIA NONANTIBODY: CPT

## 2022-02-07 NOTE — TELEPHONE ENCOUNTER
Pt calling to report she forgot lab scripts and would like to have them completed today  She states she will be going to a [facility] lab  Advised paper scripts are not needed at a [facility] lab, orders are in the system  Pt verbalizes understanding

## 2022-02-21 ENCOUNTER — TELEPHONE (OUTPATIENT)
Dept: NEUROLOGY | Facility: CLINIC | Age: 67
End: 2022-02-21

## 2022-02-21 NOTE — TELEPHONE ENCOUNTER
Labs are all normal/neg    Per the report they could not do [facility] ab modulating due to shortage of the reagent, but binding and blocking were neg, as well as other components, so this would also likely be neg

## 2022-02-21 NOTE — TELEPHONE ENCOUNTER
Patient calling regarding Myasthenia Gravis panel lab results  Result available in Epic          [phone] to leave detailed msg  Dr Nini Rascon - please advise

## 2022-02-28 ENCOUNTER — HOSPITAL ENCOUNTER (OUTPATIENT)
Dept: MRI IMAGING | Facility: HOSPITAL | Age: 67
Discharge: HOME | End: 2022-02-28
Attending: PSYCHIATRY & NEUROLOGY
Payer: COMMERCIAL

## 2022-02-28 DIAGNOSIS — G35 MULTIPLE SCLEROSIS (HCC): ICD-10-CM

## 2022-02-28 PROCEDURE — G1004 CDSM NDSC: HCPCS

## 2022-02-28 PROCEDURE — 70551 MRI BRAIN STEM W/O DYE: CPT

## 2022-08-04 ENCOUNTER — OFFICE VISIT (OUTPATIENT)
Dept: NEUROLOGY | Facility: CLINIC | Age: 67
End: 2022-08-04
Payer: COMMERCIAL

## 2022-08-04 VITALS
HEART RATE: 73 BPM | HEIGHT: 60 IN | TEMPERATURE: 97.1 F | WEIGHT: 174 LBS | DIASTOLIC BLOOD PRESSURE: 82 MMHG | BODY MASS INDEX: 34.16 KG/M2 | SYSTOLIC BLOOD PRESSURE: 138 MMHG

## 2022-08-04 DIAGNOSIS — M54.2 NECK PAIN: ICD-10-CM

## 2022-08-04 DIAGNOSIS — G35 MULTIPLE SCLEROSIS (HCC): Primary | ICD-10-CM

## 2022-08-04 DIAGNOSIS — F07.81 POSTCONCUSSION SYNDROME: ICD-10-CM

## 2022-08-04 PROCEDURE — 99214 OFFICE O/P EST MOD 30 MIN: CPT | Performed by: PSYCHIATRY & NEUROLOGY

## 2022-08-04 NOTE — ASSESSMENT & PLAN NOTE
Currently neck pain at baseline  No recent radicular or myelopathic sxs  Cont with conservative mx  Pt follows with chiropractor monthly

## 2022-08-04 NOTE — PROGRESS NOTES
Patient ID: Geovanna Funes is a 69 y o  female  Assessment/Plan:    Multiple sclerosis ([facility] [address] )  Pt here today for neuro followup  Overall pt doing about the same  No changes in exam  Pt working full time at same facility since 2005  Pt notes no recent infections  No recent hospitalizations  Exam stable  Most recent updated mri head rev in detail with pt  Mri head from feb stable chronic demylinating disease with mild burden  No acute changes  Pt to remain off imd meds  Pt to call for any new sxs    Postconcussion syndrome  Pt notes no headache, n or v  No dizziness or vertigo  Pt notes some long standing issues with verbal processing but nothing new   Pt actually feels since last concussion , doing much better      Neck pain  Currently neck pain at baseline  No recent radicular or myelopathic sxs  Cont with conservative mx  Pt follows with chiropractor monthly       Diagnoses and all orders for this visit:    Multiple sclerosis ([facility] [address] )    Postconcussion syndrome    Neck pain           Subjective:    HPI    HPI: 68 y/o female presents for neurologic follow up, last seen 2/4/22  with me    Per my last note" Patient with PMH of post concussive syndrome due to multiple injuries in the past, MS with a fairly benign course  She is not on any IMD therapy  Patient with memory and concentration difficulties  Re rev prior  MRI c-spine from January 2016 compared to Nov 2014  Stable MR appearance of the cervical cord  Minor prominence of the central canal  No pathological enhancement  Mild cervical spondylosis and osteoarthritis   Re rev last MRI t-spine on 1/23/17 compared to 9/2015  Stable appearance of the thoracic cord  Prominent central canal T7-T9  No indication of demyelinating disease  Re rev MRI brain done in Sept 2015 and stable compared to Nov 2014 with mild degree of chronic demyelinating disease  Today, patient reports she is overall doing well   She remains off IMD therapy for her MS   At time of last visit pt with increased disorganized thinking   Pt was also had more diff with focusing   Pt had issues from [place] to march 2019   Pt started working with psychiatrist  Ladonna Sharma notes she is now off all her ssris   Pt feels her mood is better   Pt is now exercising faitfully "  Kept above paragraph due to complexities of pt case  Pt last seen in Feb  Since last visit, no new MS sxs  No falls or trips  No new concussions  No change in vision  No loss of vision  No diplopia  No change in bowel or bladder  Pt notes overall doing well  occ more diff with verbal processing  Pt working 8-9 hours per day , same job since 2005  Pt notes more responsibilities and more mulit tasking at work  Pt denies any recent infections  No recent hospitalizations  No recent iv steroids or ms flare  Pt just had updated mri head done since last visit on feb 2022  Stable chronic demylinating disease ,with mild plaque burden  No new lesoins seen  Study rev in detail with pt at appt  Pt notes neck is actually doing well  No new sxs  rom ok  Pt is seeing her chiropractor on regular basis about monthly or earlier  No vertigo or n or v     Pt also [facility]   Pt notes seen on 7/27  No new ocular pathology was told to pt  Staff will work on getting optho report and pt is also going to call since she did not get the paper report either at time of appt for visual field testing  Pt labs for MG negative, nl tsh and nl CK  Pt remains off imd meds  Currently no ocular sxs    Pt remans off imd meds due to overall stability of case             The following portions of the patient's history were reviewed and updated as appropriate: allergies, current medications, past family history, past medical history, past social history, past surgical history and problem list and med rec and ros rev           Objective:    Blood pressure 138/82, pulse 73, temperature (!) 97 1 °F (36 2 °C), height 5' (1 524 m), weight 78 9 kg (174 lb)  Physical Exam  Constitutional:       General: She is not in acute distress  Appearance: She is not ill-appearing  Eyes:      General: Lids are normal       Extraocular Movements: Extraocular movements intact  Pupils: Pupils are equal, round, and reactive to light  Musculoskeletal:      Right lower leg: No edema  Left lower leg: No edema  Neurological:      Mental Status: She is alert  Deep Tendon Reflexes: Strength normal and reflexes are normal and symmetric  Psychiatric:         Speech: Speech normal          Neurological Exam  Mental Status  Alert  Speech is normal  Language is fluent with no aphasia  Cranial Nerves  CN II: Visual acuity is normal  Visual fields full to confrontation  CN III, IV, VI: Extraocular movements intact bilaterally  Normal lids and orbits bilaterally  Pupils equal round and reactive to light bilaterally  CN V: Facial sensation is normal   CN VII: Full and symmetric facial movement  CN VIII: Hearing is normal   CN IX, X: Palate elevates symmetrically  Normal gag reflex  CN XI: Shoulder shrug strength is normal   CN XII: Tongue midline without atrophy or fasciculations  Motor  Normal muscle bulk throughout  Normal muscle tone  No abnormal involuntary movements  Strength is 5/5 throughout all four extremities  Sensory  Sensation is intact to light touch, pinprick, vibration and proprioception in all four extremities  Reflexes  Deep tendon reflexes are 2+ and symmetric in all four extremities  Coordination  Right: Finger-to-nose normal  Rapid alternating movement normal Left: Finger-to-nose normal  Heel-to-shin normal     Gait  Casual gait is normal including stance, stride, and arm swing  ROS:    Review of Systems   Constitutional: Negative  Negative for appetite change and fever  HENT: Negative  Negative for hearing loss, tinnitus, trouble swallowing and voice change  Eyes: Negative    Negative for photophobia and pain    Respiratory: Negative  Negative for shortness of breath  Cardiovascular: Negative  Negative for palpitations  Gastrointestinal: Negative  Negative for nausea and vomiting  Endocrine: Negative  Negative for cold intolerance  Genitourinary: Negative  Negative for dysuria, frequency and urgency  Musculoskeletal: Negative  Negative for myalgias and neck pain  Skin: Negative  Negative for rash  Allergic/Immunologic: Negative  Neurological: Negative  Negative for dizziness, tremors, seizures, syncope, facial asymmetry, speech difficulty, weakness, light-headedness, numbness and headaches  Hematological: Negative  Does not bruise/bleed easily  Psychiatric/Behavioral: Positive for confusion  Negative for hallucinations and sleep disturbance  Having cognitive issues at times   All other systems reviewed and are negative

## 2022-08-04 NOTE — ASSESSMENT & PLAN NOTE
Pt notes no headache, n or v  No dizziness or vertigo  Pt notes some long standing issues with verbal processing but nothing new   Pt actually feels since last concussion , doing much better

## 2022-08-04 NOTE — ASSESSMENT & PLAN NOTE
Pt here today for neuro followup  Overall pt doing about the same  No changes in exam  Pt working full time at same facility since 2005  Pt notes no recent infections  No recent hospitalizations  Exam stable  Most recent updated mri head rev in detail with pt  Mri head from feb stable chronic demylinating disease with mild burden  No acute changes  Pt to remain off imd meds  Pt to call for any new sxs

## 2022-12-12 ENCOUNTER — TELEPHONE (OUTPATIENT)
Dept: NEUROLOGY | Facility: CLINIC | Age: 67
End: 2022-12-12

## 2022-12-12 NOTE — TELEPHONE ENCOUNTER
Received VM transcription: This is Altamese Poles  I'm calling to leave a message for Dr Terry Mata office  I'm having some recent more accelerated cognitive issues and making poor decisions at work and at home, and I need to perhaps have a visit or some cognitive tests possibly  My phone number is [phone]  I told my employer that I would be contacting you and he was glad because he sees the latest changes in my brain capacity  So thank you  I hope you have a good day  Bye bye now

## 2022-12-13 NOTE — TELEPHONE ENCOUNTER
Received VM transcription:    Arleth, this is Horris Feather  I just finished my pre-cataract surgery visit with my GP and one of the issues that came up is also that my cognitive abilities are beginning to decrease more than slightly at times: confused, making mistakes, memory, etc, etc  So I was calling to see if maybe I can accelerate my previous appointment to see Dr Gilberto Krishna  It wasn't going to be until February or March  I'd like to see I think if it's possible to get an appointment in the next few weeks  My GP encouraged seeing my neurologist because of some of the topics  So my phone number as you know, it's [phone]  Thank you very much  I appreciate it  [facility] or [place], but I would like a call back  Thank you  Bye bye now

## 2022-12-13 NOTE — TELEPHONE ENCOUNTER
Dr Ernie Monte agreeable to pt having sooner appointment per  communication between Scheduling team and Dr Ernie Monte  Pt now scheduled for f/u appt w/Dr Ernie Monte on 12/16/22

## 2022-12-16 ENCOUNTER — OFFICE VISIT (OUTPATIENT)
Dept: NEUROLOGY | Facility: CLINIC | Age: 67
End: 2022-12-16

## 2022-12-16 VITALS
TEMPERATURE: 98 F | RESPIRATION RATE: 18 BRPM | SYSTOLIC BLOOD PRESSURE: 134 MMHG | BODY MASS INDEX: 33.38 KG/M2 | OXYGEN SATURATION: 97 % | DIASTOLIC BLOOD PRESSURE: 88 MMHG | HEIGHT: 60 IN | HEART RATE: 58 BPM | WEIGHT: 170 LBS

## 2022-12-16 DIAGNOSIS — L57.0 ACTINIC KERATOSIS OF LEFT CHEEK: ICD-10-CM

## 2022-12-16 DIAGNOSIS — G35 MULTIPLE SCLEROSIS (HCC): ICD-10-CM

## 2022-12-16 DIAGNOSIS — R41.3 MEMORY LOSS: ICD-10-CM

## 2022-12-16 DIAGNOSIS — F07.81 POSTCONCUSSION SYNDROME: Primary | ICD-10-CM

## 2022-12-16 NOTE — ASSESSMENT & PLAN NOTE
Pt here today for fit in appt for increased issues with memory  Pt notes memory issues for past 10-12 yrs since concussion  Pt notes more recently in past several months more issues on job and longer processing time  Pt notes more diff with concentration and attn  Pt notes more issues with attending to task at hand  She notes needing to double check work more often  Updated moca 25/30 rev in office  Main issue with delay recall  rec updated formal neurocognitive testing  Done in past at time of concussion  Also will update neuroquant mri head  Pt already has rx from pcp for memory labs

## 2022-12-16 NOTE — ASSESSMENT & PLAN NOTE
Pt with known MS  Overall doing well  No new or focal findings on exam  Cont off imd meds  Last imaging from this yr ie 2/22- stable chronic demylinating disease

## 2022-12-16 NOTE — PROGRESS NOTES
Patient ID: Julene Leyden is a 69 y o  female  Assessment/Plan:    Memory loss  Pt here today for fit in appt for increased issues with memory  Pt notes memory issues for past 10-12 yrs since concussion  Pt notes more recently in past several months more issues on job and longer processing time  Pt notes more diff with concentration and attn  Pt notes more issues with attending to task at hand  She notes needing to double check work more often  Updated moca 25/30 rev in office  Main issue with delay recall  rec updated formal neurocognitive testing  Done in past at time of concussion  Also will update neuroquant mri head  Pt already has rx from pcp for memory labs    Multiple sclerosis ([facility] )  Pt with known MS  Overall doing well  No new or focal findings on exam  Cont off imd meds  Last imaging from this yr ie 2/22- stable chronic demylinating disease       Diagnoses and all orders for this visit:    Postconcussion syndrome  -     Ambulatory referral to Neuropsychology; Future  -     MRI brain NeuroQuant wo contrast; Future    Memory loss  -     Ambulatory referral to Neuropsychology; Future  -     MRI brain NeuroQuant wo contrast; Future    [facility]  -     MRI brain NeuroQuant wo contrast; Future    Actinic keratosis of left cheek  -     Ambulatory Referral to Dermatology; Future           Subjective:    HPI    HPI: 68 y/o female presents for neurologic follow up, last seen 8/4/22 with me    Per my last note" Patient with PMH of post concussive syndrome due to multiple injuries in the past, MS with a fairly benign course  She is not on any IMD therapy  Patient with memory and concentration difficulties  Re rev prior  MRI c-spine from January 2016 compared to Nov 2014  Stable MR appearance of the cervical cord  Minor prominence of the central canal  No pathological enhancement  Mild cervical spondylosis and osteoarthritis   Re rev last MRI t-spine on 1/23/17 compared to 9/2015   Stable appearance of the thoracic cord  Prominent central canal T7-T9  No indication of demyelinating disease  Re rev MRI brain done in Sept 2015 and stable compared to Nov 2014 with mild degree of chronic demyelinating disease  Today, patient reports she is overall doing well  She remains off IMD therapy for her MS   At time of last visit pt with increased disorganized thinking   Pt was also had more diff with focusing   Pt had issues from [place] to march 2019   Pt started working with psychiatrist  Helio Ackerman notes she is now off all her ssris   Pt feels her mood is better   Pt is now exercising faitfully "  Kept above paragraph due to complexities of pt case  Pt last seen in August    Since last visit, no new MS sxs  No falls or trips  No new concussions  No change in vision  No loss of vision  No diplopia  No change in bowel or bladder  pt here today for fit in appt for worseing of memory  Pt notes sxs particularly involving short term memory  Pt has been at workplace for almost 20 yrs  Pt notes she has always had to take longer to review her projects but it is becoming more noticeable  Pt notes longer time to verbally process conversations  Pt notes more issues with attn to detail  Rev prior mri head from this year ie feb 2022 and stable chronic demylinating disease  Pt had recent visit to [place] with her sister  Pt notes family and work notice processing decline  Pt notes no issues with safety at home or with driving  No issues with dressing or hygiene  No issue with cooking or stove  No issues with finances  Pt notes ongoing issues with memory every since concussion  Pt had neurocognitive testing about 10 yrs ago  Due to current sxs , will update neurocogntive eval and also check mri neuro quant for specific memory complaints  Rev moca testing with her as well  pcp already has pending memory labs  Pt will complete shortly and also send to me  Pt to keep her regulalry scheduled ms appt in feb    Pt remains off imd meds  Currently no new ocular sxs  No loss of vision  No eye pain and no diplopia  Pt has a non raised pigmented area under her left eye  Pt notes she has had for yrs but not congenital   rec seeing derm- rx provided         The following portions of the patient's history were reviewed and updated as appropriate: allergies, current medications, past family history, past medical history, past social history, past surgical history and problem list and med rec and ros  Objective:    Blood pressure 134/88, pulse 58, temperature 98 °F (36 7 °C), temperature source Tympanic, resp  rate 18, height 5' (1 524 m), weight 77 1 kg (170 lb), SpO2 97 %  Physical Exam  Constitutional:       General: She is not in acute distress  Appearance: She is not ill-appearing  Eyes:      General: Lids are normal       Extraocular Movements: Extraocular movements intact  Pupils: Pupils are equal, round, and reactive to light  Musculoskeletal:      Right lower leg: No edema  Left lower leg: No edema  Neurological:      Mental Status: She is alert  Motor: Motor strength is normal       Deep Tendon Reflexes: Reflexes are normal and symmetric  Psychiatric:         Speech: Speech normal          Neurological Exam  Mental Status  Alert  Able to copy figure  Clock drawing is normal  Speech is normal  Language is fluent with no aphasia  Able to perform serial calculations  Able to spell words backwards  [place] 25/30  Rev in detail with pt at appt  1/5 delay recall  Good language  Good naming  Good clock, serial 7s  visuospatial intact  No diff with 4 part command  No right left confusion  Cranial Nerves  CN II: Visual acuity is normal  Visual fields full to confrontation  CN III, IV, VI: Extraocular movements intact bilaterally  Normal lids and orbits bilaterally  Pupils equal round and reactive to light bilaterally  CN V: Facial sensation is normal   CN VII: Full and symmetric facial movement    CN VIII: Hearing is normal   CN IX, X: Palate elevates symmetrically  Normal gag reflex  CN XI: Shoulder shrug strength is normal   CN XII: Tongue midline without atrophy or fasciculations  Motor  Normal muscle bulk throughout  Normal muscle tone  No abnormal involuntary movements  Strength is 5/5 throughout all four extremities  Sensory  Sensation is intact to light touch, pinprick, vibration and proprioception in all four extremities  Reflexes  Deep tendon reflexes are 2+ and symmetric in all four extremities  Coordination  Right: Finger-to-nose normal  Rapid alternating movement normal Left: Finger-to-nose normal  Heel-to-shin normal     Gait  Casual gait is normal including stance, stride, and arm swing  ROS:    Review of Systems   Constitutional: Negative  Negative for appetite change and fever  HENT: Negative  Negative for hearing loss, tinnitus, trouble swallowing and voice change  Eyes: Negative  Negative for photophobia, pain and visual disturbance  Respiratory: Positive for shortness of breath  Cardiovascular: Negative  Negative for palpitations  Gastrointestinal: Positive for diarrhea  Negative for nausea and vomiting  Endocrine: Negative  Negative for cold intolerance  Genitourinary: Negative  Negative for dysuria, frequency and urgency  Musculoskeletal: Negative  Negative for gait problem, myalgias and neck pain  Skin: Negative  Negative for rash  Allergic/Immunologic: Negative  Neurological: Negative  Negative for dizziness, tremors, seizures, syncope, facial asymmetry, speech difficulty, weakness, light-headedness, numbness and headaches  Hematological: Bruises/bleeds easily  Psychiatric/Behavioral: Positive for confusion  Negative for hallucinations and sleep disturbance          Memory issues-both long and short term

## 2022-12-20 ENCOUNTER — APPOINTMENT (OUTPATIENT)
Dept: LAB | Facility: HOSPITAL | Age: 67
End: 2022-12-20

## 2022-12-20 DIAGNOSIS — R41.3 MEMORY LOSS: ICD-10-CM

## 2022-12-20 LAB
TSH SERPL DL<=0.05 MIU/L-ACNC: 1.69 UIU/ML (ref 0.45–4.5)
VIT B12 SERPL-MCNC: 948 PG/ML (ref 100–900)

## 2022-12-21 LAB — RPR SER QL: NORMAL

## 2022-12-31 ENCOUNTER — HOSPITAL ENCOUNTER (OUTPATIENT)
Dept: RADIOLOGY | Facility: HOSPITAL | Age: 67
Discharge: HOME | End: 2022-12-31
Attending: PSYCHIATRY & NEUROLOGY

## 2022-12-31 DIAGNOSIS — R41.3 MEMORY LOSS: ICD-10-CM

## 2022-12-31 DIAGNOSIS — F07.81 POSTCONCUSSION SYNDROME: ICD-10-CM

## 2022-12-31 DIAGNOSIS — G35 MULTIPLE SCLEROSIS (HCC): ICD-10-CM

## 2023-01-13 ENCOUNTER — TELEPHONE (OUTPATIENT)
Dept: NEUROLOGY | Facility: CLINIC | Age: 68
End: 2023-01-13

## 2023-02-06 ENCOUNTER — TELEPHONE (OUTPATIENT)
Dept: NEUROLOGY | Facility: CLINIC | Age: 68
End: 2023-02-06

## 2023-02-06 NOTE — TELEPHONE ENCOUNTER
Called patient and told her to disregard earlier message,  error  Confirmed her appt for 2/8/23 @ 11:00am [place]      Called to move patient to 8 or 8:30am appt to accommodate extra time for 11:00am patient   Gave direct # until 5, central # for after

## 2023-02-08 ENCOUNTER — OFFICE VISIT (OUTPATIENT)
Dept: NEUROLOGY | Facility: CLINIC | Age: 68
End: 2023-02-08

## 2023-02-08 VITALS
SYSTOLIC BLOOD PRESSURE: 158 MMHG | HEART RATE: 58 BPM | HEIGHT: 60 IN | BODY MASS INDEX: 33.38 KG/M2 | TEMPERATURE: 97.9 F | WEIGHT: 170 LBS | DIASTOLIC BLOOD PRESSURE: 78 MMHG

## 2023-02-08 DIAGNOSIS — R41.3 MEMORY LOSS: ICD-10-CM

## 2023-02-08 DIAGNOSIS — G35 MULTIPLE SCLEROSIS (HCC): Primary | ICD-10-CM

## 2023-02-08 RX ORDER — OMEPRAZOLE 20 MG/1
20 CAPSULE, DELAYED RELEASE ORAL DAILY
COMMUNITY
Start: 2023-01-22

## 2023-02-08 NOTE — ASSESSMENT & PLAN NOTE
Pt here for neuro follow up  Pt just seen 4-6 weeks ago as fit in appt   No new ms sxs  No recent infections  No recent hospitalziations  No falls or trips  Exam stable  Pt had mri head neuro quant performed since last visit 12/31/22  Stable wm lesions c/w mild chronic MS  neuroquant - normal study, not supportive of neurodegenerative process  Rev in depth with pt at appt today  Pt notes no new issues with memory or behavior  Pt notes sxs are self reported  Pt feels she is taking longer at work to process her work load  Pt is already scheduled for updated formal neurocognitive testing with dr Mark Floyd on 2/28/23  Pt strongly encouraged to keep appt and see if she can find prior neurocogntive eval done in past due to history of 8 concussions  Pt will look for document at home and if finds, will bring to her testing appt for comparision  Pt to follow up after testing  Call for any new sxs  Pt remains off imd meds  Stable imaging from MS standpoint and serial neuro exams

## 2023-02-08 NOTE — ASSESSMENT & PLAN NOTE
To date, no new sxs  Pt had neuroquant mri- stable chronic ms findings and normal neuroquant without evidence of neurodegeneration  Pt already scheduled for neurocognitive eval  Follow up after studies  Labs from dec 2022- vit b12 nl at 948, tsh nl 1 69, rpr nr
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
## 2023-02-20 ENCOUNTER — CONSULT (OUTPATIENT)
Dept: DERMATOLOGY | Facility: CLINIC | Age: 68
End: 2023-02-20

## 2023-02-20 VITALS — WEIGHT: 170.1 LBS | BODY MASS INDEX: 33.4 KG/M2 | HEIGHT: 60 IN

## 2023-02-20 DIAGNOSIS — L98.9 LESION OF FACE: ICD-10-CM

## 2023-02-20 DIAGNOSIS — D48.5 NEOPLASM OF UNCERTAIN BEHAVIOR OF SKIN: Primary | ICD-10-CM

## 2023-02-20 NOTE — PATIENT INSTRUCTIONS
1  NEOPLASM OF UNCERTAIN BEHAVIOR OF SKIN    Assessment and Plan:  I have discussed with the patient that a sample of skin via a "skin biopsy” would be potentially helpful to further make a specific diagnosis under the microscope  Based on a thorough discussion of this condition and the management approach to it (including a comprehensive discussion of the known risks, side effects and potential benefits of treatment), the patient (family) agrees to implement the following specific plan:    Procedure:  Skin Biopsy  After a thorough discussion of treatment options and risk/benefits/alternatives (including but not limited to local pain, scarring, dyspigmentation, blistering, possible superinfection, and inability to confirm a diagnosis via histopathology), verbal and written consent were obtained and portion of the rash was biopsied for tissue sample  See below for consent that was obtained from patient and subsequent Procedure Note  PROCEDURE TANGENTIAL (SHAVE) BIOPSY NOTE    INFORMED CONSENT DISCUSSION AND POST-OPERATIVE INSTRUCTIONS FOR PATIENT    I   RATIONALE FOR PROCEDURE  I understand that a skin biopsy allows the Dermatologist to test a lesion or rash under the microscope to obtain a diagnosis  It usually involves numbing the area with numbing medication and removing a small piece of skin; sometimes the area will be closed with sutures  In this specific procedure, sutures are not usually needed  If any sutures are placed, then they are usually need to be removed in 2 weeks or less  I understand that my Dermatologist recommends that a skin "shave" biopsy be performed today  A local anesthetic, similar to the kind that a dentist uses when filling a cavity, will be injected with a very small needle into the skin area to be sampled  The injected skin and tissue underneath "will go to sleep” and become numb so no pain should be felt afterwards    An instrument shaped like a tiny "razor blade" (shave biopsy instrument) will be used to cut a small piece of tissue and skin from the area so that a sample of tissue can be taken and examined more closely under the microscope  A slight amount of bleeding will occur, but it will be stopped with direct pressure and a pressure bandage and any other appropriate methods  I understands that a scar will form where the wound was created  Surgical ointment will be applied to help protect the wound  Sutures are not usually needed  II   RISKS AND POTENTIAL COMPLICATIONS   I understand the risks and potential complications of a skin biopsy include but are not limited to the following:  Bleeding  Infection  Pain  Scar/keloid  Skin discoloration  Incomplete Removal  Recurrence  Nerve Damage/Numbness/Loss of Function  Allergic Reaction to Anesthesia  Biopsies are diagnostic procedures and based on findings additional treatment or evaluation may be required  Loss or destruction of specimen resulting in no additional findings    My Dermatologist has explained to me the nature of the condition, the nature of the procedure, and the benefits to be reasonably expected compared with alternative approaches  My Dermatologist has discussed the likelihood of major risks or complications of this procedure including the specific risks listed above, such as bleeding, infection, and scarring/keloid  I understand that a scar is expected after this procedure  I understand that my physician cannot predict if the scar will form a "keloid," which extends beyond the borders of the wound that is created  A keloid is a thick, painful, and bumpy scar  A keloid can be difficult to treat, as it does not always respond well to therapy, which includes injecting cortisone directly into the keloid every few weeks  While this usually reduces the pain and size of the scar, it does not eliminate it  I understand that photographs may be taken before and after the procedure    These will be maintained as part of the medical providers confidential records and may not be made available to me  I further authorize the medical provider to use the photographs for teaching purposes or to illustrate scientific papers, books, or lectures if in his/her judgment, medical research, education, or science may benefit from its use  I have had an opportunity to fully inquire about the risks and benefits of this procedure and its alternatives  I have been given ample time and opportunity to ask questions and to seek a second opinion if I wished to do so  I acknowledge that there have specifically been no guarantees as to the cosmetic results from the procedure  I am aware that with any procedure there is always the possibility of an unexpected complication  III  POST-PROCEDURAL CARE (WHAT YOU WILL NEED TO DO "AFTER THE BIOPSY" TO OPTIMIZE HEALING)    Keep the area clean and dry  Try NOT to remove the bandage or get it wet for the first 24 hours  Gently clean the area and apply surgical ointment (such as Vaseline petrolatum ointment, which is available "over the counter" and not a prescription) to the biopsy site for up to 2 weeks straight  This acts to protect the wound from the outside world  Sutures are not usually placed in this procedure  If any sutures were placed, return for suture removal as instructed (generally 1 week for the face, 2 weeks for the body)  Take Acetaminophen (Tylenol) for discomfort, if no contraindications  Ibuprofen or aspirin could make bleeding worse  Call our office immediately for signs of infection: fever, chills, increased redness, warmth, tenderness, discomfort/pain, or pus or foul smell coming from the wound  WHAT TO DO IF THERE IS ANY BLEEDING? If a small amount of bleeding is noticed, place a clean cloth over the area and apply firm pressure for ten minutes  Check the wound after 10 minutes of direct pressure    If bleeding persists, try one more time for an additional 10 minutes of direct pressure on the area  If the bleeding becomes heavier or does not stop after the second attempt, or if you have any other questions about this procedure, then please call your [facility]atologist by calling [phone] (SKIN)  I hereby acknowledge that I have reviewed and verified the site with my Dermatologist and have requested and authorized my Dermatologist to proceed with the procedure

## 2023-02-20 NOTE — PROGRESS NOTES
[facility]  Dermatology Clinic Note     Patient Name: Mallory Moreno  Encounter Date: 02/20/2023     Have you been cared for by a [place] [address] Dermatologist in the last 3 years and, if so, which description applies to you? Yes  I have been here within the last 3 years, and my medical history has NOT changed since that time  I am FEMALE/of child-bearing potential     REVIEW OF SYSTEMS:  Have you recently had or currently have any of the following? · No changes in my recent health  PAST MEDICAL HISTORY:  Have you personally ever had or currently have any of the following? If "YES," then please provide more detail  · No changes in my medical history  FAMILY HISTORY:  Any "first degree relatives" (parent, brother, sister, or child) with the following? • No changes in my family's known health  PATIENT EXPERIENCE:    • Do you want the Dermatologist to perform a COMPLETE skin exam today including a clinical examination under the "bra and underwear" areas? NO  • If necessary, do we have your permission to call and leave a detailed message on your Preferred Phone number that includes your specific medical information?   Yes      Allergies   Allergen Reactions   • Bee Venom      Other reaction(s): fatal-hospitalized as child   • Latex    • Mold Extract [Trichophyton]    • Sulfa Antibiotics       Current Outpatient Medications:   •  albuterol (PROVENTIL HFA,VENTOLIN HFA) 90 mcg/act inhaler, Inhale 1 puff every 4 (four) hours as needed , Disp: , Rfl:   •  Cholecalciferol (VITAMIN D3) 2000 units capsule, Take 2,000 Units by mouth daily , Disp: , Rfl:   •  cycloSPORINE (RESTASIS) 0 05 % ophthalmic emulsion, Apply 0 05 % to eye 2 (two) times a day, Disp: , Rfl:   •  Digestive Enzymes (DIGESTIVE ENZYME ULTRA PO), Use daily , Disp: , Rfl:   •  Glucosamine-Chondroitin 750-600 MG TABS, Take by mouth daily , Disp: , Rfl:   •  Homeopathic Products (SIMILASAN DRY EYE RELIEF) SOLN, Apply to eye 2 (two) times a day , Disp: , Rfl:   •  ibuprofen (MOTRIN) 600 mg tablet, Take 600 mg by mouth every 4 (four) hours as needed , Disp: , Rfl:   •  Loratadine 10 MG CAPS, Take 10 mg by mouth as needed , Disp: , Rfl:   •  metoprolol succinate (TOPROL-XL) 25 mg 24 hr tablet, Take 25 mg by mouth Take 1/2 tablet in the morning and half a tablet at night, Disp: , Rfl:   •  Multiple Vitamins-Minerals (MULTIVITAMIN ADULT PO), 2 (two) times a day , Disp: , Rfl:   •  Omega-3 1000 MG CAPS, Take by mouth in the morning, Disp: , Rfl:   •  omeprazole (PriLOSEC) 20 mg delayed release capsule, Take 20 mg by mouth daily, Disp: , Rfl:   •  Vitamin E 400 units TABS, Take by mouth, Disp: , Rfl:   •  ACIDOPHILUS LACTOBACILLUS PO, daily  (Patient not taking: Reported on 2/20/2023), Disp: , Rfl:   •  Onqhbjbju-Trryvsugj-Rdeymelnez (METAFOLBIC PLUS) 6-2-600 MG TABS, daily  (Patient not taking: Reported on 2/20/2023), Disp: , Rfl: 1  •  triamterene-hydrochlorothiazide (DYAZIDE) 37 5-25 mg per capsule, TK 1 C PO QD (Patient not taking: Reported on 2/20/2023), Disp: , Rfl:   •  Turmeric Curcumin 500 MG CAPS, Take by mouth (Patient not taking: Reported on 12/16/2022), Disp: , Rfl:           • Whom besides the patient is providing clinical information about today's encounter?   o NO ADDITIONAL HISTORIAN (patient alone provided history)    Physical Exam and Assessment/Plan by Diagnosis:    NEOPLASM OF UNCERTAIN BEHAVIOR OF SKIN    Physical Exam:  • (Anatomic Location); (Size and Morphological Description); (Differential Diagnosis):  o Left upper cheek; 4 cm; brown patch; differential diagnosis; solar lentigo vs seborrheic keratosis vs lentigo maligna           Additional History of Present Condition:  Located on left cheek  Presents for 10-15 years  Patient states maybe it has been changing recently in terms of size and becoming lighter  Sister has history of melanoma  Mom has history of bladder cancer       Assessment and Plan:  • I have discussed with the patient that a sample of skin via a "skin biopsy” would be potentially helpful to further make a specific diagnosis under the microscope  • Based on a thorough discussion of this condition and the management approach to it (including a comprehensive discussion of the known risks, side effects and potential benefits of treatment), the patient (family) agrees to implement the following specific plan:    o Procedure:  Skin Biopsy  After a thorough discussion of treatment options and risk/benefits/alternatives (including but not limited to local pain, scarring, dyspigmentation, blistering, possible superinfection, and inability to confirm a diagnosis via histopathology), verbal and written consent were obtained and portion of the rash was biopsied for tissue sample  See below for consent that was obtained from patient and subsequent Procedure Note  PROCEDURE TANGENTIAL (SHAVE) BIOPSY NOTE:    • Performing Physician: Jordan Martinez     • Anatomic Location; Clinical Description with size (cm); Pre-Op Diagnosis:   Left upper cheek eyelid; 4 cm; brown patch; differential diagnosis; solar lentigo vs seborrheic keratosis vs lentigo maligna     • Post-op diagnosis: Same     • Local anesthesia: 1% Lidocaine HCL     • Topical anesthesia: None    • Hemostasis: Electrocautery      After obtaining informed consent  at which time there was a discussion about the purpose of biopsy  and low risks of infection and bleeding  The area was prepped and draped in the usual fashion  Anesthesia was obtained with 1% lidocaine with epinephrine  A shave biopsy to an appropriate sampling depth was obtained by Shave (Dermablade or 15 blade) The resulting wound was covered with surgical ointment and bandaged appropriately  The patient tolerated the procedure well without complications and was without signs of functional compromise  Specimen has been sent for review by Dermatopathology      Standard post-procedure care has been explained and has been included in written form within the patient's copy of Informed Consent  INFORMED CONSENT DISCUSSION AND POST-OPERATIVE INSTRUCTIONS FOR PATIENT    I   RATIONALE FOR PROCEDURE  I understand that a skin biopsy allows the Dermatologist to test a lesion or rash under the microscope to obtain a diagnosis  It usually involves numbing the area with numbing medication and removing a small piece of skin; sometimes the area will be closed with sutures  In this specific procedure, sutures are not usually needed  If any sutures are placed, then they are usually need to be removed in 2 weeks or less  I understand that my Dermatologist recommends that a skin "shave" biopsy be performed today  A local anesthetic, similar to the kind that a dentist uses when filling a cavity, will be injected with a very small needle into the skin area to be sampled  The injected skin and tissue underneath "will go to sleep” and become numb so no pain should be felt afterwards  An instrument shaped like a tiny "razor blade" (shave biopsy instrument) will be used to cut a small piece of tissue and skin from the area so that a sample of tissue can be taken and examined more closely under the microscope  A slight amount of bleeding will occur, but it will be stopped with direct pressure and a pressure bandage and any other appropriate methods  I understands that a scar will form where the wound was created  Surgical ointment will be applied to help protect the wound  Sutures are not usually needed      II   RISKS AND POTENTIAL COMPLICATIONS   I understand the risks and potential complications of a skin biopsy include but are not limited to the following:  • Bleeding  • Infection  • Pain  • Scar/keloid  • Skin discoloration  • Incomplete Removal  • Recurrence  • Nerve Damage/Numbness/Loss of Function  • Allergic Reaction to Anesthesia  • Biopsies are diagnostic procedures and based on findings additional treatment or evaluation may be required  • Loss or destruction of specimen resulting in no additional findings    My Dermatologist has explained to me the nature of the condition, the nature of the procedure, and the benefits to be reasonably expected compared with alternative approaches  My Dermatologist has discussed the likelihood of major risks or complications of this procedure including the specific risks listed above, such as bleeding, infection, and scarring/keloid  I understand that a scar is expected after this procedure  I understand that my physician cannot predict if the scar will form a "keloid," which extends beyond the borders of the wound that is created  A keloid is a thick, painful, and bumpy scar  A keloid can be difficult to treat, as it does not always respond well to therapy, which includes injecting cortisone directly into the keloid every few weeks  While this usually reduces the pain and size of the scar, it does not eliminate it  I understand that photographs may be taken before and after the procedure  These will be maintained as part of the medical providers confidential records and may not be made available to me  I further authorize the medical provider to use the photographs for teaching purposes or to illustrate scientific papers, books, or lectures if in his/her judgment, medical research, education, or science may benefit from its use  I have had an opportunity to fully inquire about the risks and benefits of this procedure and its alternatives  I have been given ample time and opportunity to ask questions and to seek a second opinion if I wished to do so  I acknowledge that there have specifically been no guarantees as to the cosmetic results from the procedure  I am aware that with any procedure there is always the possibility of an unexpected complication  III  POST-PROCEDURAL CARE (WHAT YOU WILL NEED TO DO "AFTER THE BIOPSY" TO OPTIMIZE HEALING)    • Keep the area clean and dry    Try NOT to remove the bandage or get it wet for the first 24 hours  • Gently clean the area and apply surgical ointment (such as Vaseline petrolatum ointment, which is available "over the counter" and not a prescription) to the biopsy site for up to 2 weeks straight  This acts to protect the wound from the outside world  • Sutures are not usually placed in this procedure  If any sutures were placed, return for suture removal as instructed (generally 1 week for the face, 2 weeks for the body)  • Take Acetaminophen (Tylenol) for discomfort, if no contraindications  Ibuprofen or aspirin could make bleeding worse  • Call our office immediately for signs of infection: fever, chills, increased redness, warmth, tenderness, discomfort/pain, or pus or foul smell coming from the wound  WHAT TO DO IF THERE IS ANY BLEEDING? If a small amount of bleeding is noticed, place a clean cloth over the area and apply firm pressure for ten minutes  Check the wound after 10 minutes of direct pressure  If bleeding persists, try one more time for an additional 10 minutes of direct pressure on the area  If the bleeding becomes heavier or does not stop after the second attempt, or if you have any other questions about this procedure, then please call your [facility]'s Dermatologist by calling [phone] (SKIN)  I hereby acknowledge that I have reviewed and verified the site with my Dermatologist and have requested and authorized my Dermatologist to proceed with the procedure  Scribe Attestation    I,:  Carol Ma am acting as a scribe while in the presence of the attending physician :       I,:  Zeny Glover MD personally performed the services described in this documentation    as scribed in my presence :         The patient was seen and discussed with Dr Quinton Sorenson       RTC: will call patient with biopsy results and schedule next steps in treatment accordingly    Orion Zepeda  Dermatology PGY-4 Resident Physician

## 2023-02-27 DIAGNOSIS — C43.30 MALIGNANT MELANOMA OF FACE (HCC): Primary | ICD-10-CM

## 2023-02-28 DIAGNOSIS — C43.30 MALIGNANT MELANOMA OF FACE (HCC): Primary | ICD-10-CM

## 2023-03-02 ENCOUNTER — TELEPHONE (OUTPATIENT)
Dept: DERMATOLOGY | Facility: CLINIC | Age: 68
End: 2023-03-02

## 2023-03-02 ENCOUNTER — TELEPHONE (OUTPATIENT)
Dept: HEMATOLOGY ONCOLOGY | Facility: CLINIC | Age: 68
End: 2023-03-02

## 2023-03-02 NOTE — TELEPHONE ENCOUNTER
Hi Serina,    Please call patient back- I am aware she has already seen Dr Dameon Crawford at [facility]  That's great if she met with Dr Xiang Emerson (hematologist) already  I am guessing the calls we made for to schedule her for a full body skin exam in the next month  Please call and do so       Thanks,  [facility]	[place]

## 2023-03-02 NOTE — TELEPHONE ENCOUNTER
Arleth, this is Tamika Pineda, first date 1955  I'm calling today because I received two calls apparently that I missed  I I'm assuming it's regarding my cancer or Melanoma on the situation  And just to let you know, I did go down to [facility] medical days ago and I met with an oncologist and a specialist in your dermatology, milla Dwyer and Ancelmo Soler  In any case, my phone number is [phone]  I'm not sure if this is a call for a follow up or not  I'm still pursuing  We are playing in a few games, cut out the Melanoma under my eye  Thank you very much returning your call  Bytl bye now         Please review

## 2023-03-02 NOTE — TELEPHONE ENCOUNTER
Made attempt to schedule a new patient appointment with Hematology oncology, Patient Decline to schedule an appointment stating that she has went with a provider at [address] [place] [address]  Patients states that if she changes her mind and would like to transfer her care to [facility] she will call back to schedule an appointment   The patient was provided with the hopeline number to call if she would like to schedule an appointment

## 2023-04-07 ENCOUNTER — TELEPHONE (OUTPATIENT)
Dept: PSYCHIATRY | Facility: CLINIC | Age: 68
End: 2023-04-07

## 2023-04-07 NOTE — TELEPHONE ENCOUNTER
No prior authorization required for neuropsychological services       Ref # Adria Cecily [id number]

## 2023-05-10 ENCOUNTER — OFFICE VISIT (OUTPATIENT)
Dept: NEUROLOGY | Facility: CLINIC | Age: 68
End: 2023-05-10

## 2023-05-10 ENCOUNTER — TELEPHONE (OUTPATIENT)
Dept: OTHER | Facility: OTHER | Age: 68
End: 2023-05-10

## 2023-05-10 VITALS
HEIGHT: 60 IN | SYSTOLIC BLOOD PRESSURE: 122 MMHG | DIASTOLIC BLOOD PRESSURE: 88 MMHG | TEMPERATURE: 97.7 F | WEIGHT: 173 LBS | HEART RATE: 66 BPM | BODY MASS INDEX: 33.96 KG/M2

## 2023-05-10 DIAGNOSIS — R41.3 MEMORY LOSS: ICD-10-CM

## 2023-05-10 DIAGNOSIS — G35 MULTIPLE SCLEROSIS (HCC): Primary | ICD-10-CM

## 2023-05-10 RX ORDER — ERYTHROMYCIN 5 MG/G
OINTMENT OPHTHALMIC
COMMUNITY
Start: 2023-03-31

## 2023-05-10 NOTE — ASSESSMENT & PLAN NOTE
Memory loss  To date, no new sxs  Labs from dec 2022- vit b12 nl at 948, tsh nl 1 69, rpr nr  Pt had neuroquant mri- stable chronic ms findings and normal neuroquant without evidence of neurodegeneration  Neurocog eval whenever she does it as her memory has been stable and can defer for now given handling a lot of the dermatology issues

## 2023-05-10 NOTE — PATIENT INSTRUCTIONS
- off DMT/IMD and has had stable imaging and neuro exams  - continue to monitor symptoms   - continue fo f/u with Derm at [facility]   - f/u in 1 year
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
## 2023-06-05 ENCOUNTER — DOCUMENTATION (OUTPATIENT)
Dept: HEMATOLOGY ONCOLOGY | Facility: CLINIC | Age: 68
End: 2023-06-05

## 2023-06-05 NOTE — PROGRESS NOTES
Intake received/ Chart reviewed for services completed outside of [facility]    Pathology completed: 2-20-23 & Surgical Pathology done on 3-24-23        All records needed are in patients chart  No records retrieval needed at this time

## 2023-06-08 ENCOUNTER — CONSULT (OUTPATIENT)
Dept: HEMATOLOGY ONCOLOGY | Facility: CLINIC | Age: 68
End: 2023-06-08
Payer: COMMERCIAL

## 2023-06-08 VITALS
RESPIRATION RATE: 16 BRPM | TEMPERATURE: 97.3 F | HEART RATE: 66 BPM | OXYGEN SATURATION: 96 % | SYSTOLIC BLOOD PRESSURE: 166 MMHG | BODY MASS INDEX: 33.77 KG/M2 | WEIGHT: 172 LBS | DIASTOLIC BLOOD PRESSURE: 80 MMHG | HEIGHT: 60 IN

## 2023-06-08 DIAGNOSIS — C43.30 MALIGNANT MELANOMA OF FACE (HCC): Primary | ICD-10-CM

## 2023-06-08 DIAGNOSIS — G35 MULTIPLE SCLEROSIS (HCC): ICD-10-CM

## 2023-06-08 PROCEDURE — 99204 OFFICE O/P NEW MOD 45 MIN: CPT | Performed by: INTERNAL MEDICINE

## 2023-06-08 NOTE — LETTER
June 8, 2023     [address]    Patient: Marian Rich   YOB: 1955   Date of Visit: 6/8/2023       Dear Dr Korin Galeas:    Thank you for referring Lory Demario to me for evaluation  Below are my notes for this consultation  If you have questions, please do not hesitate to call me  I look forward to following your patient along with you  Sincerely,        Jessa Wayne MD        CC: No Recipients    Jessa Wayne MD  6/8/2023 10:46 PM  Sign when Signing Visit  [address] [address]  [phone]  [phone]     Date of Visit: 6/8/2023  Name: Marian Rich   YOB: 1955        Subjective    VISIT DIAGNOSIS:  Diagnoses and all orders for this visit:    Malignant melanoma of face [facility])  -     Ambulatory Referral to Hematology / Oncology  -     CBC and differential; Future  -     Comprehensive metabolic panel; Future  -     LD,Blood; Future    Multiple sclerosis [facility])        Oncology History   Malignant melanoma of face ([facility] [address] )   2/20/2023 -  Cancer Staged    Staging form: Melanoma of the Skin, AJCC 8th Edition  - Clinical stage from 2/20/2023: Stage IA (cT1a, cN0, cM0) - Signed by Jessa Wayne MD on 6/8/2023 6/8/2023 Initial Diagnosis    Malignant melanoma of face [facility])        Cancer Staging   Malignant melanoma of face [facility])  Staging form: Melanoma of the Skin, AJCC 8th Edition  - Clinical stage from 2/20/2023: Stage IA (cT1a, cN0, cM0) - Signed by Jessa Wayne MD on 6/8/2023          HISTORY OF PRESENT ILLNESS: Marian Rich is a 77 y o  female  who is seen in consultation for new diagnosis of stage Ia melanoma at the request of dermatology  History is obtained from the patient and review of records      She states that she had a spot like a birthmark on the left corner of her eye just below her lower eyelid and partially on her cheek that had been there for 10 to 15 years  It was plaque-like pigmented lesion but was smooth and continuous with her skin  It was not raised or lumpy or bumpy  She thinks may be got a little bit darker over time  It did not bother her  It was not painful, not itching, and not bleeding  Her general practitioner had mentioned to her that she should probably have it looked at  She saw dermatology who biopsied the lesion on 2/20/2023  Pathology demonstrates melanoma, predominantly in situ with scattered foci consistent with invasion with a thickness of 0 4 mm  No ulceration  Randy level II with invasive melanoma focally extending to deep specimen in the thin specimen  Lentigo maligna melanoma histology  No mitoses  No microsatellites, lymphovascular invasion, neurotropism, or regression  MIS extends to the peripheral specimen and invasive melanoma focally extends to the focally deep specimens  He underwent Mohs procedure with Dr Tiff Nunez at [facility] with Dr Zenobia Thorne reconstruction given close proximity to her lower eyelid  Doing well and her scar is healing very nicely  Has twinges of pain every once in a while but otherwise no issues concerns or complaints with her healing scar  Denies any new, changing, concerning skin lesions at this time  No lymphadenopathy  She describes having not too much sun exposure overall as she does not like the heat  She actually cannot be out in the sun too long with the heat given her MS  She was an avid cycler when she was about 20 or 28 when she was in [place], however she would cycle early in the morning or late in the afternoon/evening due to avoidance of the heat  She does garden now similarly she does it in the morning or late night with and its not too hot and the sons not at peak exposure    She does recall having 1 really bad sunburn when she was in [place] she was laying out in the sun and she was about 20years old  She also think she had a really bad sunburn when she was a kid as well  Denies any tanning bed use  She says that her sister was diagnosed with melanoma approximately 6 or 7 different times  She is an option I will refer  She states her mother had bladder cancer when she was older  She says her dad had muscular dystrophy  She denies a family history of breast, ovarian, and pancreatic cancer  She had brown to reddish-brown hair when she was younger  She has blue eyes  She is [place] in descent  She denies smoking and drug use  She drinks alcohol very rarely  Up-to-date on colonoscopy, mammogram, and Pap smears  REVIEW OF SYSTEMS:  Review of Systems   Constitutional: Negative for appetite change, fatigue, fever and unexpected weight change  HENT:   Negative for lump/mass  Eyes: Negative for icterus  Respiratory: Negative for cough, shortness of breath and wheezing  Cardiovascular: Negative for leg swelling  Gastrointestinal: Negative for abdominal pain, constipation, diarrhea, nausea and vomiting  Genitourinary: Negative for difficulty urinating and hematuria  Musculoskeletal: Negative for arthralgias, gait problem and myalgias  Skin: Negative for itching and rash  No new, changing, or concerning lesions  Healing scar on her left face/cheek  Neurological: Negative for extremity weakness, gait problem, headaches, light-headedness and numbness  Hematological: Negative for adenopathy          MEDICATIONS:    Current Outpatient Medications:   •  albuterol (PROVENTIL HFA,VENTOLIN HFA) 90 mcg/act inhaler, Inhale 1 puff every 4 (four) hours as needed , Disp: , Rfl:   •  cycloSPORINE (RESTASIS) 0 05 % ophthalmic emulsion, Apply 0 05 % to eye 2 (two) times a day, Disp: , Rfl:   •  Digestive Enzymes (DIGESTIVE ENZYME ULTRA PO), Use daily , Disp: , Rfl:   •  erythromycin (ILOTYCIN) ophthalmic ointment, APPLY THIN FILM TO YOUR FACIAL AND EYELID WOUNDS THREE TIMES PER DAY FOR 1 WEEK, Disp: , Rfl:   •  Glucosamine-Chondroitin 750-600 MG TABS, Take by mouth daily , Disp: , Rfl:   •  Homeopathic Products (SIMILASAN DRY EYE RELIEF) SOLN, Apply to eye if needed, Disp: , Rfl:   •  ibuprofen (MOTRIN) 600 mg tablet, Take 600 mg by mouth every 4 (four) hours as needed , Disp: , Rfl:   •  Loratadine 10 MG CAPS, Take 10 mg by mouth as needed , Disp: , Rfl:   •  metoprolol succinate (TOPROL-XL) 25 mg 24 hr tablet, Take 25 mg by mouth Take 1/2 tablet in the morning and half a tablet at night, Disp: , Rfl:   •  Multiple Vitamins-Minerals (MULTIVITAMIN ADULT PO), 2 (two) times a day , Disp: , Rfl:   •  omeprazole (PriLOSEC) 20 mg delayed release capsule, Take 20 mg by mouth daily, Disp: , Rfl:   •  Cholecalciferol (VITAMIN D3) 2000 units capsule, Take 2,000 Units by mouth daily  (Patient not taking: Reported on 5/10/2023), Disp: , Rfl:   •  Vitamin E 400 units TABS, Take by mouth (Patient not taking: Reported on 5/10/2023), Disp: , Rfl:      ALLERGIES:  Allergies   Allergen Reactions   • Bee Venom      Other reaction(s): fatal-hospitalized as child   • Latex    • Mold Extract [Trichophyton]    • Sulfa Antibiotics         ACTIVE PROBLEMS:  Patient Active Problem List   Diagnosis   • Multiple sclerosis ([address] )   • Postconcussion syndrome   • Hypomania ([address] )   • Hyperverbal speech   • Bipolar affective disorder, current episode hypomanic ([address] )   • CURT (obstructive sleep apnea)   • Snoring   • Nocturnal hypoxemia   • Simple febrile convulsions (HCC)   • Neck pain   • Memory loss   • Malignant melanoma of face [facility])          PAST MEDICAL HISTORY:   Past Medical History:   Diagnosis Date   • Asthma    • Hypertension    • Multiple sclerosis ([address] )    • Post concussive syndrome    • Psychiatric disorder         PAST SURGICAL HISTORY:  Past Surgical History:   Procedure Laterality Date   • CATARACT EXTRACTION Left 12/27/2022 SOCIAL HISTORY:  Social History     Socioeconomic History   • Marital status: Single     Spouse name: None   • Number of children: None   • Years of education: None   • Highest education level: None   Occupational History   • None   Tobacco Use   • Smoking status: Never   • Smokeless tobacco: Never   Vaping Use   • Vaping Use: Never used   Substance and Sexual Activity   • Alcohol use: Yes     Comment: very infrequent   • Drug use: No   • Sexual activity: None   Other Topics Concern   • None   Social History Narrative   • None     Social Determinants of Health     Financial Resource Strain: Not on file   Food Insecurity: Not on file   Transportation Needs: Not on file   Physical Activity: Not on file   Stress: Not on file   Social Connections: Not on file   Intimate Partner Violence: Not on file   Housing Stability: Not on file        FAMILY HISTORY:  Family History   Problem Relation Age of Onset   • Heart failure Mother    • Pneumonia Mother    • Heart failure Father    • Muscular dystrophy Father    • Pneumonia Father            Objective    PHYSICAL EXAMINATION:   Blood pressure 166/80, pulse 66, temperature (!) 97 3 °F (36 3 °C), temperature source Temporal, resp  rate 16, height 5' (1 524 m), weight 78 kg (172 lb), SpO2 96 %  Pain Score: 0-No pain       ECOG Performance Status      Flowsheet Row Most Recent Value   ECOG Performance Status 0 - Fully active, able to carry on all pre-disease performance without restriction               Physical Exam  Constitutional:       General: She is not in acute distress  Appearance: Normal appearance  She is not toxic-appearing  HENT:      Mouth/Throat:      Mouth: Mucous membranes are moist       Pharynx: Oropharynx is clear  Eyes:      General: No scleral icterus  Conjunctiva/sclera: Conjunctivae normal    Cardiovascular:      Rate and Rhythm: Normal rate and regular rhythm  Pulses: Normal pulses  Heart sounds: No murmur heard       No friction rub  No gallop  Pulmonary:      Effort: Pulmonary effort is normal  No respiratory distress  Breath sounds: Normal breath sounds  No wheezing or rales  Abdominal:      General: There is no distension  Palpations: There is no mass  Tenderness: There is no abdominal tenderness  There is no rebound  Musculoskeletal:         General: No swelling or tenderness  Cervical back: Normal range of motion  No rigidity  Right lower leg: No edema  Left lower leg: No edema  Lymphadenopathy:      Head:      Right side of head: No submandibular, preauricular or posterior auricular adenopathy  Left side of head: No submandibular, preauricular or posterior auricular adenopathy  Cervical: No cervical adenopathy  Right cervical: No superficial or posterior cervical adenopathy  Left cervical: No superficial or posterior cervical adenopathy  Upper Body:      Right upper body: No supraclavicular or axillary adenopathy  Left upper body: No supraclavicular or axillary adenopathy  Lower Body: No right inguinal adenopathy  No left inguinal adenopathy  Skin:     Findings: No rash  Comments: Well healed surgical scar  No evidence of recurrence at primary site  Neurological:      General: No focal deficit present  Mental Status: She is alert and oriented to person, place, and time  Psychiatric:         Mood and Affect: Mood normal          Behavior: Behavior normal          Thought Content: Thought content normal          Judgment: Judgment normal          I reviewed lab data in the chart      Most recent labs in [facility] and reviewed    Hemoglobin   Date Value Ref Range Status   08/10/2019 13 3 11 5 - 15 4 g/dL Final   01/14/2019 13 0 11 5 - 15 4 g/dL Final   01/28/2017 14 0 11 5 - 15 4 g/dL Final   10/31/2015 14 2 11 5 - 15 4 g/dL Final   09/11/2015 13 8 11 5 - 15 4 g/dL Final   06/24/2015 13 4 11 5 - 15 4 g/dL Final     MCV   Date Value Ref Range Status   08/10/2019 98 82 - 98 fL Final   01/14/2019 98 82 - 98 fL Final   01/28/2017 96 82 - 98 fL Final   10/31/2015 94 82 - 98 fL Final   09/11/2015 95 82 - 98 fL Final   06/24/2015 97 82 - 98 fL Final     Platelets   Date Value Ref Range Status   08/10/2019 213 149 - 390 Thousands/uL Final   01/14/2019 207 149 - 390 Thousands/uL Final   01/28/2017 237 149 - 390 Thousands/uL Final   10/31/2015 212 149 - 390 Thousand/uL Final   09/11/2015 214 149 - 390 Thousand/uL Final   06/24/2015 204 149 - 390 Thousand/uL Final     WBC   Date Value Ref Range Status   08/10/2019 4 69 4 31 - 10 16 Thousand/uL Final   01/14/2019 5 83 4 31 - 10 16 Thousand/uL Final   01/28/2017 4 67 4 31 - 10 16 Thousand/uL Final   10/31/2015 4 00 (L) 4 31 - 10 16 Thousand/uL Final   09/11/2015 4 83 4 31 - 10 16 Thousand/uL Final   06/24/2015 5 54 4 31 - 10 16 Thousand/uL Final      Albumin   Date Value Ref Range Status   08/10/2019 3 7 3 5 - 5 0 g/dL Final   04/15/2019 3 8 3 5 - 5 0 g/dL Final   01/14/2019 4 0 3 5 - 5 0 g/dL Final   10/31/2015 3 8 3 5 - 5 0 g/dL Final   09/11/2015 4 2 3 5 - 5 0 g/dL Final   11/17/2014 4 2 3 5 - 5 0 g/dL Final     Alkaline Phosphatase   Date Value Ref Range Status   08/10/2019 67 46 - 116 U/L Final   04/15/2019 75 46 - 116 U/L Final   01/14/2019 64 46 - 116 U/L Final   10/31/2015 76 46 - 116 U/L Final   09/11/2015 73 46 - 116 U/L Final   11/17/2014 96 50 - 136 U/L Final     ALT   Date Value Ref Range Status   08/10/2019 63 12 - 78 U/L Final     Comment:       Specimen collection should occur prior to Sulfasalazine and/or Sulfapyridine administration due to the potential for falsely depressed results  04/15/2019 65 12 - 78 U/L Final     Comment:       Specimen collection should occur prior to Sulfasalazine administration due to the potential for falsely depressed results      01/14/2019 26 12 - 78 U/L Final     Comment:       Specimen collection should occur prior to Sulfasalazine and/or Sulfapyridine administration due to the potential for falsely depressed results  10/31/2015 41 12 - 78 U/L Final   09/11/2015 32 12 - 78 U/L Final   11/17/2014 25 6 - 78 U/L Final     Comment:     New Reference Range     AST   Date Value Ref Range Status   08/10/2019 36 5 - 45 U/L Final     Comment:       Specimen collection should occur prior to Sulfasalazine administration due to the potential for falsely depressed results  04/15/2019 26 5 - 45 U/L Final     Comment:       Specimen collection should occur prior to Sulfasalazine administration due to the potential for falsely depressed results  01/14/2019 18 5 - 45 U/L Final     Comment:       Specimen collection should occur prior to Sulfasalazine administration due to the potential for falsely depressed results      10/31/2015 19 5 - 45 U/L Final   09/11/2015 20 5 - 45 U/L Final   11/17/2014 19 7 - 40 U/L Final     BUN   Date Value Ref Range Status   02/08/2021 15 5 - 25 mg/dL Final   03/09/2020 19 5 - 25 mg/dL Final   08/10/2019 16 5 - 25 mg/dL Final   10/31/2015 16 5 - 25 mg/dL Final   09/11/2015 20 5 - 25 mg/dL Final   11/17/2014 14 10 - 26 mg/dL Final     Calcium   Date Value Ref Range Status   03/09/2020 9 5 8 3 - 10 1 mg/dL Final   08/10/2019 9 2 8 3 - 10 1 mg/dL Final   04/15/2019 9 7 8 3 - 10 1 mg/dL Final   10/31/2015 8 9 8 3 - 10 1 mg/dL Final   09/11/2015 9 2 8 3 - 10 1 mg/dL Final   11/17/2014 9 4 8 3 - 10 1 mg/dL Final     Chloride   Date Value Ref Range Status   03/09/2020 106 100 - 108 mmol/L Final   08/10/2019 105 100 - 108 mmol/L Final   04/15/2019 103 100 - 108 mmol/L Final   10/31/2015 106 100 - 108 mmol/L Final   09/11/2015 103 98 - 108 mmol/L Final   11/17/2014 103 100 - 108 mmol/L Final     CO2   Date Value Ref Range Status   03/09/2020 29 21 - 32 mmol/L Final   08/10/2019 29 21 - 32 mmol/L Final   04/15/2019 29 21 - 32 mmol/L Final   10/31/2015 29 21 0 - 32 0 mmol/L Final   09/11/2015 31 2 21 0 - 32 0 mmol/L Final   11/17/2014 31 25 - 33 mmol/L Final     Creatinine   Date Value Ref Range Status   02/08/2021 0 81 0 60 - 1 30 mg/dL Final     Comment:     Standardized to IDMS reference method   03/09/2020 0 84 0 60 - 1 30 mg/dL Final     Comment:     Standardized to IDMS reference method   08/10/2019 0 77 0 60 - 1 30 mg/dL Final     Comment:     Standardized to IDMS reference method   10/31/2015 0 70 0 60 - 1 30 mg/dL Final     Comment:     Standardized to IDMS reference method   09/11/2015 0 68 0 60 - 1 30 mg/dL Final     Comment:     Standardized to IDMS reference method   11/17/2014 0 55 (L) 0 60 - 1 30 mg/dL Final     Comment:     Standardized to IDMS reference method     Glucose   Date Value Ref Range Status   04/15/2019 115 65 - 140 mg/dL Final     Comment:       If the patient is fasting, the ADA then defines impaired fasting glucose as > 100 mg/dL and diabetes as > or equal to 123 mg/dL  Specimen collection should occur prior to Sulfasalazine administration due to the potential for falsely depressed results  Specimen collection should occur prior to Sulfapyridine administration due to the potential for falsely elevated results  01/28/2017 104 65 - 140 mg/dL Final     Comment:     If the patient is fasting, the ADA then defines impaired fasting glucose as > 100 mg/dL and diabetes as > or equal to 123 mg/dL  08/06/2016 95 65 - 140 mg/dL Final     Comment:     If the patient is fasting, the ADA then defines impaired fasting glucose as > 100 mg/dL and diabetes as > or equal to 123 mg/dL       Potassium   Date Value Ref Range Status   03/09/2020 3 9 3 5 - 5 3 mmol/L Final   08/10/2019 4 1 3 5 - 5 3 mmol/L Final   04/15/2019 4 5 3 5 - 5 3 mmol/L Final   10/31/2015 4 8 3 5 - 5 3 mmol/L Final   09/11/2015 3 7 3 5 - 5 3 mmol/L Final   11/17/2014 4 5 3 5 - 5 0 mmol/L Final     Sodium   Date Value Ref Range Status   10/31/2015 142 136 - 145 mmol/L Final   09/11/2015 141 136 - 145 mmol/L Final   11/17/2014 139 135 - 145 mmol/L Final     Total Protein   Date "Value Ref Range Status   10/31/2015 7 1 6 4 - 8 2 g/dL Final   09/11/2015 7 8 6 4 - 8 2 g/dL Final   11/17/2014 7 2 6 4 - 8 2 g/dL Final     Total Bilirubin   Date Value Ref Range Status   08/10/2019 0 83 0 20 - 1 00 mg/dL Final   04/15/2019 0 40 0 20 - 1 00 mg/dL Final   01/14/2019 0 86 0 20 - 1 00 mg/dL Final      No results found for: \"LDH\"  No results found for: \"TSH\"  No results found for: \"N5TVGXI\"   No results found for: \"FREET4\"      RECENT IMAGING:  No results found  Assessment/Plan  Ms Vale Grajeda is a 77 yr female with new diagnosis of stage Ia melanoma here for disease management discussion  1  Malignant melanoma of face ([address] )  I had an extensive discussion with the patient regarding her  diagnosis, prognosis, and recommendations for further management  We reviewed her melanoma history, current findings, pathology and imaging tests  We discussed that she has stage Ia melanoma  We discussed that she had definitive surgical excision  We discussed that she has a low risk of recurrence, but we will continue to monitor for recurrence  We discussed that she is more at risk for development of a new primary melanoma given her diagnosis at this time  We discussed that she will require close monitoring and follow-up including dermatology follow-up  We will make sure she has follow-up appointment scheduled dermatology  We discussed that she will require ongoing monitoring and surveillance, both for disease recurrence as well as a new primary melanoma as well as other nonmelanoma skin cancers  This includes physical exam and labs, including a comprehensive metabolic panel, CBC with differential and LDH  She had baseline labs already, and I have ordered for repeat blood work in 6 months at her return visit  Was placed and prescription provided  We discussed the importance of regular cutaneous self examinations and reviewed the features of lesions that could be concerning for skin cancer   I " did explain that she  is at risk for developing another primary melanoma as well  We also discussed avoidance of unnecessary sun exposure and use of sun protective clothing and sunscreen  I also recommended routine follow up and skin checks with dermatology  Family Screening: her  first-degree relatives, namely his siblings, parents, and children, have an increased risk of developing a melanoma over the general population given  her  diagnosis of melanoma, and should have annual dermatologic screening  She will return in 6 months for follow-up  She knows to call with issues or concerns prior to her next visit  - Ambulatory Referral to Hematology / Oncology  - CBC and differential; Future  - Comprehensive metabolic panel; Future  - LD,Blood; Future    2  Multiple sclerosis (HCC)  Stable  Continue follow-up as planned  If we should ever need to treat her for melanoma, we would need to take this diagnosis into consideration with our different treatment options  At this time, she only requires surveillance for her melanoma  Return in about 6 months (around 12/8/2023) for Office Visit, labs  Ms Ana Machado had all her questions and concerns addressed and she knows to call with any additional issues  Thank you for allowing me to participate in Ms Thompson's care        Nile Bee MD, PhD

## 2023-06-09 NOTE — PROGRESS NOTES
[facility] HEMATOLOGY ONCOLOGY SPECIALISTS [address]  [facility] [address]  [phone]  [phone]     Date of Visit: 6/8/2023  Name: Danielle Chan   YOB: 1955        Subjective    VISIT DIAGNOSIS:  Diagnoses and all orders for this visit:    Malignant melanoma of face [facility])  -     Ambulatory Referral to Hematology / Oncology  -     CBC and differential; Future  -     Comprehensive metabolic panel; Future  -     LD,Blood; Future    Multiple sclerosis [facility])        Oncology History   Malignant melanoma of face ([facility] [address] )   2/20/2023 -  Cancer Staged    Staging form: Melanoma of the Skin, AJCC 8th Edition  - Clinical stage from 2/20/2023: Stage IA (cT1a, cN0, cM0) - Signed by Erica Brenner MD on 6/8/2023 6/8/2023 Initial Diagnosis    Malignant melanoma of face [facility])        Cancer Staging   Malignant melanoma of face [facility])  Staging form: Melanoma of the Skin, AJCC 8th Edition  - Clinical stage from 2/20/2023: Stage IA (cT1a, cN0, cM0) - Signed by Erica Brenner MD on 6/8/2023          HISTORY OF PRESENT ILLNESS: Danielle Chan is a 77 y o  female  who is seen in consultation for new diagnosis of stage Ia melanoma at the request of dermatology  History is obtained from the patient and review of records  She states that she had a spot like a birthmark on the left corner of her eye just below her lower eyelid and partially on her cheek that had been there for 10 to 15 years  It was plaque-like pigmented lesion but was smooth and continuous with her skin  It was not raised or lumpy or bumpy  She thinks may be got a little bit darker over time  It did not bother her  It was not painful, not itching, and not bleeding  Her general practitioner had mentioned to her that she should probably have it looked at  She saw dermatology who biopsied the lesion on 2/20/2023    Pathology demonstrates melanoma, predominantly in situ with scattered foci consistent with invasion with a thickness of 0 4 mm  No ulceration  Randy level II with invasive melanoma focally extending to deep specimen in the thin specimen  Lentigo maligna melanoma histology  No mitoses  No microsatellites, lymphovascular invasion, neurotropism, or regression  MIS extends to the peripheral specimen and invasive melanoma focally extends to the focally deep specimens  He underwent Mohs procedure with Dr Manuel Candelaria at [facility] with Dr Bragg Massed reconstruction given close proximity to her lower eyelid  Doing well and her scar is healing very nicely  Has twinges of pain every once in a while but otherwise no issues concerns or complaints with her healing scar  Denies any new, changing, concerning skin lesions at this time  No lymphadenopathy  She describes having not too much sun exposure overall as she does not like the heat  She actually cannot be out in the sun too long with the heat given her MS  She was an avid cycler when she was about 20 or 28 when she was in [place], however she would cycle early in the morning or late in the afternoon/evening due to avoidance of the heat  She does garden now similarly she does it in the morning or late night with and its not too hot and the sons not at peak exposure  She does recall having 1 really bad sunburn when she was in [place] she was laying out in the sun and she was about 20years old  She also think she had a really bad sunburn when she was a kid as well  Denies any tanning bed use  She says that her sister was diagnosed with melanoma approximately 6 or 7 different times  She is an option I will refer  She states her mother had bladder cancer when she was older  She says her dad had muscular dystrophy  She denies a family history of breast, ovarian, and pancreatic cancer  She had brown to reddish-brown hair when she was younger  She has blue eyes    She is [place] in descent  She denies smoking and drug use  She drinks alcohol very rarely  Up-to-date on colonoscopy, mammogram, and Pap smears  REVIEW OF SYSTEMS:  Review of Systems   Constitutional: Negative for appetite change, fatigue, fever and unexpected weight change  HENT:   Negative for lump/mass  Eyes: Negative for icterus  Respiratory: Negative for cough, shortness of breath and wheezing  Cardiovascular: Negative for leg swelling  Gastrointestinal: Negative for abdominal pain, constipation, diarrhea, nausea and vomiting  Genitourinary: Negative for difficulty urinating and hematuria  Musculoskeletal: Negative for arthralgias, gait problem and myalgias  Skin: Negative for itching and rash  No new, changing, or concerning lesions  Healing scar on her left face/cheek  Neurological: Negative for extremity weakness, gait problem, headaches, light-headedness and numbness  Hematological: Negative for adenopathy          MEDICATIONS:    Current Outpatient Medications:   •  albuterol (PROVENTIL HFA,VENTOLIN HFA) 90 mcg/act inhaler, Inhale 1 puff every 4 (four) hours as needed , Disp: , Rfl:   •  cycloSPORINE (RESTASIS) 0 05 % ophthalmic emulsion, Apply 0 05 % to eye 2 (two) times a day, Disp: , Rfl:   •  Digestive Enzymes (DIGESTIVE ENZYME ULTRA PO), Use daily , Disp: , Rfl:   •  erythromycin (ILOTYCIN) ophthalmic ointment, APPLY THIN FILM TO YOUR FACIAL AND EYELID WOUNDS THREE TIMES PER DAY FOR 1 WEEK, Disp: , Rfl:   •  Glucosamine-Chondroitin 750-600 MG TABS, Take by mouth daily , Disp: , Rfl:   •  Homeopathic Products (SIMILASAN DRY EYE RELIEF) SOLN, Apply to eye if needed, Disp: , Rfl:   •  ibuprofen (MOTRIN) 600 mg tablet, Take 600 mg by mouth every 4 (four) hours as needed , Disp: , Rfl:   •  Loratadine 10 MG CAPS, Take 10 mg by mouth as needed , Disp: , Rfl:   •  metoprolol succinate (TOPROL-XL) 25 mg 24 hr tablet, Take 25 mg by mouth Take 1/2 tablet in the morning and half a tablet at night, Disp: , Rfl:   •  Multiple Vitamins-Minerals (MULTIVITAMIN ADULT PO), 2 (two) times a day , Disp: , Rfl:   •  omeprazole (PriLOSEC) 20 mg delayed release capsule, Take 20 mg by mouth daily, Disp: , Rfl:   •  Cholecalciferol (VITAMIN D3) 2000 units capsule, Take 2,000 Units by mouth daily  (Patient not taking: Reported on 5/10/2023), Disp: , Rfl:   •  Vitamin E 400 units TABS, Take by mouth (Patient not taking: Reported on 5/10/2023), Disp: , Rfl:      ALLERGIES:  Allergies   Allergen Reactions   • Bee Venom      Other reaction(s): fatal-hospitalized as child   • Latex    • Mold Extract [Trichophyton]    • Sulfa Antibiotics         ACTIVE PROBLEMS:  Patient Active Problem List   Diagnosis   • Multiple sclerosis ([address] )   • Postconcussion syndrome   • Hypomania ([address] )   • Hyperverbal speech   • Bipolar affective disorder, current episode hypomanic ([facility] [address] )   • CURT (obstructive sleep apnea)   • Snoring   • Nocturnal hypoxemia   • Simple febrile convulsions (HCC)   • Neck pain   • Memory loss   • Malignant melanoma of face ([address] )          PAST MEDICAL HISTORY:   Past Medical History:   Diagnosis Date   • Asthma    • Hypertension    • Multiple sclerosis ([address] )    • Post concussive syndrome    • Psychiatric disorder         PAST SURGICAL HISTORY:  Past Surgical History:   Procedure Laterality Date   • CATARACT EXTRACTION Left 12/27/2022        SOCIAL HISTORY:  Social History     Socioeconomic History   • Marital status: Single     Spouse name: None   • Number of children: None   • Years of education: None   • Highest education level: None   Occupational History   • None   Tobacco Use   • Smoking status: Never   • Smokeless tobacco: Never   Vaping Use   • Vaping Use: Never used   Substance and Sexual Activity   • Alcohol use: Yes     Comment: very infrequent   • Drug use: No   • Sexual activity: None   Other Topics Concern   • None   Social History Narrative   • None     Social Determinants of Health     Financial Resource Strain: Not on file   Food Insecurity: Not on file   Transportation Needs: Not on file   Physical Activity: Not on file   Stress: Not on file   Social Connections: Not on file   Intimate Partner Violence: Not on file   Housing Stability: Not on file        FAMILY HISTORY:  Family History   Problem Relation Age of Onset   • Heart failure Mother    • Pneumonia Mother    • Heart failure Father    • Muscular dystrophy Father    • Pneumonia Father            Objective    PHYSICAL EXAMINATION:   Blood pressure 166/80, pulse 66, temperature (!) 97 3 °F (36 3 °C), temperature source Temporal, resp  rate 16, height 5' (1 524 m), weight 78 kg (172 lb), SpO2 96 %  Pain Score: 0-No pain       ECOG Performance Status    Flowsheet Row Most Recent Value   ECOG Performance Status 0 - Fully active, able to carry on all pre-disease performance without restriction             Physical Exam  Constitutional:       General: She is not in acute distress  Appearance: Normal appearance  She is not toxic-appearing  HENT:      Mouth/Throat:      Mouth: Mucous membranes are moist       Pharynx: Oropharynx is clear  Eyes:      General: No scleral icterus  Conjunctiva/sclera: Conjunctivae normal    Cardiovascular:      Rate and Rhythm: Normal rate and regular rhythm  Pulses: Normal pulses  Heart sounds: No murmur heard  No friction rub  No gallop  Pulmonary:      Effort: Pulmonary effort is normal  No respiratory distress  Breath sounds: Normal breath sounds  No wheezing or rales  Abdominal:      General: There is no distension  Palpations: There is no mass  Tenderness: There is no abdominal tenderness  There is no rebound  Musculoskeletal:         General: No swelling or tenderness  Cervical back: Normal range of motion  No rigidity  Right lower leg: No edema  Left lower leg: No edema     Lymphadenopathy:      Head:      Right side of head: No submandibular, preauricular or posterior auricular adenopathy  Left side of head: No submandibular, preauricular or posterior auricular adenopathy  Cervical: No cervical adenopathy  Right cervical: No superficial or posterior cervical adenopathy  Left cervical: No superficial or posterior cervical adenopathy  Upper Body:      Right upper body: No supraclavicular or axillary adenopathy  Left upper body: No supraclavicular or axillary adenopathy  Lower Body: No right inguinal adenopathy  No left inguinal adenopathy  Skin:     Findings: No rash  Comments: Well healed surgical scar  No evidence of recurrence at primary site  Neurological:      General: No focal deficit present  Mental Status: She is alert and oriented to person, place, and time  Psychiatric:         Mood and Affect: Mood normal          Behavior: Behavior normal          Thought Content: Thought content normal          Judgment: Judgment normal          I reviewed lab data in the chart      Most recent labs in [facility] and reviewed    Hemoglobin   Date Value Ref Range Status   08/10/2019 13 3 11 5 - 15 4 g/dL Final   01/14/2019 13 0 11 5 - 15 4 g/dL Final   01/28/2017 14 0 11 5 - 15 4 g/dL Final   10/31/2015 14 2 11 5 - 15 4 g/dL Final   09/11/2015 13 8 11 5 - 15 4 g/dL Final   06/24/2015 13 4 11 5 - 15 4 g/dL Final     MCV   Date Value Ref Range Status   08/10/2019 98 82 - 98 fL Final   01/14/2019 98 82 - 98 fL Final   01/28/2017 96 82 - 98 fL Final   10/31/2015 94 82 - 98 fL Final   09/11/2015 95 82 - 98 fL Final   06/24/2015 97 82 - 98 fL Final     Platelets   Date Value Ref Range Status   08/10/2019 213 149 - 390 Thousands/uL Final   01/14/2019 207 149 - 390 Thousands/uL Final   01/28/2017 237 149 - 390 Thousands/uL Final   10/31/2015 212 149 - 390 Thousand/uL Final   09/11/2015 214 149 - 390 Thousand/uL Final   06/24/2015 204 149 - 390 Thousand/uL Final     WBC   Date Value Ref Range Status   08/10/2019 4 69 4 31 - 10 16 Thousand/uL Final   01/14/2019 5 83 4 31 - 10 16 Thousand/uL Final   01/28/2017 4 67 4 31 - 10 16 Thousand/uL Final   10/31/2015 4 00 (L) 4 31 - 10 16 Thousand/uL Final   09/11/2015 4 83 4 31 - 10 16 Thousand/uL Final   06/24/2015 5 54 4 31 - 10 16 Thousand/uL Final      Albumin   Date Value Ref Range Status   08/10/2019 3 7 3 5 - 5 0 g/dL Final   04/15/2019 3 8 3 5 - 5 0 g/dL Final   01/14/2019 4 0 3 5 - 5 0 g/dL Final   10/31/2015 3 8 3 5 - 5 0 g/dL Final   09/11/2015 4 2 3 5 - 5 0 g/dL Final   11/17/2014 4 2 3 5 - 5 0 g/dL Final     Alkaline Phosphatase   Date Value Ref Range Status   08/10/2019 67 46 - 116 U/L Final   04/15/2019 75 46 - 116 U/L Final   01/14/2019 64 46 - 116 U/L Final   10/31/2015 76 46 - 116 U/L Final   09/11/2015 73 46 - 116 U/L Final   11/17/2014 96 50 - 136 U/L Final     ALT   Date Value Ref Range Status   08/10/2019 63 12 - 78 U/L Final     Comment:       Specimen collection should occur prior to Sulfasalazine and/or Sulfapyridine administration due to the potential for falsely depressed results  04/15/2019 65 12 - 78 U/L Final     Comment:       Specimen collection should occur prior to Sulfasalazine administration due to the potential for falsely depressed results  01/14/2019 26 12 - 78 U/L Final     Comment:       Specimen collection should occur prior to Sulfasalazine and/or Sulfapyridine administration due to the potential for falsely depressed results  10/31/2015 41 12 - 78 U/L Final   09/11/2015 32 12 - 78 U/L Final   11/17/2014 25 6 - 78 U/L Final     Comment:     New Reference Range     AST   Date Value Ref Range Status   08/10/2019 36 5 - 45 U/L Final     Comment:       Specimen collection should occur prior to Sulfasalazine administration due to the potential for falsely depressed results  04/15/2019 26 5 - 45 U/L Final     Comment:       Specimen collection should occur prior to Sulfasalazine administration due to the potential for falsely depressed results  01/14/2019 18 5 - 45 U/L Final     Comment:       Specimen collection should occur prior to Sulfasalazine administration due to the potential for falsely depressed results      10/31/2015 19 5 - 45 U/L Final   09/11/2015 20 5 - 45 U/L Final   11/17/2014 19 7 - 40 U/L Final     BUN   Date Value Ref Range Status   02/08/2021 15 5 - 25 mg/dL Final   03/09/2020 19 5 - 25 mg/dL Final   08/10/2019 16 5 - 25 mg/dL Final   10/31/2015 16 5 - 25 mg/dL Final   09/11/2015 20 5 - 25 mg/dL Final   11/17/2014 14 10 - 26 mg/dL Final     Calcium   Date Value Ref Range Status   03/09/2020 9 5 8 3 - 10 1 mg/dL Final   08/10/2019 9 2 8 3 - 10 1 mg/dL Final   04/15/2019 9 7 8 3 - 10 1 mg/dL Final   10/31/2015 8 9 8 3 - 10 1 mg/dL Final   09/11/2015 9 2 8 3 - 10 1 mg/dL Final   11/17/2014 9 4 8 3 - 10 1 mg/dL Final     Chloride   Date Value Ref Range Status   03/09/2020 106 100 - 108 mmol/L Final   08/10/2019 105 100 - 108 mmol/L Final   04/15/2019 103 100 - 108 mmol/L Final   10/31/2015 106 100 - 108 mmol/L Final   09/11/2015 103 98 - 108 mmol/L Final   11/17/2014 103 100 - 108 mmol/L Final     CO2   Date Value Ref Range Status   03/09/2020 29 21 - 32 mmol/L Final   08/10/2019 29 21 - 32 mmol/L Final   04/15/2019 29 21 - 32 mmol/L Final   10/31/2015 29 21 0 - 32 0 mmol/L Final   09/11/2015 31 2 21 0 - 32 0 mmol/L Final   11/17/2014 31 25 - 33 mmol/L Final     Creatinine   Date Value Ref Range Status   02/08/2021 0 81 0 60 - 1 30 mg/dL Final     Comment:     Standardized to IDMS reference method   03/09/2020 0 84 0 60 - 1 30 mg/dL Final     Comment:     Standardized to IDMS reference method   08/10/2019 0 77 0 60 - 1 30 mg/dL Final     Comment:     Standardized to IDMS reference method   10/31/2015 0 70 0 60 - 1 30 mg/dL Final     Comment:     Standardized to IDMS reference method   09/11/2015 0 68 0 60 - 1 30 mg/dL Final     Comment:     Standardized to IDMS reference method   11/17/2014 0 55 (L) 0 60 - 1 30 mg/dL Final     Comment: " Standardized to IDMS reference method     Glucose   Date Value Ref Range Status   04/15/2019 115 65 - 140 mg/dL Final     Comment:       If the patient is fasting, the ADA then defines impaired fasting glucose as > 100 mg/dL and diabetes as > or equal to 123 mg/dL  Specimen collection should occur prior to Sulfasalazine administration due to the potential for falsely depressed results  Specimen collection should occur prior to Sulfapyridine administration due to the potential for falsely elevated results  01/28/2017 104 65 - 140 mg/dL Final     Comment:     If the patient is fasting, the ADA then defines impaired fasting glucose as > 100 mg/dL and diabetes as > or equal to 123 mg/dL  08/06/2016 95 65 - 140 mg/dL Final     Comment:     If the patient is fasting, the ADA then defines impaired fasting glucose as > 100 mg/dL and diabetes as > or equal to 123 mg/dL  Potassium   Date Value Ref Range Status   03/09/2020 3 9 3 5 - 5 3 mmol/L Final   08/10/2019 4 1 3 5 - 5 3 mmol/L Final   04/15/2019 4 5 3 5 - 5 3 mmol/L Final   10/31/2015 4 8 3 5 - 5 3 mmol/L Final   09/11/2015 3 7 3 5 - 5 3 mmol/L Final   11/17/2014 4 5 3 5 - 5 0 mmol/L Final     Sodium   Date Value Ref Range Status   10/31/2015 142 136 - 145 mmol/L Final   09/11/2015 141 136 - 145 mmol/L Final   11/17/2014 139 135 - 145 mmol/L Final     Total Protein   Date Value Ref Range Status   10/31/2015 7 1 6 4 - 8 2 g/dL Final   09/11/2015 7 8 6 4 - 8 2 g/dL Final   11/17/2014 7 2 6 4 - 8 2 g/dL Final     Total Bilirubin   Date Value Ref Range Status   08/10/2019 0 83 0 20 - 1 00 mg/dL Final   04/15/2019 0 40 0 20 - 1 00 mg/dL Final   01/14/2019 0 86 0 20 - 1 00 mg/dL Final      No results found for: \"LDH\"  No results found for: \"TSH\"  No results found for: \"D9YKPSD\"   No results found for: \"FREET4\"      RECENT IMAGING:  No results found  Assessment/Plan  Ms Roxie Gallego is a 77 yr female with new diagnosis of stage Ia melanoma here for disease " management discussion  1  Malignant melanoma of face ([facility] [address] )  I had an extensive discussion with the patient regarding her  diagnosis, prognosis, and recommendations for further management  We reviewed her melanoma history, current findings, pathology and imaging tests  We discussed that she has stage Ia melanoma  We discussed that she had definitive surgical excision  We discussed that she has a low risk of recurrence, but we will continue to monitor for recurrence  We discussed that she is more at risk for development of a new primary melanoma given her diagnosis at this time  We discussed that she will require close monitoring and follow-up including dermatology follow-up  We will make sure she has follow-up appointment scheduled dermatology  We discussed that she will require ongoing monitoring and surveillance, both for disease recurrence as well as a new primary melanoma as well as other nonmelanoma skin cancers  This includes physical exam and labs, including a comprehensive metabolic panel, CBC with differential and LDH  She had baseline labs already, and I have ordered for repeat blood work in 6 months at her return visit  Was placed and prescription provided  We discussed the importance of regular cutaneous self examinations and reviewed the features of lesions that could be concerning for skin cancer  I did explain that she  is at risk for developing another primary melanoma as well  We also discussed avoidance of unnecessary sun exposure and use of sun protective clothing and sunscreen  I also recommended routine follow up and skin checks with dermatology  Family Screening: her  first-degree relatives, namely his siblings, parents, and children, have an increased risk of developing a melanoma over the general population given  her  diagnosis of melanoma, and should have annual dermatologic screening  She will return in 6 months for follow-up    She knows to call with issues or concerns prior to her next visit  - Ambulatory Referral to Hematology / Oncology  - CBC and differential; Future  - Comprehensive metabolic panel; Future  - LD,Blood; Future    2  Multiple sclerosis (HCC)  Stable  Continue follow-up as planned  If we should ever need to treat her for melanoma, we would need to take this diagnosis into consideration with our different treatment options  At this time, she only requires surveillance for her melanoma  Return in about 6 months (around 12/8/2023) for Office Visit, labs  Ms Doretha Chavez had all her questions and concerns addressed and she knows to call with any additional issues  Thank you for allowing me to participate in Ms Thompson's care        Shazia Banks MD, PhD

## 2023-07-05 ENCOUNTER — OFFICE VISIT (OUTPATIENT)
Dept: DERMATOLOGY | Facility: CLINIC | Age: 68
End: 2023-07-05
Payer: COMMERCIAL

## 2023-07-05 VITALS — TEMPERATURE: 98.3 F | HEIGHT: 60 IN | WEIGHT: 171 LBS | BODY MASS INDEX: 33.57 KG/M2

## 2023-07-05 DIAGNOSIS — D22.70 MULTIPLE BENIGN NEVI OF UPPER EXTREMITY, LOWER EXTREMITY, AND TRUNK: ICD-10-CM

## 2023-07-05 DIAGNOSIS — L82.1 SEBORRHEIC KERATOSIS: Primary | ICD-10-CM

## 2023-07-05 DIAGNOSIS — L81.4 LENTIGINES: ICD-10-CM

## 2023-07-05 DIAGNOSIS — D18.01 CHERRY ANGIOMA: ICD-10-CM

## 2023-07-05 DIAGNOSIS — Z85.820 HISTORY OF MELANOMA: ICD-10-CM

## 2023-07-05 DIAGNOSIS — D22.5 MULTIPLE BENIGN NEVI OF UPPER EXTREMITY, LOWER EXTREMITY, AND TRUNK: ICD-10-CM

## 2023-07-05 DIAGNOSIS — D23.9 DERMATOFIBROMA: ICD-10-CM

## 2023-07-05 DIAGNOSIS — D22.60 MULTIPLE BENIGN NEVI OF UPPER EXTREMITY, LOWER EXTREMITY, AND TRUNK: ICD-10-CM

## 2023-07-05 PROCEDURE — 99213 OFFICE O/P EST LOW 20 MIN: CPT | Performed by: STUDENT IN AN ORGANIZED HEALTH CARE EDUCATION/TRAINING PROGRAM

## 2023-07-05 RX ORDER — LANSOPRAZOLE 30 MG/1
30 CAPSULE, DELAYED RELEASE ORAL DAILY
COMMUNITY

## 2023-07-05 NOTE — PATIENT INSTRUCTIONS
What is skin cancer? Skin cancer is unfortunately very common. That's why we are here to help you on your journey to healthy happy skin! There are two main types of skin cancer: melanoma and non-melanoma skin cancer. Melanoma is a form of skin cancer that often arises within an existing nevus or mole. However, this is not always the case. Melanoma can arise anywhere (not only where you have moles right now). Melanoma can run in families, so letting us know about your family history is important. Non-melanoma skin cancer is the most common type of cancer in the [facility]. The two main types of non-melanoma skin cancers are basal cell carcinomas (BCC) and squamous cell carcinoma (SCC). These cancers tend to be less aggressive than melanomas but are still important to look for and treat. What can I do to prevent skin cancer? One of the largest risk factors for skin cancer is sun exposure or UV radiation. Therefore, sun protection is silva! Here are some great tips for protecting yourself! Try to avoid direct sun exposure during peak sun hours (10 AM to 2 PM)  Remember you get A LOT of sun even under cloud coverage and through care windows! When choosing a sunscreen, look for one that says “broad spectrum” sunscreen. This means it protects you from more of the harmful UV rays. Choose a sunscreen that is SPF 30 or greater for best protection. Apply sunscreen to all sun-exposed skin and reapply every 2 hours. Consider sun protective clothing! Great additions to your sun protective clothing wardrobe include broad brimmed hats, sunglasses, UPF clothing. Avoid tanning salons. These have been shown to be very harmful in terms of your risk of skin cancer. Avoid “base tans”. We now know that tans are dangerous (not just sun burns). If you want to have a tan for a trip, consider a spray tan! Should I check my skin at home between my dermatology appointments? Yes!  It's always a great idea to look at your skin on a regular basis. Here are some things to look for when monitoring your skin. For melanoma, look for the ABCDE's! A = Asymmetry. Look for a spot where one half does not match the other! B = Borders. Look for a spot that has jagged, ragged or irregular borders. C = Color. Look for a spot that is not evenly colored and often includes multiple colors, especially true black, red, white, blue, grey. D = Diameter. Look for a spot that is larger than the size of a pencil eraser. E = Evolution. If you ever have a spot that is changing in shape, color, size or symptoms (becomes itchy, painful or starts to bleed), always call us! For non-melanoma skin cancers, look for a new, pink spot that is not going away, especially one that is itchy, painful or bleeding. What should I do if I see a spot that is concerning for melanoma or non-melanoma skin cancer? If you are ever concerned, call us! Do not wait for your next appointment. We want to help! SEBORRHEIC KERATOSES  - Relevant exam: Scattered over the trunk/extremities are waxy brown to black plaques and papules with stuck on appearance  - Exam and clinical history consistent with seborrheic keratoses  - Counseled that these are benign growths that do not require treatment  - Counseled that removal of lesions is considered cosmetic and so would incur a fee should patient elect to move forward. - Patient to hold on treatments for now but will inform us should they desire additional treatments    MELANOCYTIC NEVI  -Relevant exam: Scattered over the trunk/extremities are homogenously pigmented brown macules and papules. ELM performed and without concerning findings.   - Exam and clinical history consistent with melanocytic nevi  - Educated on the ABCDE's of melanoma; handout provided  - Counseled to return to clinic prior to scheduled appointment should any of these lesions change or should any new lesions of concern arise  - Counseled on use of sun protection daily. Reviewed latest FDA sunscreen guidelines, including use of broad spectrum (UVA and UVB blocking) sunscreen or sun protective clothing with SPF 30-50 every 2-3 hours and reapplied after exposure to water; use of photoprotective clothing, including a broad brim hat and UPF rated clothing if outdoors for several hours; avoid use of tanning beds as these pose significant risk for melanoma and skin cancer. LENTIGINES  OTHER SKIN CHANGES DUE TO CHRONIC EXPOSURE TO NONIONIZING RADIATION  - Relevant exam: Over sun exposed areas are brown macules. ELM performed and without concerning findings. - Exam and clinical history consistent with lentigines. - Educated that these are indicative of prior sun exposure. - Counseled to return to clinic prior to scheduled appointment should any of these lesions change or should any new lesions of concern arise.  - Recommended use of sunscreen as above and below. - Counseled on use of sun protection daily. Reviewed latest FDA sunscreen guidelines, including use of broad spectrum (UVA and UVB blocking) sunscreen or sun protective clothing with SPF 30-50 every 2-3 hours and reapplied after exposure to water; use of photoprotective clothing, including a broad brim hat and UPF rated clothing if outdoors for several hours; avoid use of tanning beds as these pose significant risk for melanoma and skin cancer. CHERRY ANGIOMAS  - Relevant exam: Scattered over the trunk/extremities are red papules  - Exam and clinical history consistent with cherry angiomas  - Educated that these are benign  - Educated that removal is considered aesthetic and would incur a fee. - Patient does not wish to pursue removal at this time but will contact us should this change.     HISTORY OF MELANOMA   - Patient reported history of melanoma of the  Left upper cheek  - History of stage IA, pT1a melanoma s/p excision  without SLBX  - Family members with melanoma: Sister also has history of meanoma  - Relevant exam: Areas of prior melanoma examined and without evidence of recurrence. Pre/post auricular, cervical, axillary and inguinal lymph nodes without evidence of lymphadenopathy  - Relevant ROS: Denies new vision changes, new or worsening headaches, new lumps/bumps, fevers, chills, night sweats, malaise, changes to areas of prior melanoma, new or changing skin lesions.  - Counseled on use of sun protection daily. Reviewed latest FDA sunscreen guidelines, including use of broad spectrum (UVA and UVB blocking) sunscreen or sun protective clothing with SPF 30-50 every 2-3 hours and reapplied after exposure to water; use of photoprotective clothing, including a broad brim hat and UPF rated clothing if outdoors for several hours; avoid use of tanning beds as these pose significant risk for melanoma and skin cancer.  - Counseled to return to clinic prior to scheduled appointment should any of these areas change or should any new lesions of concern arise    DERMATOFIBROMA    Physical Exam:  Anatomic Location Affected:  Left lower leg  Morphological Description:  Pink firm papule with surrounding hyperpigmentation    Assessment and Plan:  - History and physical consistent with dermatofibroma  - Educated that these lesions are generally benign but there are very rare subtypes that can spread to other parts of the body. - No evidence of eruptive dermatofibromas (not >15 lesions with acute onset)  - Educated the efforts to treat lesions with cryotherapy, shave biopsy and laser surgery are rarely completely successful.   Based on a thorough discussion of this condition and the management approach to it (including a comprehensive discussion of the known risks, side effects and potential benefits of treatment), the patient (family) agrees to implement the following specific plan:  Educated patient to call clinic if lesion changes (enlarges, ulcerates)

## 2023-07-05 NOTE — PROGRESS NOTES
[facility] Note     Patient Name: Rebecca Way  Encounter Date: 7/5/2023     Have you been cared for by a [facility]ologist in the last 3 years and, if so, which description applies to you? Yes. I have been here within the last 3 years, and my medical history has NOT changed since that time. I am FEMALE/of child-bearing potential.    REVIEW OF SYSTEMS:  Have you recently had or currently have any of the following? · No changes in my recent health. PAST MEDICAL HISTORY:  Have you personally ever had or currently have any of the following? If "YES," then please provide more detail. · No changes in my medical history. FAMILY HISTORY:  Any "first degree relatives" (parent, brother, sister, or child) with the following? • No changes in my family's known health. PATIENT EXPERIENCE:    • Do you want the Dermatologist to perform a COMPLETE skin exam today including a clinical examination under the "bra and underwear" areas? Yes  • If necessary, do we have your permission to call and leave a detailed message on your Preferred Phone number that includes your specific medical information?   Yes      Allergies   Allergen Reactions   • Bee Venom      Other reaction(s): fatal-hospitalized as child   • Latex    • Mold Extract [Trichophyton]    • Sulfa Antibiotics       Current Outpatient Medications:   •  albuterol (PROVENTIL HFA,VENTOLIN HFA) 90 mcg/act inhaler, Inhale 1 puff every 4 (four) hours as needed , Disp: , Rfl:   •  Cholecalciferol (VITAMIN D3) 2000 units capsule, Take 2,000 Units by mouth daily, Disp: , Rfl:   •  cycloSPORINE (RESTASIS) 0.05 % ophthalmic emulsion, Apply 0.05 % to eye 2 (two) times a day, Disp: , Rfl:   •  Digestive Enzymes (DIGESTIVE ENZYME ULTRA PO), Use daily , Disp: , Rfl:   •  Glucosamine-Chondroitin 750-600 MG TABS, Take by mouth daily , Disp: , Rfl:   •  Homeopathic Products (SIMILASAN DRY EYE RELIEF) SOLN, Apply to eye if needed, Disp: , Rfl:   • ibuprofen (MOTRIN) 600 mg tablet, Take 600 mg by mouth every 4 (four) hours as needed , Disp: , Rfl:   •  lansoprazole (PREVACID) 30 mg capsule, Take 30 mg by mouth daily, Disp: , Rfl:   •  metoprolol succinate (TOPROL-XL) 25 mg 24 hr tablet, Take 25 mg by mouth Take 1/2 tablet in the morning and half a tablet at night, Disp: , Rfl:   •  Multiple Vitamins-Minerals (MULTIVITAMIN ADULT PO), 2 (two) times a day , Disp: , Rfl:   •  erythromycin (ILOTYCIN) ophthalmic ointment, APPLY THIN FILM TO YOUR FACIAL AND EYELID WOUNDS THREE TIMES PER DAY FOR 1 WEEK (Patient not taking: Reported on 7/5/2023), Disp: , Rfl:   •  Loratadine 10 MG CAPS, Take 10 mg by mouth as needed  (Patient not taking: Reported on 7/5/2023), Disp: , Rfl:   •  omeprazole (PriLOSEC) 20 mg delayed release capsule, Take 20 mg by mouth daily (Patient not taking: Reported on 7/5/2023), Disp: , Rfl:   •  Vitamin E 400 units TABS, Take by mouth (Patient not taking: Reported on 5/10/2023), Disp: , Rfl:           • Whom besides the patient is providing clinical information about today's encounter?   o NO ADDITIONAL HISTORIAN (patient alone provided history)    Physical Exam and Assessment/Plan by Diagnosis:    SEBORRHEIC KERATOSES  - Relevant exam: Scattered over the trunk/extremities are waxy brown to black plaques and papules with stuck on appearance  - Exam and clinical history consistent with seborrheic keratoses  - Counseled that these are benign growths that do not require treatment  - Counseled that removal of lesions is considered cosmetic and so would incur a fee should patient elect to move forward. - Patient to hold on treatments for now but will inform us should they desire additional treatments    MELANOCYTIC NEVI  -Relevant exam: Scattered over the trunk/extremities are homogenously pigmented brown macules and papules. ELM performed and without concerning findings.   - Exam and clinical history consistent with melanocytic nevi  - Educated on the ABCDE's of melanoma; handout provided  - Counseled to return to clinic prior to scheduled appointment should any of these lesions change or should any new lesions of concern arise  - Counseled on use of sun protection daily. Reviewed latest FDA sunscreen guidelines, including use of broad spectrum (UVA and UVB blocking) sunscreen or sun protective clothing with SPF 30-50 every 2-3 hours and reapplied after exposure to water; use of photoprotective clothing, including a broad brim hat and UPF rated clothing if outdoors for several hours; avoid use of tanning beds as these pose significant risk for melanoma and skin cancer. LENTIGINES  OTHER SKIN CHANGES DUE TO CHRONIC EXPOSURE TO NONIONIZING RADIATION  - Relevant exam: Over sun exposed areas are brown macules. ELM performed and without concerning findings. - Exam and clinical history consistent with lentigines. - Educated that these are indicative of prior sun exposure. - Counseled to return to clinic prior to scheduled appointment should any of these lesions change or should any new lesions of concern arise.  - Recommended use of sunscreen as above and below. - Counseled on use of sun protection daily. Reviewed latest FDA sunscreen guidelines, including use of broad spectrum (UVA and UVB blocking) sunscreen or sun protective clothing with SPF 30-50 every 2-3 hours and reapplied after exposure to water; use of photoprotective clothing, including a broad brim hat and UPF rated clothing if outdoors for several hours; avoid use of tanning beds as these pose significant risk for melanoma and skin cancer. CHERRY ANGIOMAS  - Relevant exam: Scattered over the trunk/extremities are red papules  - Exam and clinical history consistent with cherry angiomas  - Educated that these are benign  - Educated that removal is considered aesthetic and would incur a fee. - Patient does not wish to pursue removal at this time but will contact us should this change.     HISTORY OF MELANOMA   - Patient reported history of melanoma of the  Left upper cheek  - History of stage IA, pT1a melanoma s/p excision  without SLBX  - Family members with melanoma: Sister also has history of meanoma  - Relevant exam: Areas of prior melanoma examined and without evidence of recurrence. Pre/post auricular, cervical, axillary and inguinal lymph nodes without evidence of lymphadenopathy  - Relevant ROS: Denies new vision changes, new or worsening headaches, new lumps/bumps, fevers, chills, night sweats, malaise, changes to areas of prior melanoma, new or changing skin lesions.  - Counseled on use of sun protection daily. Reviewed latest FDA sunscreen guidelines, including use of broad spectrum (UVA and UVB blocking) sunscreen or sun protective clothing with SPF 30-50 every 2-3 hours and reapplied after exposure to water; use of photoprotective clothing, including a broad brim hat and UPF rated clothing if outdoors for several hours; avoid use of tanning beds as these pose significant risk for melanoma and skin cancer.  - Counseled to return to clinic prior to scheduled appointment should any of these areas change or should any new lesions of concern arise    DERMATOFIBROMA    Physical Exam:  • Anatomic Location Affected:  Left lower leg  • Morphological Description:  Pink firm papules x3 with surrounding hyperpigmentation    Assessment and Plan:  - History and physical consistent with dermatofibroma  - Educated that these lesions are generally benign but there are very rare subtypes that can spread to other parts of the body. - No evidence of eruptive dermatofibromas (not >15 lesions with acute onset)  - Educated the efforts to treat lesions with cryotherapy, shave biopsy and laser surgery are rarely completely successful.   Based on a thorough discussion of this condition and the management approach to it (including a comprehensive discussion of the known risks, side effects and potential benefits of treatment), the patient (family) agrees to implement the following specific plan:  • Educated patient to call clinic if lesion changes (enlarges, ulcerates)       Scribe Attestation    I,:  Yesenia Massey MA am acting as a scribe while in the presence of the attending physician.:       I,:  Campos Wasserman MD personally performed the services described in this documentation    as scribed in my presence.:

## 2023-09-05 ENCOUNTER — TELEPHONE (OUTPATIENT)
Dept: HEMATOLOGY ONCOLOGY | Facility: CLINIC | Age: 68
End: 2023-09-05

## 2023-09-05 NOTE — TELEPHONE ENCOUNTER
Patient Call    Who are you speaking with? Patient    If it is not the patient, are they listed on an active communication consent form? N/A   What is the reason for this call? Patient calling in to check that she has a follow up scheduled for 12/11/23 with Dr. Elgin Estes. Patient was also looking for advice about another MD she was supposed to schedule with through [facility]. I informed her to call [facility] to see if she needs to schedule the appointment. Does this require a call back? No   If a call back is required, please list best call back number n/a   If a call back is required, advise that a message will be forwarded to their care team and someone will return their call as soon as possible. Did you relay this information to the patient?  No

## 2023-09-13 ENCOUNTER — OFFICE VISIT (OUTPATIENT)
Dept: PSYCHIATRY | Facility: CLINIC | Age: 68
End: 2023-09-13

## 2023-09-13 DIAGNOSIS — R41.3 MEMORY LOSS: ICD-10-CM

## 2023-09-13 DIAGNOSIS — F07.81 POSTCONCUSSION SYNDROME: Primary | ICD-10-CM

## 2023-09-13 NOTE — PSYCH
Testing was completed today by a trained and supervised technician. Formal neuropsychological report to follow.

## 2023-09-26 ENCOUNTER — OFFICE VISIT (OUTPATIENT)
Dept: PSYCHIATRY | Facility: CLINIC | Age: 68
End: 2023-09-26

## 2023-09-26 ENCOUNTER — TELEPHONE (OUTPATIENT)
Dept: NEUROLOGY | Facility: CLINIC | Age: 68
End: 2023-09-26

## 2023-09-26 DIAGNOSIS — F07.81 POSTCONCUSSION SYNDROME: Primary | ICD-10-CM

## 2023-09-26 DIAGNOSIS — R41.3 MEMORY LOSS: ICD-10-CM

## 2023-09-26 LAB — HBA1C MFR BLD HPLC: 5.5 %
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
## 2023-09-26 NOTE — PSYCH
Neuropsychological Evaluation  [facility]  [address] [address] [facility] [facility]  P: [phone] F: [phone]     Feedback from Neuropsychological Evaluation     Ms. Mekhi Durant returned for a feedback appointment to review the findings and recommendations from a neuropsychological evaluation conducted on 09/13/2023. Findings from the evaluation were discussed and the patient expressed understanding. Recommendations were reviewed (see evaluation report from 09/13/2023 for full details). The patient was given the opportunity to ask questions and expressed satisfaction with answers provided. Contact information for this provider was given to the patient and she was encouraged to contact the office with any further questions or concerns. The neuropsychological evaluation report was routed to the referring provider, Nicki Rogers MD, for review and follow-up as needed. Mukesh Izaguirre PsyD  Clinical Neuropsychologist  [place] Licensed Psychologist  [facility]. #[id number]     Tasks Conducted Total Time Spent Associated CPT Codes   Clinical Interview 75 min. 89792 x 1 unit  96121 x 4 units   Report Writing 197 min. Neuropsychological Test Administration (PhD/PsyD) 0 min. 35922 x 1 unit  96137 x 0 units   Scoring (PhD/PsyD) 42 min. Neuropsychological Test Administration [facility].) 153 min. 70160 x 1 unit  96139 x 7 units   Scoring (Tech.) 75 min. Chart Review 26 min. 61688 x 1 unit  96133 x 1 units   Interpretation of Test Data 24 min. Feedback to Patient/Family Members 57 min.

## 2023-09-26 NOTE — TELEPHONE ENCOUNTER
----- Message from Vinayak Coreas PsyD sent at 9/26/2023  2:29 PM EDT -----  Hi Dr. Quita Lesch,    MsYeni Thompson's neuropsychological report has been uploaded and can be found under the Media section. It does not appear that she has a follow-up appointment. Please let me know if you have any questions!     Thanks,  Rainer Torres

## 2023-09-26 NOTE — TELEPHONE ENCOUNTER
Looks like pt needs follow up appt to review neurocogntive eval with her. [facility]. Ok to use NP slot.

## 2023-09-29 ENCOUNTER — TELEPHONE (OUTPATIENT)
Dept: NEUROLOGY | Facility: CLINIC | Age: 68
End: 2023-09-29

## 2023-10-06 NOTE — TELEPHONE ENCOUNTER
Patient is reschedule 10/16/23 at [facility] in [facility]. This is the only thing available nothing in [place]. Patient is agreeable with this appointment.

## 2023-10-12 ENCOUNTER — TELEPHONE (OUTPATIENT)
Dept: NEUROLOGY | Facility: CLINIC | Age: 68
End: 2023-10-12

## 2023-10-16 ENCOUNTER — TELEPHONE (OUTPATIENT)
Dept: PSYCHIATRY | Facility: CLINIC | Age: 68
End: 2023-10-16

## 2023-10-16 ENCOUNTER — OFFICE VISIT (OUTPATIENT)
Dept: NEUROLOGY | Facility: CLINIC | Age: 68
End: 2023-10-16
Payer: COMMERCIAL

## 2023-10-16 VITALS
WEIGHT: 173 LBS | TEMPERATURE: 96.7 F | HEART RATE: 90 BPM | BODY MASS INDEX: 33.96 KG/M2 | HEIGHT: 60 IN | SYSTOLIC BLOOD PRESSURE: 144 MMHG | DIASTOLIC BLOOD PRESSURE: 86 MMHG

## 2023-10-16 DIAGNOSIS — F39 MOOD DISORDER (HCC): ICD-10-CM

## 2023-10-16 DIAGNOSIS — R56.00 SIMPLE FEBRILE CONVULSIONS (HCC): ICD-10-CM

## 2023-10-16 DIAGNOSIS — R41.3 MEMORY LOSS: ICD-10-CM

## 2023-10-16 DIAGNOSIS — G35 MULTIPLE SCLEROSIS (HCC): Primary | ICD-10-CM

## 2023-10-16 DIAGNOSIS — G47.33 OSA (OBSTRUCTIVE SLEEP APNEA): ICD-10-CM

## 2023-10-16 PROCEDURE — 99215 OFFICE O/P EST HI 40 MIN: CPT | Performed by: PSYCHIATRY & NEUROLOGY

## 2023-10-16 RX ORDER — METOPROLOL TARTRATE 37.5 MG/1
1 TABLET, FILM COATED ORAL 2 TIMES DAILY
COMMUNITY
Start: 2023-09-20

## 2023-10-16 NOTE — PATIENT INSTRUCTIONS
Follow up with Sleep medicine team for evaluation of sleep apnea. Follow up with behavioral health for mood disorder. Follow up with speech therapy (Cognition therapy). Try to get some exercise few times per week as much as possible! Return in about 6 months (around 4/16/2024).

## 2023-10-16 NOTE — ASSESSMENT & PLAN NOTE
78 yo right handed female w/ PMH of multiple concussions (8) due to injuries, melanoma (dx in 02/28/2023, s/p bx and MMO surgery, reconstruction at [facility]), MS with a fairly benign course (never on IMD therapy) seen for f/u. Neuro exam is stable and has been infextions/illness free/trips/falls. No new neurological symptoms. She has been stable since last visit MS symptom wise. She wears bifocals. Does not require any devices to ambulate. W/U: Imaging personally reviewed and reviewed Records from    MRI brain neuroquant: 12/2022 Stable white matter lesions consistent with mild chronic demyelinating disease. NeuroQuant analysis was performed: Normal study; Does not support neurodegeneration. Labs from dec 2022- vit b12 nl at 948, tsh nl 1.69, rpr nr  Labs from Sep 2023- CMP nl, A1c 5.5%, lipid panel showed chol 133, chol 206. Plan   continue to monitor clinically off IMDs   Encouraged regular exercise as able  Return in about 6 months (around 4/16/2024).
Has had memory and concentration issues (increased disorganized thinking and diff focusing). To date, no new sxs  Labs from dec 2022- vit b12 nl at 948, tsh nl 1.69, rpr nr  MR brain scan, neuroQuant analysis (12/31/22):  stable chronic ms findings and normal neuroquant without evidence of neurodegeneration  Underwent neurocog eval in 9/2023 with Dr. Edvin Carias - [address] [place] [address] diagnosed (etiology- mood disorder, untreated CURT). Per Dr. Severino Francis notes, patient's neurospsych testing showed premorbid intellectual estimate within above avg range. She showed difficulties with semantic verbal fluency, verbal retrieval, mild difficulty w/ verbal encoding, retention and simple auditory attn. Her syx localized primarily to L temporal lobe difficulties. Imp: Memory difficulties multifactorial due to CURT, mood disorder. Prior MR scans have r/o a structural brain lesion, neurodegenerative process (also clinically does not show signs of it). MS is mild and has been stable for years; cognitive fatigue would be most likely to occur during first 5 years after dx onset. Therefore, cognitive fatigue due to MS seems less likely to be the major cause of her cognitive syx.     Plan:  Speech therapy (cognitive) referral provided  Sleep medicine referral provided to re-evaluate CURT  Behavioral health referral provided (talk therapy)- mood disorder
Patient expressed no known problems or needs

## 2023-10-16 NOTE — TELEPHONE ENCOUNTER
Spoke to patient in regards to routine referral, patient is looking for talk therapy. Patient would like to be added to talk therapy wait list, male or female and [place].

## 2023-10-16 NOTE — PROGRESS NOTES
Patient ID: Yousuf Hartman is a 77 y.o. female. Assessment/Plan:    Multiple sclerosis ([address])  78 yo right handed female w/ PMH of multiple concussions (8) due to injuries, melanoma (dx in 02/28/2023, s/p bx and MMO surgery, reconstruction at [facility]), MS with a fairly benign course (never on IMD therapy) seen for f/u. Neuro exam is stable and has been infextions/illness free/trips/falls. No new neurological symptoms. She has been stable since last visit MS symptom wise. She wears bifocals. Does not require any devices to ambulate. W/U: Imaging personally reviewed and reviewed Records from    MRI brain neuroquant: 12/2022 Stable white matter lesions consistent with mild chronic demyelinating disease. NeuroQuant analysis was performed: Normal study; Does not support neurodegeneration. Labs from dec 2022- vit b12 nl at 948, tsh nl 1.69, rpr nr  Labs from Sep 2023- CMP nl, A1c 5.5%, lipid panel showed chol 133, chol 206. Plan   continue to monitor clinically off IMDs   Encouraged regular exercise as able  Return in about 6 months (around 4/16/2024). Memory loss  Has had memory and concentration issues (increased disorganized thinking and diff focusing). To date, no new sxs  Labs from dec 2022- vit b12 nl at 948, tsh nl 1.69, rpr nr  MR brain scan, neuroQuant analysis (12/31/22):  stable chronic ms findings and normal neuroquant without evidence of neurodegeneration  Underwent neurocog eval in 9/2023 with Dr. Dawn Machado - [address] [place] [address] diagnosed (etiology- mood disorder, untreated CURT). Per Dr. Anuj Raman notes, patient's neurospsych testing showed premorbid intellectual estimate within above avg range. She showed difficulties with semantic verbal fluency, verbal retrieval, mild difficulty w/ verbal encoding, retention and simple auditory attn. Her syx localized primarily to L temporal lobe difficulties. Imp: Memory difficulties multifactorial due to CURT, mood disorder.  Prior MR scans have r/o a structural brain lesion, neurodegenerative process (also clinically does not show signs of it). MS is mild and has been stable for years; cognitive fatigue would be most likely to occur during first 5 years after dx onset. Therefore, cognitive fatigue due to MS seems less likely to be the major cause of her cognitive syx. Plan:  Speech therapy (cognitive) referral provided  Sleep medicine referral provided to re-evaluate CURT  Behavioral health referral provided (talk therapy)- mood disorder       Diagnoses and all orders for this visit:    Multiple sclerosis (HCC)    CURT (obstructive sleep apnea)  -     Ambulatory Referral to Sleep Medicine; Future    Memory loss  -     Ambulatory referral to [employer]; Future  -     Ambulatory Referral to Speech Therapy; Future    Mood disorder ([address])  -     Ambulatory referral to [employer]; Future    Simple febrile convulsions (HCC)    Other orders  -     Metoprolol Tartrate 37.5 MG TABS; Take 1 tablet by mouth 2 (two) times a day           Subjective:    HPI    Ms. Liset Swanson is a 77 y.o. right handed female who presents for f/u of MS, memory difficulties. Last visit with Bradley Dawn in May 2023. Pertinent PMH: multiple concussions (8) due to multiple injuries in the past, melanoma (dx in 02/28/2023 and had bx and the MMO surgery and reconstruction at [facility]). Regarding MS:   Has had a fairly benign course (has never been on any IMD therapy), past memory and concentration issues (previous visits increased disorganized thinking and diff focusing). Prior  MRI c-spine from January 2016 compared to Nov 2014. Stable MR appearance of the cervical cord. Minor prominence of the central canal. No pathological enhancement. Mild cervical spondylosis and osteoarthritis. Rev last MRI t-spine on 1/23/17 compared to 9/2015. Stable appearance of the thoracic cord. Prominent central canal T7-T9. No indication of demyelinating disease.  Rev MRI brain done in Sept 2015 and stable compared to Nov 2014 with mild degree of chronic demyelinating disease. She remains off IMD therapy for her MS. Regarding memory issues:   Has had memory and concentration issues (increased disorganized thinking and diff focusing). She would have moments of taking longer to answer questions. She is driving and working w/o much issue. She usually does a reminder via writing. She does not get lost familiar places. She is not missing any medications/bills. She denies behavioral issues. No hallucinations. She does state she sometimes get easily distracted when she decides to watch tv in the middle of doing things. MRI brain neuroquant: 12/31/2022- Stable white matter lesions consistent with mild chronic demyelinating disease. NeuroQuant analysis was performed: Normal study; Does not support neurodegeneration. Labs from dec 2022- vit b12 nl at 948, tsh nl 1.69, rpr nr  Labs from Sep 2023- CMP nl, A1c 5.5%, lipid panel showed chol 133, chol 206. Underwent neurocog eval in 9/2023 with Dr. Dawn Machado - [address] [place] [address] diagnosed (etiology- mood disorder, untreated CURT). Per Dr. Anuj Raman notes, patient's neurospsych testing showed premorbid intellectual estimate within above avg range. She showed difficulties with semantic verbal fluency, verbal retrieval, mild difficulty w/ verbal encoding, retention and simple auditory attn. Her syx localized primarily to L temporal lobe difficulties. She saw a psychiatrist in past and off all SSRIs right now. Had melanoma dx and got Mohs surgery and had it fully resected with [facility]. She goes to the ophthalmologist once a year but has one in 08.2023 and mentions like waterfall vision once a / year. No recent sickness, urgent care visits, no trips, no loss or bowel or bladder  Sje denies any fevers/chill, chest pain, shortness of breath, abdominal pain, nausea, vomiting, diarrhea, problems urinating, problems  With bowel movements, and is eating/drinking without problem. She denies any headaches, syncope, paresthesias, diplopia, visual changes, tinnitus, LOC, sudden onset weakness, garbled speech, dysarthria/slurring of words,  Loss of bowel or bladder, no trouble w/ speech or swallowing. The following portions of the patient's history were reviewed and updated as appropriate:   She  has a past medical history of Asthma, Hypertension, Multiple sclerosis ([address]), Post concussive syndrome, and Psychiatric disorder. Objective:    Blood pressure 144/86, pulse 90, temperature (!) 96.7 °F (35.9 °C), height 5' (1.524 m), weight 78.5 kg (173 lb). Physical Exam:   Vitals reviewed  General Examination: No distress, cooperative. Obese. NCAT. NAD. Normal neck flexion and ROM. AAOx3. Speech fluent without errors. Normal naming and repetition. Follows cross-body commands. Pupils equal. VFF. EOMI. No nystagmus. Face symmetric. No dysarthria. Uvula midline. Tongue midline. Normal tone and bulk throughout. Muscle strength testing by the MRC scale was 5/5 in the deltoid, biceps, triceps, wrist extensors, wrist flexors, finger extensors, finger flexors,. Hip flexors, quadriceps, ankle dorsiflexors, ankle plantar flexors, and EHL bilaterally. No drift or orbiting. No abnormal movements. Symmetric LT sensation t/o. No extinction to DSS. Reflexes: +2, and symmetric (biceps, brachioradialis, triceps, patella). Absent Salas's b/l. Intact FNF b/l. No truncal ataxia. Normal casual gait and turns. ROS:    Review of Systems   Constitutional:  Negative for appetite change, fatigue and fever. HENT: Negative. Negative for hearing loss, tinnitus, trouble swallowing and voice change. Eyes: Negative. Negative for photophobia, pain and visual disturbance. Respiratory: Negative. Negative for shortness of breath. Cardiovascular: Negative. Negative for palpitations. Gastrointestinal: Negative. Negative for nausea and vomiting. Endocrine: Negative.   Negative for cold intolerance. Genitourinary: Negative. Negative for dysuria, frequency and urgency. Musculoskeletal:  Negative for back pain, gait problem, myalgias and neck pain. Skin: Negative. Negative for rash. Allergic/Immunologic: Negative. Neurological: Negative. Negative for dizziness, tremors, seizures, syncope, facial asymmetry, speech difficulty, weakness, light-headedness, numbness and headaches. Hematological: Negative. Does not bruise/bleed easily. Psychiatric/Behavioral: Negative. Negative for confusion, hallucinations and sleep disturbance. All other systems reviewed and are negative.     No new issues to address      [place] TRACEY Dumont.  PGY-3, Neurology

## 2023-10-16 NOTE — PROGRESS NOTES
Speech-Language Pathology Initial Evaluation    Today's date: 10/18/2023  Patient’s name: Derrek Lee  : 1955  MRN: [id number]  Safety measures: MS  Referring provider: Donald Cross MD    Encounter Diagnosis     ICD-10-CM    1. Other symbolic dysfunctions  P71.6       2. Memory loss  R41.3 Ambulatory Referral to Speech Therapy        Visit tracking:  -Referring provider: Epic  -Billing guidelines: CMS  -Visit # Asad Lantigua required at IE**)  -Insurance: [id number] due 2023    Subjective comments: Patient presented to today's appointment with c/o changes with her memory over the past "several years". Patient noted that she "checks" herself to ensure accuracy with her performance on tasks at work, for example. Patient noted that her auditory memory is weak (doesn't want to "absorb"), as well as her visual memory (due to visual disturbances). Patient also reported that she has noticed a change in her word finding abilities ("grasping for words") for approximately the past year. Patient is a full-time , and keeps active within her [employer] and community. How did the patient hear about us? Physician    Patient's goal(s): "to improve memory"    Reason for referral: Change in cognitive status and Decreased language skills  Prior functional status: Communication effective and appropriate in all situations  Clinically complex situations: N/A    History: Patient is a 77 y.o. female who was referred to outpatient skilled Speech Therapy services for a cognitive-linguistic evaluation. Patient was last seen by neurology on 10/16/2023. Per chart review, patient with memory and concentration issues (e.g., taking longer to process and complete her job duties, staying on task, disorganized thinking, difficulty focusing, moments of taking longer to answer questions).  Patient with a significant h/o MS (fairly benign course; never on IMD therapy), multiple concussions (8), melanoma (dx in 02/28/2023, s/p bx and MMO surgery, reconstruction at [address]), CURT. Per neurologist, ". ..cognitive testing completed on 9/26/23. Rev in detail all testing results with pt at appt. Extended appt for review due to multiple comorbidities also contributing to sxs. Pt testing reveals above ave pre morbid abilities. Pt with marked diff with semantic verbal fluency, and verbal retrieval.  Pt with mild verbal encoding and retention issues and simple auditiory attention difficulties. Intact processing speed, language, visuospatial skills, executive function and memory for visual materials. Due to above difficulties, testing most fit MCI. Exact etiology unknown. Due to intact executive fx, less likely MS and head injury. Due to intact visual memory and confrtonatational naming , less likely AD. Per testing, untreated CURT and mood disorder likely factor. Pt notes being seen by sleep med many yrs ago and needing cpap but not feeling better using it so she stopped. Rec reconnecting with sleep team for further eval. Pt notes overall poor sleep hygiene for many yrs. Up many times per night. Pt also seen by therapists in past for mood, both psychology and spiritual leader like . Rec pt to reconnect with psychology team to help with any mood issues ie depression that can also play factor. Also rec speech / cognitive behavior therapy to help with verbal retention and fluency and retrieval as these were most affected areas. Pt is in agreement. .." MRI brain neuroQuant analysis performed on 12/31/2022 revealed stable chronic white matter lesions consistent with mild chronic demyelinating disease; normal neuroquant without evidence of neurodegeneration.      Hearing: [facility] for testing  Vision: [facility] for testing (wears corrective lens; dry eyes--red, painful)     Home environment/lifestyle: Lives independently  Highest level of education: Bachelor's degree  Vocational status: full-time ()    Mental status: Alert  Behavior status: Cooperative  Communication modalities: Verbal  Rehabilitation prognosis: Good rehab potential to reach the established goals    Assessments    COGNITIVE CHECKLIST:  *Patient indicated that she is experiencing difficulties in the following areas:    Memory: Remembering what people have told you, Remembering peoples' names, and Remembering the date/day of the week    Attention: Easily distracted, Dividing your attention (i.e., multi-tasking), and Losing your train of thought    Processing: Following directions    Executive Functions: Planning daily tasks    Communication: Word finding in conversation and Expressing thoughts and ideas into writing    Visual: Losing spot on the page when reading and Change in handwriting (i.e., sloppier)    Emotional: Increased anxiety and Increased depression    Increased Sensitivities to: Lighting, Noise, and Crowds or busy environments      The Repeatable Battery for the Assessment of Neuropsychological Status (RBANS) is a brief, individually-administered assessment which measures attention, language, visuospatial/constructional abilities, and immediate & delayed memory. The RBANS is intended for use with adolescents to adults, ages 15 to 80 years. The following results were obtained during the administration of the assessment. Form: D    Cognitive Domain/Subtest: Index Score: Percentile Rank: Classification:   IMMEDIATE MEMORY 85 16%ile Low Average        1. List Learning (27/40) (scaled score: 11 - Average)        2. Story Memory (8/24) (scaled score: 4 - Borderline)       VISUOSPATIAL/  CONSTRUCTIONAL 112 79%ile High Average        3. Figure Copy (20/20) (scaled score: 14 - Superior)        4. Line Orientation (18/20) (percentile group: 51-75 - Average)       LANGUAGE 105 63%ile Average        5. Picture Naming (10/10) (percentile group: 51-75 - Average)        6.  Semantic Fluency (21/40) (scaled score: 11 - Average)       ATTENTION 100 50%ile Average        7. Digit Span (8/16) (scaled score: 7 - Low Average)        8. Coding (50/89) (scaled score: 13 - High Average)       DELAYED MEMORY 90 25%ile Average        9. List Recall (2/10) (percentile group: 10-16 - Low Average)        10. List Recognition (20/20) (percentile group: 51-75 - Average)        11. Story Recall (3/12) (scaled score: 4 - Borderline)        12. Figure Recall (9/20) (scaled score: 8 - Average)         Sum of Index Scores:  492   Total Score:  91   Percentile: 42%ile   Classification: Average       *Patient named 19 concrete category members (articles of clothing) in 60 sec (norm=15+). -- AVERAGE    *Patient named 23 abstract category members (words beginning with letter 'm') in 60 sec (norm=10+). -- AVERAGE    Goals    Short-term goals:  Patient will be educated on the use of internal and external memory aids/compensatory strategies to facilitate increased functional recall in home, community, and work settings. (to be achieved in 1-2 weeks). Patient will complete auditory immediate and short-term memory tasks with 90% accuracy to facilitate increased ability to retell narratives and recall information within functional living environment (to be achieved in 4-6 weeks). Patient will complete complex auditory attention processing tasks (e.g., sentence unscrambles, ranking numbers/words, etc.) with 90% accuracy to facilitate increased processing, storage, and retrieval of functional information (to be achieved in 4-6 weeks). Patient will be educated on word finding strategies (i.e., circumlocution) for improved generative naming and verbal expression skills (to be achieved in 1-2 weeks).      Patient will complete higher-level word generation tasks (e.g., verbal fluency, categorization, analogies, synonyms/antonyms, , etc.) with 90% accuracy using word finding strategies to facilitate improved word retrieval skills (to be achieved in 4-6 weeks). Long-term goals:  Patient will demonstrate cognitive-linguistic skills consistent with age and education given the use of compensatory strategies when needed to facilitate increased success with activities and responsibilities in home, community, and work settings (to be achieved by discharge). Patient will demonstrate adequate verbal expression during conversation without breakdowns or word finding deficits given the use of compensatory strategies when needed (to be achieved by discharge). Impressions/Recommendations    Impressions:   -Patient participated in a standardized cognitive-linguistic assessment on this date of service. Patient scored within the "Low Average" level for the Immediate Memory domain and within the "Average" level for the Visuospatial/Constructional, Language, Attention, & Delayed Memory domains. Below average scores were obtained in the following subtasks: story memory (immediate recall), digit span (attention), list recall (delayed recall), and story memory (delayed recall). Patient would benefit from outpatient skilled Speech Therapy services to facilitate increased functional recall, successful completion of daily tasks, following of directions within functional activities, support positive communication interactions with both familiar and unfamiliar listeners, promote safety, facilitate overall improved quality of life, and receive instruction on HEP. -It should be noted that patient completed a neuropsychological evaluation on 09/25/2023. Per chart review, the findings revealed that patient's performance on testing correlated most closely with MCI (exact etiology unknown). As indicated, due to intact executive functioning skills, it is less likely to be related to MS or TBI. Due to intact visual memory and confrontational naming, it is less likely to be related to AD.  Per testing, untreated CURT and mood disorder are likely factors.     Recommendations:  -Patient would benefit from outpatient skilled Speech Therapy services: Cognitive-linguistic therapy    -Frequency: 1x weekly  -Duration: 4-6 weeks    -Intervention certification from: 65/09/3813  -Intervention certification to: 77/93/9107    -Intervention comments:   -Initial evaluation/administration of cognitive-linguistic standardized test (RBANS) with patient (60 minutes)  -Scoring/interpretation of the standardized test for the development of POC, and write-up of the report (60 minutes)

## 2023-10-18 ENCOUNTER — EVALUATION (OUTPATIENT)
Dept: SPEECH THERAPY | Facility: CLINIC | Age: 68
End: 2023-10-18
Payer: COMMERCIAL

## 2023-10-18 DIAGNOSIS — R48.8 OTHER SYMBOLIC DYSFUNCTIONS: Primary | ICD-10-CM

## 2023-10-18 DIAGNOSIS — R41.3 MEMORY LOSS: ICD-10-CM

## 2023-10-18 PROCEDURE — 96125 COGNITIVE TEST BY HC PRO: CPT | Performed by: SPEECH-LANGUAGE PATHOLOGIST

## 2023-11-02 ENCOUNTER — OFFICE VISIT (OUTPATIENT)
Dept: SPEECH THERAPY | Facility: CLINIC | Age: 68
End: 2023-11-02
Payer: COMMERCIAL

## 2023-11-02 DIAGNOSIS — R41.3 MEMORY LOSS: ICD-10-CM

## 2023-11-02 DIAGNOSIS — R48.8 OTHER SYMBOLIC DYSFUNCTIONS: Primary | ICD-10-CM

## 2023-11-02 PROCEDURE — 92507 TX SP LANG VOICE COMM INDIV: CPT

## 2023-11-02 NOTE — PROGRESS NOTES
Daily Speech Treatment Note    Today's date: 2023  Patient’s name: Abida Varghese  : 1955  MRN: [id number]  Safety measures: none  Referring provider: Kristina Staples MD    Encounter Diagnosis     ICD-10-CM    1. Other symbolic dysfunctions  K59.7       2. Memory loss  R41.3         Visit tracking:  -Referring provider: Epic  -Billing guidelines: CMS  -Visit #2 Robert Guevara required at IE**)  -Insurance: [id number] [place] 2023    Subjective/Behavioral:  -Patient expressed today that she does not want to proceed with speech therapy. She does not feel a need at this time as she is managing well. She was provided with a homework packet of cognitive-linguistic exercises and encouraged to reach out if anything changes. Objective/Assessment: Reviewed RBANS scores/results with patient. Short-term goals:  Patient will be educated on the use of internal and external memory aids/compensatory strategies to facilitate increased functional recall in home, community, and work settings. (to be achieved in 1-2 weeks). Patient will complete auditory immediate and short-term memory tasks with 90% accuracy to facilitate increased ability to retell narratives and recall information within functional living environment (to be achieved in 4-6 weeks). Patient will complete complex auditory attention processing tasks (e.g., sentence unscrambles, ranking numbers/words, etc.) with 90% accuracy to facilitate increased processing, storage, and retrieval of functional information (to be achieved in 4-6 weeks). Patient will be educated on word finding strategies (i.e., circumlocution) for improved generative naming and verbal expression skills (to be achieved in 1-2 weeks).      Patient will complete higher-level word generation tasks (e.g., verbal fluency, categorization, analogies, synonyms/antonyms, , etc.) with 90% accuracy using word finding strategies to facilitate improved word retrieval skills (to be achieved in 4-6 weeks). Plan:  -Discharge.  Per patient request

## 2023-11-09 ENCOUNTER — APPOINTMENT (OUTPATIENT)
Dept: SPEECH THERAPY | Facility: CLINIC | Age: 68
End: 2023-11-09
Payer: COMMERCIAL

## 2023-11-16 ENCOUNTER — APPOINTMENT (OUTPATIENT)
Dept: SPEECH THERAPY | Facility: CLINIC | Age: 68
End: 2023-11-16
Payer: COMMERCIAL

## 2023-11-30 ENCOUNTER — APPOINTMENT (OUTPATIENT)
Dept: SPEECH THERAPY | Facility: CLINIC | Age: 68
End: 2023-11-30
Payer: COMMERCIAL

## 2023-12-05 ENCOUNTER — TELEPHONE (OUTPATIENT)
Dept: HEMATOLOGY ONCOLOGY | Facility: CLINIC | Age: 68
End: 2023-12-05

## 2023-12-05 NOTE — TELEPHONE ENCOUNTER
Appointment Confirmation   Who are you speaking with? Patient   If it is not the patient, are they listed on an active communication consent form? N/A   Which provider is the appointment scheduled with? Dr. Jeff Virk   When is the appointment scheduled? Please list date and time 12/11/2023 @8:20AM   At which location is the appointment scheduled to take place? [facility]   Did caller verbalize understanding of appointment details?  Yes

## 2023-12-09 ENCOUNTER — APPOINTMENT (OUTPATIENT)
Dept: LAB | Facility: CLINIC | Age: 68
End: 2023-12-09
Payer: COMMERCIAL

## 2023-12-09 DIAGNOSIS — C43.30 MALIGNANT MELANOMA OF FACE (HCC): ICD-10-CM

## 2023-12-09 LAB
ALBUMIN SERPL BCP-MCNC: 4.2 G/DL (ref 3.5–5)
ALP SERPL-CCNC: 62 U/L (ref 34–104)
ALT SERPL W P-5'-P-CCNC: 20 U/L (ref 7–52)
ANION GAP SERPL CALCULATED.3IONS-SCNC: 6 MMOL/L
AST SERPL W P-5'-P-CCNC: 20 U/L (ref 13–39)
BASOPHILS # BLD AUTO: 0.03 THOUSANDS/ÂΜL (ref 0–0.1)
BASOPHILS NFR BLD AUTO: 1 % (ref 0–1)
BILIRUB SERPL-MCNC: 0.66 MG/DL (ref 0.2–1)
BUN SERPL-MCNC: 16 MG/DL (ref 5–25)
CALCIUM SERPL-MCNC: 9.4 MG/DL (ref 8.4–10.2)
CHLORIDE SERPL-SCNC: 105 MMOL/L (ref 96–108)
CO2 SERPL-SCNC: 30 MMOL/L (ref 21–32)
CREAT SERPL-MCNC: 0.69 MG/DL (ref 0.6–1.3)
EOSINOPHIL # BLD AUTO: 0.09 THOUSAND/ÂΜL (ref 0–0.61)
EOSINOPHIL NFR BLD AUTO: 2 % (ref 0–6)
ERYTHROCYTE [DISTWIDTH] IN BLOOD BY AUTOMATED COUNT: 11.7 % (ref 11.6–15.1)
GFR SERPL CREATININE-BSD FRML MDRD: 88 ML/MIN/1.73SQ M
GLUCOSE P FAST SERPL-MCNC: 98 MG/DL (ref 65–99)
HCT VFR BLD AUTO: 38.8 % (ref 34.8–46.1)
HGB BLD-MCNC: 13.4 G/DL (ref 11.5–15.4)
IMM GRANULOCYTES # BLD AUTO: 0.02 THOUSAND/UL (ref 0–0.2)
IMM GRANULOCYTES NFR BLD AUTO: 1 % (ref 0–2)
LDH SERPL-CCNC: 159 U/L (ref 140–271)
LYMPHOCYTES # BLD AUTO: 1.66 THOUSANDS/ÂΜL (ref 0.6–4.47)
LYMPHOCYTES NFR BLD AUTO: 41 % (ref 14–44)
MCH RBC QN AUTO: 33.1 PG (ref 26.8–34.3)
MCHC RBC AUTO-ENTMCNC: 34.5 G/DL (ref 31.4–37.4)
MCV RBC AUTO: 96 FL (ref 82–98)
MONOCYTES # BLD AUTO: 0.34 THOUSAND/ÂΜL (ref 0.17–1.22)
MONOCYTES NFR BLD AUTO: 8 % (ref 4–12)
NEUTROPHILS # BLD AUTO: 1.96 THOUSANDS/ÂΜL (ref 1.85–7.62)
NEUTS SEG NFR BLD AUTO: 47 % (ref 43–75)
NRBC BLD AUTO-RTO: 0 /100 WBCS
PLATELET # BLD AUTO: 226 THOUSANDS/UL (ref 149–390)
PMV BLD AUTO: 9.8 FL (ref 8.9–12.7)
POTASSIUM SERPL-SCNC: 4.6 MMOL/L (ref 3.5–5.3)
PROT SERPL-MCNC: 6.5 G/DL (ref 6.4–8.4)
RBC # BLD AUTO: 4.05 MILLION/UL (ref 3.81–5.12)
SODIUM SERPL-SCNC: 141 MMOL/L (ref 135–147)
WBC # BLD AUTO: 4.1 THOUSAND/UL (ref 4.31–10.16)

## 2023-12-09 PROCEDURE — 36415 COLL VENOUS BLD VENIPUNCTURE: CPT

## 2023-12-09 PROCEDURE — 85025 COMPLETE CBC W/AUTO DIFF WBC: CPT

## 2023-12-09 PROCEDURE — 80053 COMPREHEN METABOLIC PANEL: CPT

## 2023-12-09 PROCEDURE — 83615 LACTATE (LD) (LDH) ENZYME: CPT

## 2023-12-11 ENCOUNTER — TELEPHONE (OUTPATIENT)
Dept: HEMATOLOGY ONCOLOGY | Facility: CLINIC | Age: 68
End: 2023-12-11

## 2023-12-11 NOTE — TELEPHONE ENCOUNTER
Appointment Change  Cancel, Reschedule, Change to Virtual      Who are you speaking with? Patient   If it is not the patient, is the caller listed on the communication consent form? N/A   Which provider is the appointment scheduled with? Dr. Mi Merino   When was the original appointment scheduled? Please list date and time 12/11/23 @ 820   At which location is the appointment scheduled to take place? [facility]   Was the appointment rescheduled? Was the appointment changed from an in person visit to a virtual visit? If so, please list the details of the change. 12/13/23 @ 940   What is the reason for the appointment change? Patient couldn't make it on time       Was STAR transport scheduled? No   Does STAR transport need to be scheduled for the new visit (if applicable) No   Does the patient need an infusion appointment rescheduled? No   Does the patient have an upcoming infusion appointment scheduled? If so, when? No   Is the patient undergoing chemotherapy? No   For appointments cancelled with less than 24 hours:  Was the no-show policy reviewed?  Yes

## 2023-12-13 ENCOUNTER — OFFICE VISIT (OUTPATIENT)
Dept: HEMATOLOGY ONCOLOGY | Facility: CLINIC | Age: 68
End: 2023-12-13
Payer: COMMERCIAL

## 2023-12-13 VITALS
BODY MASS INDEX: 35.14 KG/M2 | HEIGHT: 60 IN | TEMPERATURE: 98.6 F | DIASTOLIC BLOOD PRESSURE: 86 MMHG | RESPIRATION RATE: 18 BRPM | OXYGEN SATURATION: 96 % | WEIGHT: 179 LBS | SYSTOLIC BLOOD PRESSURE: 140 MMHG | HEART RATE: 78 BPM

## 2023-12-13 DIAGNOSIS — Z08 ENCOUNTER FOR FOLLOW-UP SURVEILLANCE OF MELANOMA: ICD-10-CM

## 2023-12-13 DIAGNOSIS — C43.30 MALIGNANT MELANOMA OF FACE (HCC): Primary | ICD-10-CM

## 2023-12-13 DIAGNOSIS — Z85.820 ENCOUNTER FOR FOLLOW-UP SURVEILLANCE OF MELANOMA: ICD-10-CM

## 2023-12-13 PROCEDURE — 99213 OFFICE O/P EST LOW 20 MIN: CPT | Performed by: INTERNAL MEDICINE

## 2023-12-13 NOTE — LETTER
January 21, 2024     Naomie Wilson, DO  [address] [facility]  1st Floor  [place] [address]    Patient: Sarah Thompson   YOB: 1955   Date of Visit: 12/13/2023       Dear Dr. Wilson:    Thank you for referring Sarah Thompson to me for evaluation. Below are my notes for this consultation.    If you have questions, please do not hesitate to call me. I look forward to following your patient along with you.         Sincerely,        Juliet Godfrey MD        CC: No Recipients    Juliet Godfrey MD  1/21/2024 10:56 PM  Sign when Signing Visit  [facility] [address] [address]  [phone]  [phone]     Date of Visit: 12/13/2023  Name: Sarah Thompson   YOB: 1955        Subjective    VISIT DIAGNOSIS:  Diagnoses and all orders for this visit:    Malignant melanoma of face (HCC)  -     CBC and differential; Future  -     Comprehensive metabolic panel; Future  -     LD,Blood; Future    Encounter for follow-up surveillance of melanoma        Oncology History   Malignant melanoma of face (HCC)   2/20/2023 -  Cancer Staged    Staging form: Melanoma of the Skin, AJCC 8th Edition  - Clinical stage from 2/20/2023: Stage IA (cT1a, cN0, cM0) - Signed by Juliet Godfrey MD on 6/8/2023 6/8/2023 Initial Diagnosis    Malignant melanoma of face (HCC)        Cancer Staging   Malignant melanoma of face (HCC)  Staging form: Melanoma of the Skin, AJCC 8th Edition  - Clinical stage from 2/20/2023: Stage IA (cT1a, cN0, cM0) - Signed by Juliet Godfrey MD on 6/8/2023          HISTORY OF PRESENT ILLNESS: Sarah Thompson is a 68 y.o. female  who has stage Ia melanoma here for continued monitoring, follow-up.    She is doing well.  Feels okay.  No issues concerns or complaints related to skin lesions.  Continues to follow with dermatology.  Denies any new, changing, concerning skin lesions.  Denies any  lymphadenopathy.      REVIEW OF SYSTEMS:  Review of Systems   Constitutional:  Negative for appetite change, fatigue, fever and unexpected weight change.   HENT:   Negative for lump/mass, trouble swallowing and voice change.    Eyes:  Negative for icterus.   Respiratory:  Negative for cough, shortness of breath and wheezing.    Cardiovascular:  Negative for leg swelling.   Gastrointestinal:  Negative for abdominal pain, constipation, diarrhea, nausea and vomiting.   Genitourinary:  Negative for difficulty urinating and hematuria.    Musculoskeletal:  Negative for arthralgias, gait problem and myalgias.   Skin:  Negative for itching and rash.        No new, changing, or concerning lesions.   Neurological:  Negative for extremity weakness, gait problem, headaches, light-headedness and numbness.   Hematological:  Negative for adenopathy.        MEDICATIONS:    Current Outpatient Medications:   •  albuterol (PROVENTIL HFA,VENTOLIN HFA) 90 mcg/act inhaler, Inhale 1 puff every 4 (four) hours as needed , Disp: , Rfl:   •  cycloSPORINE (RESTASIS) 0.05 % ophthalmic emulsion, Apply 0.05 % to eye 2 (two) times a day, Disp: , Rfl:   •  Homeopathic Products (SIMILASAN DRY EYE RELIEF) SOLN, Apply to eye if needed, Disp: , Rfl:   •  Metoprolol Tartrate 37.5 MG TABS, Take 1 tablet by mouth 2 (two) times a day, Disp: , Rfl:   •  Multiple Vitamins-Minerals (MULTIVITAMIN ADULT PO), 2 (two) times a day , Disp: , Rfl:   •  omeprazole (PriLOSEC) 20 mg delayed release capsule, Take 20 mg by mouth daily, Disp: , Rfl:   •  Digestive Enzymes (DIGESTIVE ENZYME ULTRA PO), Use daily  (Patient not taking: Reported on 10/18/2023), Disp: , Rfl:   •  Loratadine 10 MG CAPS, Take 10 mg by mouth as needed (Patient not taking: Reported on 10/18/2023), Disp: , Rfl:      ALLERGIES:  Allergies   Allergen Reactions   • Bee Venom      Other reaction(s): fatal-hospitalized as child   • Latex    • Mold Extract [Trichophyton]    • Sulfa Antibiotics          ACTIVE PROBLEMS:  Patient Active Problem List   Diagnosis   • Multiple sclerosis (HCC)   • Postconcussion syndrome   • Hypomania (HCC)   • Hyperverbal speech   • Bipolar affective disorder, current episode hypomanic (HCC)   • CURT (obstructive sleep apnea)   • Snoring   • Nocturnal hypoxemia   • Simple febrile convulsions (HCC)   • Neck pain   • Memory loss   • Malignant melanoma of face (HCC)          PAST MEDICAL HISTORY:   Past Medical History:   Diagnosis Date   • Asthma    • Hypertension    • Melanoma (HCC) 02/20/2023    Left upper cheek   • Multiple sclerosis (HCC)    • Post concussive syndrome    • Psychiatric disorder         PAST SURGICAL HISTORY:  Past Surgical History:   Procedure Laterality Date   • CATARACT EXTRACTION Left 12/27/2022   • MOHS SURGERY  03/28/2023    Left upper cheek-Dr. Mamadou Avelar        SOCIAL HISTORY:  Social History     Socioeconomic History   • Marital status: Single     Spouse name: None   • Number of children: None   • Years of education: None   • Highest education level: None   Occupational History   • None   Tobacco Use   • Smoking status: Never   • Smokeless tobacco: Never   Vaping Use   • Vaping status: Never Used   Substance and Sexual Activity   • Alcohol use: Yes     Comment: very infrequent   • Drug use: No   • Sexual activity: None   Other Topics Concern   • None   Social History Narrative   • None     Social Determinants of Health     Financial Resource Strain: Not on file   Food Insecurity: Not on file   Transportation Needs: Not on file   Physical Activity: Not on file   Stress: Not on file   Social Connections: Not on file   Intimate Partner Violence: Not on file   Housing Stability: Not on file        FAMILY HISTORY:  Family History   Problem Relation Age of Onset   • Heart failure Mother    • Pneumonia Mother    • Heart failure Father    • Muscular dystrophy Father    • Pneumonia Father            Objective    PHYSICAL EXAMINATION:   Blood pressure 140/86, pulse  78, temperature 98.6 °F (37 °C), temperature source Temporal, resp. rate 18, height 5' (1.524 m), weight 81.2 kg (179 lb), SpO2 96%.     Pain Score: 0-No pain     ECOG Performance Status      Flowsheet Row Most Recent Value   ECOG Performance Status 0 - Fully active, able to carry on all pre-disease performance without restriction               Physical Exam  Constitutional:       General: She is not in acute distress.     Appearance: Normal appearance. She is not ill-appearing or toxic-appearing.   HENT:      Head: Normocephalic and atraumatic.      Right Ear: External ear normal.      Left Ear: External ear normal.      Nose: Nose normal.      Mouth/Throat:      Mouth: Mucous membranes are moist.      Pharynx: Oropharynx is clear.   Eyes:      General: No scleral icterus.        Right eye: No discharge.         Left eye: No discharge.      Conjunctiva/sclera: Conjunctivae normal.   Cardiovascular:      Rate and Rhythm: Normal rate and regular rhythm.      Pulses: Normal pulses.      Heart sounds: No murmur heard.     No friction rub. No gallop.   Pulmonary:      Effort: Pulmonary effort is normal. No respiratory distress.      Breath sounds: Normal breath sounds. No wheezing or rales.   Abdominal:      General: Bowel sounds are normal. There is no distension.      Palpations: There is no mass.      Tenderness: There is no abdominal tenderness. There is no rebound.   Musculoskeletal:         General: No swelling or tenderness.      Cervical back: Normal range of motion. No rigidity.      Right lower leg: No edema.      Left lower leg: No edema.   Lymphadenopathy:      Head:      Right side of head: No submandibular, preauricular or posterior auricular adenopathy.      Left side of head: No submandibular, preauricular or posterior auricular adenopathy.      Cervical: No cervical adenopathy.      Right cervical: No superficial or posterior cervical adenopathy.     Left cervical: No superficial or posterior cervical  adenopathy.      Upper Body:      Right upper body: No supraclavicular or axillary adenopathy.      Left upper body: No supraclavicular or axillary adenopathy.      Lower Body: No right inguinal adenopathy. No left inguinal adenopathy.   Skin:     General: Skin is warm.      Coloration: Skin is not jaundiced.      Findings: No lesion or rash.      Comments: Well healed surgical scar.  No evidence of recurrence at primary site.   Neurological:      General: No focal deficit present.      Mental Status: She is alert and oriented to person, place, and time.      Cranial Nerves: No cranial nerve deficit.      Motor: No weakness.      Gait: Gait normal.   Psychiatric:         Mood and Affect: Mood normal.         Behavior: Behavior normal.         Thought Content: Thought content normal.         Judgment: Judgment normal.         I reviewed lab data in the chart.    WBC   Date Value Ref Range Status   12/09/2023 4.10 (L) 4.31 - 10.16 Thousand/uL Final   08/10/2019 4.69 4.31 - 10.16 Thousand/uL Final   01/14/2019 5.83 4.31 - 10.16 Thousand/uL Final   10/31/2015 4.00 (L) 4.31 - 10.16 Thousand/uL Final   09/11/2015 4.83 4.31 - 10.16 Thousand/uL Final   06/24/2015 5.54 4.31 - 10.16 Thousand/uL Final     Hemoglobin   Date Value Ref Range Status   12/09/2023 13.4 11.5 - 15.4 g/dL Final   08/10/2019 13.3 11.5 - 15.4 g/dL Final   01/14/2019 13.0 11.5 - 15.4 g/dL Final   10/31/2015 14.2 11.5 - 15.4 g/dL Final   09/11/2015 13.8 11.5 - 15.4 g/dL Final   06/24/2015 13.4 11.5 - 15.4 g/dL Final     Platelets   Date Value Ref Range Status   12/09/2023 226 149 - 390 Thousands/uL Final   08/10/2019 213 149 - 390 Thousands/uL Final   01/14/2019 207 149 - 390 Thousands/uL Final   10/31/2015 212 149 - 390 Thousand/uL Final   09/11/2015 214 149 - 390 Thousand/uL Final   06/24/2015 204 149 - 390 Thousand/uL Final     MCV   Date Value Ref Range Status   12/09/2023 96 82 - 98 fL Final   08/10/2019 98 82 - 98 fL Final   01/14/2019 98 82 - 98 fL  Final   10/31/2015 94 82 - 98 fL Final   09/11/2015 95 82 - 98 fL Final   06/24/2015 97 82 - 98 fL Final      Sodium   Date Value Ref Range Status   10/31/2015 142 136 - 145 mmol/L Final   09/11/2015 141 136 - 145 mmol/L Final   11/17/2014 139 135 - 145 mmol/L Final     Potassium   Date Value Ref Range Status   12/09/2023 4.6 3.5 - 5.3 mmol/L Final   03/09/2020 3.9 3.5 - 5.3 mmol/L Final   08/10/2019 4.1 3.5 - 5.3 mmol/L Final   10/31/2015 4.8 3.5 - 5.3 mmol/L Final   09/11/2015 3.7 3.5 - 5.3 mmol/L Final   11/17/2014 4.5 3.5 - 5.0 mmol/L Final     Chloride   Date Value Ref Range Status   12/09/2023 105 96 - 108 mmol/L Final   03/09/2020 106 100 - 108 mmol/L Final   08/10/2019 105 100 - 108 mmol/L Final   10/31/2015 106 100 - 108 mmol/L Final   09/11/2015 103 98 - 108 mmol/L Final   11/17/2014 103 100 - 108 mmol/L Final     CO2   Date Value Ref Range Status   12/09/2023 30 21 - 32 mmol/L Final   03/09/2020 29 21 - 32 mmol/L Final   08/10/2019 29 21 - 32 mmol/L Final   10/31/2015 29 21.0 - 32.0 mmol/L Final   09/11/2015 31.2 21.0 - 32.0 mmol/L Final   11/17/2014 31 25 - 33 mmol/L Final     BUN   Date Value Ref Range Status   12/09/2023 16 5 - 25 mg/dL Final   02/08/2021 15 5 - 25 mg/dL Final   03/09/2020 19 5 - 25 mg/dL Final   10/31/2015 16 5 - 25 mg/dL Final   09/11/2015 20 5 - 25 mg/dL Final   11/17/2014 14 10 - 26 mg/dL Final     Creatinine   Date Value Ref Range Status   12/09/2023 0.69 0.60 - 1.30 mg/dL Final     Comment:     Standardized to IDMS reference method   02/08/2021 0.81 0.60 - 1.30 mg/dL Final     Comment:     Standardized to IDMS reference method   03/09/2020 0.84 0.60 - 1.30 mg/dL Final     Comment:     Standardized to IDMS reference method   10/31/2015 0.70 0.60 - 1.30 mg/dL Final     Comment:     Standardized to IDMS reference method   09/11/2015 0.68 0.60 - 1.30 mg/dL Final     Comment:     Standardized to IDMS reference method   11/17/2014 0.55 (L) 0.60 - 1.30 mg/dL Final     Comment:      Standardized to IDMS reference method     Glucose   Date Value Ref Range Status   04/15/2019 115 65 - 140 mg/dL Final     Comment:       If the patient is fasting, the ADA then defines impaired fasting glucose as > 100 mg/dL and diabetes as > or equal to 123 mg/dL.  Specimen collection should occur prior to Sulfasalazine administration due to the potential for falsely depressed results. Specimen collection should occur prior to Sulfapyridine administration due to the potential for falsely elevated results.   01/28/2017 104 65 - 140 mg/dL Final     Comment:     If the patient is fasting, the ADA then defines impaired fasting glucose as > 100 mg/dL and diabetes as > or equal to 123 mg/dL.   08/06/2016 95 65 - 140 mg/dL Final     Comment:     If the patient is fasting, the ADA then defines impaired fasting glucose as > 100 mg/dL and diabetes as > or equal to 123 mg/dL.     Calcium   Date Value Ref Range Status   12/09/2023 9.4 8.4 - 10.2 mg/dL Final   03/09/2020 9.5 8.3 - 10.1 mg/dL Final   08/10/2019 9.2 8.3 - 10.1 mg/dL Final   10/31/2015 8.9 8.3 - 10.1 mg/dL Final   09/11/2015 9.2 8.3 - 10.1 mg/dL Final   11/17/2014 9.4 8.3 - 10.1 mg/dL Final     Total Protein   Date Value Ref Range Status   10/31/2015 7.1 6.4 - 8.2 g/dL Final   09/11/2015 7.8 6.4 - 8.2 g/dL Final   11/17/2014 7.2 6.4 - 8.2 g/dL Final     Albumin   Date Value Ref Range Status   12/09/2023 4.2 3.5 - 5.0 g/dL Final   08/10/2019 3.7 3.5 - 5.0 g/dL Final   04/15/2019 3.8 3.5 - 5.0 g/dL Final   10/31/2015 3.8 3.5 - 5.0 g/dL Final   09/11/2015 4.2 3.5 - 5.0 g/dL Final   11/17/2014 4.2 3.5 - 5.0 g/dL Final     Total Bilirubin   Date Value Ref Range Status   12/09/2023 0.66 0.20 - 1.00 mg/dL Final     Comment:     Use of this assay is not recommended for patients undergoing treatment with eltrombopag due to the potential for falsely elevated results.  N-acetyl-p-benzoquinone imine (metabolite of Acetaminophen) will generate erroneously low results in  "samples for patients that have taken an overdose of Acetaminophen.   08/10/2019 0.83 0.20 - 1.00 mg/dL Final   04/15/2019 0.40 0.20 - 1.00 mg/dL Final     Alkaline Phosphatase   Date Value Ref Range Status   12/09/2023 62 34 - 104 U/L Final   08/10/2019 67 46 - 116 U/L Final   04/15/2019 75 46 - 116 U/L Final   10/31/2015 76 46 - 116 U/L Final   09/11/2015 73 46 - 116 U/L Final   11/17/2014 96 50 - 136 U/L Final     AST   Date Value Ref Range Status   12/09/2023 20 13 - 39 U/L Final   08/10/2019 36 5 - 45 U/L Final     Comment:       Specimen collection should occur prior to Sulfasalazine administration due to the potential for falsely depressed results.    04/15/2019 26 5 - 45 U/L Final     Comment:       Specimen collection should occur prior to Sulfasalazine administration due to the potential for falsely depressed results.    10/31/2015 19 5 - 45 U/L Final   09/11/2015 20 5 - 45 U/L Final   11/17/2014 19 7 - 40 U/L Final     ALT   Date Value Ref Range Status   12/09/2023 20 7 - 52 U/L Final     Comment:     Specimen collection should occur prior to Sulfasalazine administration due to the potential for falsely depressed results.    08/10/2019 63 12 - 78 U/L Final     Comment:       Specimen collection should occur prior to Sulfasalazine and/or Sulfapyridine administration due to the potential for falsely depressed results.    04/15/2019 65 12 - 78 U/L Final     Comment:       Specimen collection should occur prior to Sulfasalazine administration due to the potential for falsely depressed results.    10/31/2015 41 12 - 78 U/L Final   09/11/2015 32 12 - 78 U/L Final   11/17/2014 25 6 - 78 U/L Final     Comment:     New Reference Range      LD   Date Value Ref Range Status   12/09/2023 159 140 - 271 U/L Final     No results found for: \"TSH\"  No results found for: \"D6XCLWR\"   No results found for: \"FREET4\"      RECENT IMAGING:  No results found.          Assessment/Plan  Ms. Thompson is a 68-year-old female with stage " Ia melanoma here for continued monitoring, follow-up and surveillance.    1. Malignant melanoma of face (HCC)  2. Encounter for follow-up surveillance of melanoma  She is doing well with no clinical evidence of melanoma recurrence or metastasis.  Labs reviewed and okay.  She will continue to follow-up with dermatology.  She knows to continue to monitor for any new skin lesions or any changes in existing lesions.  She knows to call with any issues or concerns prior to her next visit.  She will follow-up in 6 months for return visit.  Orders placed and prescription provided for blood work to be done at that time.    - CBC and differential; Future  - Comprehensive metabolic panel; Future  - LD,Blood; Future        Return in about 6 months (around 6/13/2024) for Office Visit, labs.     Juliet Godfrey MD, PhD

## 2024-01-22 NOTE — PROGRESS NOTES
[facility]  [address] [facility] [address]  [phone]     Date of Visit: 12/13/2023  Name: Sarah Thompson   YOB: 1955        Subjective    VISIT DIAGNOSIS:  Diagnoses and all orders for this visit:    Malignant melanoma of face (HCC)  -     CBC and differential; Future  -     Comprehensive metabolic panel; Future  -     LD,Blood; Future    Encounter for follow-up surveillance of melanoma        Oncology History   Malignant melanoma of face (HCC)   2/20/2023 -  Cancer Staged    Staging form: Melanoma of the Skin, AJCC 8th Edition  - Clinical stage from 2/20/2023: Stage IA (cT1a, cN0, cM0) - Signed by Juliet Godfrey MD on 6/8/2023 6/8/2023 Initial Diagnosis    Malignant melanoma of face (HCC)        Cancer Staging   Malignant melanoma of face (HCC)  Staging form: Melanoma of the Skin, AJCC 8th Edition  - Clinical stage from 2/20/2023: Stage IA (cT1a, cN0, cM0) - Signed by Juliet Godfrey MD on 6/8/2023          HISTORY OF PRESENT ILLNESS: Sarah Thompson is a 68 y.o. female  who has stage Ia melanoma here for continued monitoring, follow-up.    She is doing well.  Feels okay.  No issues concerns or complaints related to skin lesions.  Continues to follow with dermatology.  Denies any new, changing, concerning skin lesions.  Denies any lymphadenopathy.      REVIEW OF SYSTEMS:  Review of Systems   Constitutional:  Negative for appetite change, fatigue, fever and unexpected weight change.   HENT:   Negative for lump/mass, trouble swallowing and voice change.    Eyes:  Negative for icterus.   Respiratory:  Negative for cough, shortness of breath and wheezing.    Cardiovascular:  Negative for leg swelling.   Gastrointestinal:  Negative for abdominal pain, constipation, diarrhea, nausea and vomiting.   Genitourinary:  Negative for difficulty urinating and hematuria.    Musculoskeletal:  Negative for arthralgias, gait problem and  myalgias.   Skin:  Negative for itching and rash.        No new, changing, or concerning lesions.   Neurological:  Negative for extremity weakness, gait problem, headaches, light-headedness and numbness.   Hematological:  Negative for adenopathy.        MEDICATIONS:    Current Outpatient Medications:     albuterol (PROVENTIL HFA,VENTOLIN HFA) 90 mcg/act inhaler, Inhale 1 puff every 4 (four) hours as needed , Disp: , Rfl:     cycloSPORINE (RESTASIS) 0.05 % ophthalmic emulsion, Apply 0.05 % to eye 2 (two) times a day, Disp: , Rfl:     Homeopathic Products (SIMILASAN DRY EYE RELIEF) SOLN, Apply to eye if needed, Disp: , Rfl:     Metoprolol Tartrate 37.5 MG TABS, Take 1 tablet by mouth 2 (two) times a day, Disp: , Rfl:     Multiple Vitamins-Minerals (MULTIVITAMIN ADULT PO), 2 (two) times a day , Disp: , Rfl:     omeprazole (PriLOSEC) 20 mg delayed release capsule, Take 20 mg by mouth daily, Disp: , Rfl:     Digestive Enzymes (DIGESTIVE ENZYME ULTRA PO), Use daily  (Patient not taking: Reported on 10/18/2023), Disp: , Rfl:     Loratadine 10 MG CAPS, Take 10 mg by mouth as needed (Patient not taking: Reported on 10/18/2023), Disp: , Rfl:      ALLERGIES:  Allergies   Allergen Reactions    Bee Venom      Other reaction(s): fatal-hospitalized as child    Latex     Mold Extract [Trichophyton]     Sulfa Antibiotics         ACTIVE PROBLEMS:  Patient Active Problem List   Diagnosis    Multiple sclerosis (HCC)    Postconcussion syndrome    Hypomania (HCC)    Hyperverbal speech    Bipolar affective disorder, current episode hypomanic (HCC)    CURT (obstructive sleep apnea)    Snoring    Nocturnal hypoxemia    Simple febrile convulsions (HCC)    Neck pain    Memory loss    Malignant melanoma of face (HCC)          PAST MEDICAL HISTORY:   Past Medical History:   Diagnosis Date    Asthma     Hypertension     Melanoma (HCC) 02/20/2023    Left upper cheek    Multiple sclerosis (HCC)     Post concussive syndrome     Psychiatric disorder          PAST SURGICAL HISTORY:  Past Surgical History:   Procedure Laterality Date    CATARACT EXTRACTION Left 12/27/2022    MOHS SURGERY  03/28/2023    Left upper cheek-Dr. Mamadou Avelar        SOCIAL HISTORY:  Social History     Socioeconomic History    Marital status: Single     Spouse name: None    Number of children: None    Years of education: None    Highest education level: None   Occupational History    None   Tobacco Use    Smoking status: Never    Smokeless tobacco: Never   Vaping Use    Vaping status: Never Used   Substance and Sexual Activity    Alcohol use: Yes     Comment: very infrequent    Drug use: No    Sexual activity: None   Other Topics Concern    None   Social History Narrative    None     Social Determinants of Health     Financial Resource Strain: Not on file   Food Insecurity: Not on file   Transportation Needs: Not on file   Physical Activity: Not on file   Stress: Not on file   Social Connections: Not on file   Intimate Partner Violence: Not on file   Housing Stability: Not on file        FAMILY HISTORY:  Family History   Problem Relation Age of Onset    Heart failure Mother     Pneumonia Mother     Heart failure Father     Muscular dystrophy Father     Pneumonia Father            Objective    PHYSICAL EXAMINATION:   Blood pressure 140/86, pulse 78, temperature 98.6 °F (37 °C), temperature source Temporal, resp. rate 18, height 5' (1.524 m), weight 81.2 kg (179 lb), SpO2 96%.     Pain Score: 0-No pain     ECOG Performance Status      Flowsheet Row Most Recent Value   ECOG Performance Status 0 - Fully active, able to carry on all pre-disease performance without restriction               Physical Exam  Constitutional:       General: She is not in acute distress.     Appearance: Normal appearance. She is not ill-appearing or toxic-appearing.   HENT:      Head: Normocephalic and atraumatic.      Right Ear: External ear normal.      Left Ear: External ear normal.      Nose: Nose normal.       Mouth/Throat:      Mouth: Mucous membranes are moist.      Pharynx: Oropharynx is clear.   Eyes:      General: No scleral icterus.        Right eye: No discharge.         Left eye: No discharge.      Conjunctiva/sclera: Conjunctivae normal.   Cardiovascular:      Rate and Rhythm: Normal rate and regular rhythm.      Pulses: Normal pulses.      Heart sounds: No murmur heard.     No friction rub. No gallop.   Pulmonary:      Effort: Pulmonary effort is normal. No respiratory distress.      Breath sounds: Normal breath sounds. No wheezing or rales.   Abdominal:      General: Bowel sounds are normal. There is no distension.      Palpations: There is no mass.      Tenderness: There is no abdominal tenderness. There is no rebound.   Musculoskeletal:         General: No swelling or tenderness.      Cervical back: Normal range of motion. No rigidity.      Right lower leg: No edema.      Left lower leg: No edema.   Lymphadenopathy:      Head:      Right side of head: No submandibular, preauricular or posterior auricular adenopathy.      Left side of head: No submandibular, preauricular or posterior auricular adenopathy.      Cervical: No cervical adenopathy.      Right cervical: No superficial or posterior cervical adenopathy.     Left cervical: No superficial or posterior cervical adenopathy.      Upper Body:      Right upper body: No supraclavicular or axillary adenopathy.      Left upper body: No supraclavicular or axillary adenopathy.      Lower Body: No right inguinal adenopathy. No left inguinal adenopathy.   Skin:     General: Skin is warm.      Coloration: Skin is not jaundiced.      Findings: No lesion or rash.      Comments: Well healed surgical scar.  No evidence of recurrence at primary site.   Neurological:      General: No focal deficit present.      Mental Status: She is alert and oriented to person, place, and time.      Cranial Nerves: No cranial nerve deficit.      Motor: No weakness.      Gait: Gait  normal.   Psychiatric:         Mood and Affect: Mood normal.         Behavior: Behavior normal.         Thought Content: Thought content normal.         Judgment: Judgment normal.         I reviewed lab data in the chart.    WBC   Date Value Ref Range Status   12/09/2023 4.10 (L) 4.31 - 10.16 Thousand/uL Final   08/10/2019 4.69 4.31 - 10.16 Thousand/uL Final   01/14/2019 5.83 4.31 - 10.16 Thousand/uL Final   10/31/2015 4.00 (L) 4.31 - 10.16 Thousand/uL Final   09/11/2015 4.83 4.31 - 10.16 Thousand/uL Final   06/24/2015 5.54 4.31 - 10.16 Thousand/uL Final     Hemoglobin   Date Value Ref Range Status   12/09/2023 13.4 11.5 - 15.4 g/dL Final   08/10/2019 13.3 11.5 - 15.4 g/dL Final   01/14/2019 13.0 11.5 - 15.4 g/dL Final   10/31/2015 14.2 11.5 - 15.4 g/dL Final   09/11/2015 13.8 11.5 - 15.4 g/dL Final   06/24/2015 13.4 11.5 - 15.4 g/dL Final     Platelets   Date Value Ref Range Status   12/09/2023 226 149 - 390 Thousands/uL Final   08/10/2019 213 149 - 390 Thousands/uL Final   01/14/2019 207 149 - 390 Thousands/uL Final   10/31/2015 212 149 - 390 Thousand/uL Final   09/11/2015 214 149 - 390 Thousand/uL Final   06/24/2015 204 149 - 390 Thousand/uL Final     MCV   Date Value Ref Range Status   12/09/2023 96 82 - 98 fL Final   08/10/2019 98 82 - 98 fL Final   01/14/2019 98 82 - 98 fL Final   10/31/2015 94 82 - 98 fL Final   09/11/2015 95 82 - 98 fL Final   06/24/2015 97 82 - 98 fL Final      Sodium   Date Value Ref Range Status   10/31/2015 142 136 - 145 mmol/L Final   09/11/2015 141 136 - 145 mmol/L Final   11/17/2014 139 135 - 145 mmol/L Final     Potassium   Date Value Ref Range Status   12/09/2023 4.6 3.5 - 5.3 mmol/L Final   03/09/2020 3.9 3.5 - 5.3 mmol/L Final   08/10/2019 4.1 3.5 - 5.3 mmol/L Final   10/31/2015 4.8 3.5 - 5.3 mmol/L Final   09/11/2015 3.7 3.5 - 5.3 mmol/L Final   11/17/2014 4.5 3.5 - 5.0 mmol/L Final     Chloride   Date Value Ref Range Status   12/09/2023 105 96 - 108 mmol/L Final   03/09/2020 106  100 - 108 mmol/L Final   08/10/2019 105 100 - 108 mmol/L Final   10/31/2015 106 100 - 108 mmol/L Final   09/11/2015 103 98 - 108 mmol/L Final   11/17/2014 103 100 - 108 mmol/L Final     CO2   Date Value Ref Range Status   12/09/2023 30 21 - 32 mmol/L Final   03/09/2020 29 21 - 32 mmol/L Final   08/10/2019 29 21 - 32 mmol/L Final   10/31/2015 29 21.0 - 32.0 mmol/L Final   09/11/2015 31.2 21.0 - 32.0 mmol/L Final   11/17/2014 31 25 - 33 mmol/L Final     BUN   Date Value Ref Range Status   12/09/2023 16 5 - 25 mg/dL Final   02/08/2021 15 5 - 25 mg/dL Final   03/09/2020 19 5 - 25 mg/dL Final   10/31/2015 16 5 - 25 mg/dL Final   09/11/2015 20 5 - 25 mg/dL Final   11/17/2014 14 10 - 26 mg/dL Final     Creatinine   Date Value Ref Range Status   12/09/2023 0.69 0.60 - 1.30 mg/dL Final     Comment:     Standardized to IDMS reference method   02/08/2021 0.81 0.60 - 1.30 mg/dL Final     Comment:     Standardized to IDMS reference method   03/09/2020 0.84 0.60 - 1.30 mg/dL Final     Comment:     Standardized to IDMS reference method   10/31/2015 0.70 0.60 - 1.30 mg/dL Final     Comment:     Standardized to IDMS reference method   09/11/2015 0.68 0.60 - 1.30 mg/dL Final     Comment:     Standardized to IDMS reference method   11/17/2014 0.55 (L) 0.60 - 1.30 mg/dL Final     Comment:     Standardized to IDMS reference method     Glucose   Date Value Ref Range Status   04/15/2019 115 65 - 140 mg/dL Final     Comment:       If the patient is fasting, the ADA then defines impaired fasting glucose as > 100 mg/dL and diabetes as > or equal to 123 mg/dL.  Specimen collection should occur prior to Sulfasalazine administration due to the potential for falsely depressed results. Specimen collection should occur prior to Sulfapyridine administration due to the potential for falsely elevated results.   01/28/2017 104 65 - 140 mg/dL Final     Comment:     If the patient is fasting, the ADA then defines impaired fasting glucose as > 100 mg/dL  and diabetes as > or equal to 123 mg/dL.   08/06/2016 95 65 - 140 mg/dL Final     Comment:     If the patient is fasting, the ADA then defines impaired fasting glucose as > 100 mg/dL and diabetes as > or equal to 123 mg/dL.     Calcium   Date Value Ref Range Status   12/09/2023 9.4 8.4 - 10.2 mg/dL Final   03/09/2020 9.5 8.3 - 10.1 mg/dL Final   08/10/2019 9.2 8.3 - 10.1 mg/dL Final   10/31/2015 8.9 8.3 - 10.1 mg/dL Final   09/11/2015 9.2 8.3 - 10.1 mg/dL Final   11/17/2014 9.4 8.3 - 10.1 mg/dL Final     Total Protein   Date Value Ref Range Status   10/31/2015 7.1 6.4 - 8.2 g/dL Final   09/11/2015 7.8 6.4 - 8.2 g/dL Final   11/17/2014 7.2 6.4 - 8.2 g/dL Final     Albumin   Date Value Ref Range Status   12/09/2023 4.2 3.5 - 5.0 g/dL Final   08/10/2019 3.7 3.5 - 5.0 g/dL Final   04/15/2019 3.8 3.5 - 5.0 g/dL Final   10/31/2015 3.8 3.5 - 5.0 g/dL Final   09/11/2015 4.2 3.5 - 5.0 g/dL Final   11/17/2014 4.2 3.5 - 5.0 g/dL Final     Total Bilirubin   Date Value Ref Range Status   12/09/2023 0.66 0.20 - 1.00 mg/dL Final     Comment:     Use of this assay is not recommended for patients undergoing treatment with eltrombopag due to the potential for falsely elevated results.  N-acetyl-p-benzoquinone imine (metabolite of Acetaminophen) will generate erroneously low results in samples for patients that have taken an overdose of Acetaminophen.   08/10/2019 0.83 0.20 - 1.00 mg/dL Final   04/15/2019 0.40 0.20 - 1.00 mg/dL Final     Alkaline Phosphatase   Date Value Ref Range Status   12/09/2023 62 34 - 104 U/L Final   08/10/2019 67 46 - 116 U/L Final   04/15/2019 75 46 - 116 U/L Final   10/31/2015 76 46 - 116 U/L Final   09/11/2015 73 46 - 116 U/L Final   11/17/2014 96 50 - 136 U/L Final     AST   Date Value Ref Range Status   12/09/2023 20 13 - 39 U/L Final   08/10/2019 36 5 - 45 U/L Final     Comment:       Specimen collection should occur prior to Sulfasalazine administration due to the potential for falsely depressed  "results.    04/15/2019 26 5 - 45 U/L Final     Comment:       Specimen collection should occur prior to Sulfasalazine administration due to the potential for falsely depressed results.    10/31/2015 19 5 - 45 U/L Final   09/11/2015 20 5 - 45 U/L Final   11/17/2014 19 7 - 40 U/L Final     ALT   Date Value Ref Range Status   12/09/2023 20 7 - 52 U/L Final     Comment:     Specimen collection should occur prior to Sulfasalazine administration due to the potential for falsely depressed results.    08/10/2019 63 12 - 78 U/L Final     Comment:       Specimen collection should occur prior to Sulfasalazine and/or Sulfapyridine administration due to the potential for falsely depressed results.    04/15/2019 65 12 - 78 U/L Final     Comment:       Specimen collection should occur prior to Sulfasalazine administration due to the potential for falsely depressed results.    10/31/2015 41 12 - 78 U/L Final   09/11/2015 32 12 - 78 U/L Final   11/17/2014 25 6 - 78 U/L Final     Comment:     New Reference Range      LD   Date Value Ref Range Status   12/09/2023 159 140 - 271 U/L Final     No results found for: \"TSH\"  No results found for: \"U5XQNZV\"   No results found for: \"FREET4\"      RECENT IMAGING:  No results found.          Assessment/Plan  Ms. Thompson is a 68-year-old female with stage Ia melanoma here for continued monitoring, follow-up and surveillance.    1. Malignant melanoma of face (HCC)  2. Encounter for follow-up surveillance of melanoma  She is doing well with no clinical evidence of melanoma recurrence or metastasis.  Labs reviewed and okay.  She will continue to follow-up with dermatology.  She knows to continue to monitor for any new skin lesions or any changes in existing lesions.  She knows to call with any issues or concerns prior to her next visit.  She will follow-up in 6 months for return visit.  Orders placed and prescription provided for blood work to be done at that time.    - CBC and differential; " Future  - Comprehensive metabolic panel; Future  - LD,Blood; Future        Return in about 6 months (around 6/13/2024) for Office Visit, labs.     Juliet Godfrey MD, PhD

## 2024-02-05 ENCOUNTER — OFFICE VISIT (OUTPATIENT)
Dept: SLEEP CENTER | Facility: CLINIC | Age: 69
End: 2024-02-05
Payer: COMMERCIAL

## 2024-02-05 VITALS
HEART RATE: 98 BPM | HEIGHT: 62 IN | DIASTOLIC BLOOD PRESSURE: 88 MMHG | WEIGHT: 174 LBS | OXYGEN SATURATION: 95 % | BODY MASS INDEX: 32.02 KG/M2 | SYSTOLIC BLOOD PRESSURE: 140 MMHG

## 2024-02-05 DIAGNOSIS — G47.33 OSA (OBSTRUCTIVE SLEEP APNEA): Primary | ICD-10-CM

## 2024-02-05 DIAGNOSIS — G35 MULTIPLE SCLEROSIS (HCC): ICD-10-CM

## 2024-02-05 DIAGNOSIS — Z91.09 ENVIRONMENTAL ALLERGIES: ICD-10-CM

## 2024-02-05 DIAGNOSIS — R41.3 MEMORY LOSS: ICD-10-CM

## 2024-02-05 DIAGNOSIS — E66.9 OBESITY (BMI 30-39.9): ICD-10-CM

## 2024-02-05 DIAGNOSIS — J45.20 MILD INTERMITTENT ASTHMA WITHOUT COMPLICATION: ICD-10-CM

## 2024-02-05 DIAGNOSIS — G47.09 OTHER INSOMNIA: ICD-10-CM

## 2024-02-05 DIAGNOSIS — I10 ESSENTIAL HYPERTENSION: ICD-10-CM

## 2024-02-05 DIAGNOSIS — G47.34 NOCTURNAL HYPOXEMIA: ICD-10-CM

## 2024-02-05 DIAGNOSIS — F31.0 BIPOLAR AFFECTIVE DISORDER, CURRENT EPISODE HYPOMANIC (HCC): ICD-10-CM

## 2024-02-05 PROCEDURE — 99204 OFFICE O/P NEW MOD 45 MIN: CPT | Performed by: INTERNAL MEDICINE

## 2024-02-05 RX ORDER — FLUTICASONE PROPIONATE 50 MCG
SPRAY, SUSPENSION (ML) NASAL
COMMUNITY
Start: 2023-11-16

## 2024-02-05 RX ORDER — ZOLPIDEM TARTRATE 5 MG/1
5 TABLET ORAL AS NEEDED
Qty: 1 TABLET | Refills: 0 | Status: SHIPPED | OUTPATIENT
Start: 2024-02-05 | End: 2024-02-06

## 2024-02-05 RX ORDER — AMLODIPINE BESYLATE 5 MG/1
5 TABLET ORAL DAILY
COMMUNITY
Start: 2023-11-01

## 2024-02-05 NOTE — PATIENT INSTRUCTIONS
What is CURT?   Obstructive sleep apnea is a common and serious sleep disorder that causes you to stop breathing during sleep. The airway repeatedly becomes blocked, limiting the amount of air that reaches your lungs. When this happens, you may snore loudly or making choking noises as you try to breathe. Your brain and body becomes oxygen deprived and you may wake up. This may happen a few times a night, or in more severe cases, several hundred times a night.   Sleep apnea can make you wake up in the morning feeling tired or unrefreshed even though you have had a full night of sleep. During the day, you may feel fatigued, have difficulty concentrating or you may even unintentionally fall asleep. This is because your body is waking up numerous times throughout the night, even though you might not be conscious of each awakening.  The lack of oxygen your body receives can have negative long-term consequences for your health. This includes:  High blood pressure  Heart disease  Irregular heart rhythms  Stroke  Pre-diabetes and diabetes  Depression  Testing  An objective evaluation of your sleep may be needed before your board certified sleep physician can make a diagnosis. Options include:   In-lab overnight sleep study  This type of sleep study requires you to stay overnight at a sleep center, in a bed that may resemble a hotel room. You will sleep with sensors hooked up to various parts of your body. These sensors record your brain waves, heartbeat, breathing and movement. An overnight sleep study also provides your doctor with the most complete information about your sleep. Learn more about an overnight sleep study.   Home sleep apnea test  Some patients with high risk factors for obstructive sleep apnea and no other medical disorders may be candidates for a home sleep apnea test. The testing equipment differs in that it is less complicated than what is used in an overnight sleep study. As such, does not give all the  data an in-lab will and if negative, may not mean you do not have the problem.  Treatment for sleep apnea includes using a continuous positive airway pressure (CPAP) machine to keep your airway open during sleep. A mask is placed over your nose and mouth, or just your nose. The mask is hooked to the CPAP machine that blows a gentle stream of air into the mask when you breathe. This helps keep your airway open so you can breathe more regularly. Extra oxygen may be given to you through the machine. You may be given a mouth device. It looks like a mouth guard or dental retainer and stops your tongue and mouth tissues from blocking your throat while you sleep. Surgery may be needed to remove extra tissues that block your mouth, throat, or nose.  Manage sleep apnea:   Do not smoke. Nicotine and other chemicals in cigarettes and cigars can cause lung damage. Ask your healthcare provider for information if you currently smoke and need help to quit. E-cigarettes or smokeless tobacco still contain nicotine. Talk to your healthcare provider before you use these products.  Do not drink alcohol or take sedative medicine before you go to sleep. Alcohol and sedatives can relax the muscles and tissues around your throat. This can block the airflow to your lungs.  Maintain a healthy weight. Excess tissue around your throat may restrict your breathing. Ask your healthcare provider for information if you need to lose weight.  Sleep on your side or use pillows designed to prevent sleep apnea. This prevents your tongue or other tissues from blocking your throat. You can also raise the head of your bed.  Driving Safety. Refrain from driving when drowsy.   Follow up with your healthcare provider as directed: Write down your questions so you remember to ask them during your visits.  Go to [employer]ASM website for more information: Sleepeducation.org  What is CURT?   Obstructive sleep apnea is a common and serious sleep disorder that causes you to  stop breathing during sleep. The airway repeatedly becomes blocked, limiting the amount of air that reaches your lungs. When this happens, you may snore loudly or making choking noises as you try to breathe. Your brain and body becomes oxygen deprived and you may wake up. This may happen a few times a night, or in more severe cases, several hundred times a night.   Sleep apnea can make you wake up in the morning feeling tired or unrefreshed even though you have had a full night of sleep. During the day, you may feel fatigued, have difficulty concentrating or you may even unintentionally fall asleep. This is because your body is waking up numerous times throughout the night, even though you might not be conscious of each awakening.  The lack of oxygen your body receives can have negative long-term consequences for your health. This includes:  High blood pressure  Heart disease  Irregular heart rhythms  Stroke  Pre-diabetes and diabetes  Depression  Testing  An objective evaluation of your sleep may be needed before your board certified sleep physician can make a diagnosis. Options include:   In-lab overnight sleep study  This type of sleep study requires you to stay overnight at a sleep center, in a bed that may resemble a hotel room. You will sleep with sensors hooked up to various parts of your body. These sensors record your brain waves, heartbeat, breathing and movement. An overnight sleep study also provides your doctor with the most complete information about your sleep. Learn more about an overnight sleep study.   Home sleep apnea test  Some patients with high risk factors for obstructive sleep apnea and no other medical disorders may be candidates for a home sleep apnea test. The testing equipment differs in that it is less complicated than what is used in an overnight sleep study. As such, does not give all the data an in-lab will and if negative, may not mean you do not have the problem.  Treatment for  sleep apnea includes using a continuous positive airway pressure (CPAP) machine to keep your airway open during sleep. A mask is placed over your nose and mouth, or just your nose. The mask is hooked to the CPAP machine that blows a gentle stream of air into the mask when you breathe. This helps keep your airway open so you can breathe more regularly. Extra oxygen may be given to you through the machine. You may be given a mouth device. It looks like a mouth guard or dental retainer and stops your tongue and mouth tissues from blocking your throat while you sleep. Surgery may be needed to remove extra tissues that block your mouth, throat, or nose.  Manage sleep apnea:   Do not smoke. Nicotine and other chemicals in cigarettes and cigars can cause lung damage. Ask your healthcare provider for information if you currently smoke and need help to quit. E-cigarettes or smokeless tobacco still contain nicotine. Talk to your healthcare provider before you use these products.  Do not drink alcohol or take sedative medicine before you go to sleep. Alcohol and sedatives can relax the muscles and tissues around your throat. This can block the airflow to your lungs.  Maintain a healthy weight. Excess tissue around your throat may restrict your breathing. Ask your healthcare provider for information if you need to lose weight.  Sleep on your side or use pillows designed to prevent sleep apnea. This prevents your tongue or other tissues from blocking your throat. You can also raise the head of your bed.  Driving Safety. Refrain from driving when drowsy.   Follow up with your healthcare provider as directed: Write down your questions so you remember to ask them during your visits.  Go to AASM website for more information: Sleepeducation.org      Nursing Support:  When: Monday through Friday 7A-5PM except holidays  Where: Our direct line is [phone].    If you are having a true emergency please call 911.  In the event that the  line is busy or it is after hours please leave a voice message and we will return your call.  Please speak clearly, leaving your full name, birth date, best number to reach you and the reason for your call.   Medication refills: We will need the name of the medication, the dosage, the ordering provider, whether you get a 30 or 90 day refill, and the pharmacy name and address.  Medications will be ordered by the provider only.  Nurses cannot call in prescriptions.  Please allow 7 days for medication refills.  Physician requested updates: If your provider requested that you call with an update after starting medication, please be ready to provide us the medication and dosage, what time you take your medication, the time you attempt to fall asleep, time you fall asleep, when you wake up, and what time you get out of bed.  Sleep Study Results: We will contact you with sleep study results and/or next steps after the physician has reviewed your testing.

## 2024-02-05 NOTE — PROGRESS NOTES
" Consultation - Sleep Center   Sarah Thompson  68 y.o. female  :1955  MRN:[id number]  DOS:2024    Physician Requesting Consult: Romeo Gutierrez MD             Reason for Consult : At your kind request I saw Sarah Thompson for initial sleep evaluation today.  She was diagnosed with obstructive sleep apnea in the past and prescribed CPAP.  She discontinued use because of intolerance.  She is now here at behest of her neurologist.    2019 HST: MATT 33 cordell of 77% and 27.9% of the study was spent with saturations below 90%. CPAP titration 2019: APAP 9-15cm recommended.     PFSH, Problem List, Medications & Allergies were reviewed in EMR.    Sarah  has a past medical history of Asthma, Hypertension, Melanoma (HCC) (2023), Multiple sclerosis (HCC), Post concussive syndrome, and Psychiatric disorder.      She has a current medication list which includes the following prescription(s): albuterol, amlodipine, fluticasone, similasan dry eye relief, metoprolol tartrate, multiple vitamin, omeprazole, zolpidem, cyclosporine, digestive enzymes, and loratadine.      HPI: She typically sleeps alone but been sharing a room has been told she has \"mild intermittent snoring \".  Sarah is un aware of Snore loudly, Stop breathing Other complaints: None. Restless Leg Syndrome: reports no suggestive symptoms.    Parasomnia: no features reported    Sleep Routine (averaged): Typical Bedtime: 12 AM- 2 AM.  Gets OOB: 7 AM. TIB:5-7 hrs.   Sleep latency:< 15 minutes; Sleep Interruptions: 1 to 2 times per night, not always sure of the cause and able to fall back asleep. Awakens: spontaneously  Upon awakening: is not always refreshed.[She estimates getting less than 5 hrs sleep.] Daytime Function:Sarah denies excessive daytime sleepiness no. She rated herself at Total score: 3 /24 on the [place] Sleepiness Scale.     Habits:   reports that she has never smoked. She has never used smokeless tobacco.;  reports current " "alcohol use.; Reports no history of drug use.;  E-Cigarette/Vaping    E-Cigarette Use  Never User; Caffeine use:limited; Exercise routine: none.    Occupation: Work Full Time   Family History: Negative for sleep disturbance.  ROS: Significant for weight has been stable.  Allergies and asthma controlled and she has not been needing inhalers.  She reports no cardiac symptoms.  She was diagnosed with bipolar in the past but states symptoms are presently controlled off medication.    EXAM:  /88   Pulse 98   Ht 5' 1.52\" (1.563 m)   Wt 78.9 kg (174 lb)   SpO2 95%   BMI 32.32 kg/m²    General: Well groomed female, well appearing, in no apparent distress.   Neurological: Alert and orientated; cooperative; Cranial nerves intact;    Psychiatric: Speech: Clear and coherent; normal mood, affect & thought   Skin: Warm and dry; Color& Hydration good; no facial rashes or lesions   HEENT:  Craniofacial anatomy: normal Sinuses: Non-tender. TMJ: Normal    Eyes: EOM's intact; conjunctiva/corneas clear   Ears: Appear normal     Nasal Airway: is patent Septum: Intact; Turbinates: Normal; Rhinorrhea: None  Mouth: Lips: Normal posture; Dentition:  ground down. Mucosa: Moist; Hard Palate:normal    Oropharryx: crowded and AP narrowing Tongue: Mallampati:Class IV and MobileSoft Palate:  redundant  Tonsils: absent  Neck:; neck Circumference: 15 \"; supple; no abnormal masses; Thyroid: Normal. Trachea: Central.    Lymph: No cervical or submandibular Lymhadenopathy  Heart: S1,S2 normal; RRR; no gallop; no murmur   Lungs: Respiratory Effort: Normal. Air entry good bilaterally.  No wheezes.  No rales  Abdomen: Obese, soft & non-tender    Extremities: No pedal edema.  No clubbing or cyanosis.    Musculoskeletal:  Motor normal; Gait: Normal.       IMPRESSION: Primary/Secondary Sleep Diagnoses (to Medical or Psych conditions) & Comorbidities   1. CURT (obstructive sleep apnea)  Ambulatory Referral to Sleep Medicine    Split Study      2. " "Nocturnal hypoxemia  Split Study      3. Other insomnia  zolpidem (AMBIEN) 5 mg tablet      4. Essential hypertension        5. Multiple sclerosis (HCC)        6. Memory loss        7. Bipolar affective disorder, current episode hypomanic (HCC)        8. Obesity (BMI 30-39.9)        9. Environmental allergies        10. Mild intermittent asthma without complication  Split Study           PLAN:   1. With respect to above conditions, comprehensive counseling provided on pathophysiology; effects on symptoms and comorbidities, diagnostic strategies & limitations; treatment options; risks or no treament; risks & benefits of the various therapeutic options; costs and insurance aspects.     2. I advised weight reduction, avoiding sleeping supine, using alcohol or sedating medications close to bed time and on safe driving practices.    3.  Repeat sleep testing is indicated and since patient is willing to re-try Positive airway pressure therapy, will be scheduled for a split study if indicated.    4.  Multi component Cognitive behavioral therapy for Insomnia undertaken - Sleep Restriction, Stimulus control, Relaxation techniques and Sleep hygiene were discussed.  5. Follow-up to be scheduled after testing initiation of therapy to review results, further details of treatment options and to adjust therapy.    Sincerely,      Authenticated electronically on 02/05/24   Board Certified Specialist     Portions of the record may have been created with voice recognition software. Occasional wrong word or \"sound a like\" substitutions may have occurred due to the inherent limitations of voice recognition software. There may also be notations and random deletions of words or characters from malfunctioning software. Read the chart carefully and recognize, using context, where substitutions/deletions have occurred.     "

## 2024-02-22 ENCOUNTER — HOSPITAL ENCOUNTER (OUTPATIENT)
Dept: SLEEP CENTER | Facility: CLINIC | Age: 69
Discharge: HOME | End: 2024-02-22
Payer: COMMERCIAL

## 2024-02-22 DIAGNOSIS — G47.33 OSA (OBSTRUCTIVE SLEEP APNEA): Primary | ICD-10-CM

## 2024-02-22 DIAGNOSIS — G47.33 OSA (OBSTRUCTIVE SLEEP APNEA): ICD-10-CM

## 2024-02-22 DIAGNOSIS — G47.34 NOCTURNAL HYPOXEMIA: ICD-10-CM

## 2024-02-22 DIAGNOSIS — J45.20 MILD INTERMITTENT ASTHMA WITHOUT COMPLICATION: ICD-10-CM

## 2024-02-22 PROCEDURE — 95811 POLYSOM 6/>YRS CPAP 4/> PARM: CPT

## 2024-02-23 DIAGNOSIS — G47.33 OSA (OBSTRUCTIVE SLEEP APNEA): Primary | ICD-10-CM

## 2024-02-23 NOTE — PROGRESS NOTES
Sleep Study Documentation    Pre-Sleep Study       Sleep testing procedure explained to patient:YES    Patient napped prior to study:NO    Caffeine:Dayshift worker after 12PM.  Caffeine use:NO    Alcohol:Dayshift workers after 5PM: Alcohol use:NO    Typical day for patient:YES       Study Documentation    Sleep Study Indications: titration study due to a previous diagnostic HST showing severe CURT    Sleep Study: Treatment   Optimal PAP pressure: 10cm  Leak:None  Snore:Eliminated  REM Obtained:yes  Supplemental O2: no    Minimum SaO2 86  Baseline SaO2 92  PAP mask choice medium ResMed AirFit F20  PAP mask type:full face  PAP pressure at which snoring was eliminated 9cm  Mode of Therapy:CPAP  ETCO2:No  CPAP changed to BiPAP:No      EKG abnormalities: no     EEG abnormalities: no    Were abnormal behaviors in sleep observed:NO    Is Total Sleep Study Recording Time < 2 hours: N/A    Is Total Sleep Study Recording Time > 2 hours but study is incomplete: N/A    Is Total Sleep Study Recording Time 6 hours or more but sleep was not obtained: NO        Post-Sleep Study    Medication used at bedtime or during sleep study:NO    Patient reports time it took to fall asleep:less than 20 minutes    Patient reports waking up during study:3 or more times.  Patient reports returning to sleep in greater than 30 minutes.    Patient reports sleeping 2 to 4 hours with dreaming.    Does the Patient feel this is a typical night of sleep:worse than usual    Patient rated sleepiness: Very sleepy or tired    PAP treatment:yes: Post PAP treatment patient reports feeling worse and  unsure

## 2024-02-27 ENCOUNTER — TELEPHONE (OUTPATIENT)
Dept: SLEEP CENTER | Facility: CLINIC | Age: 69
End: 2024-02-27

## 2024-02-27 NOTE — TELEPHONE ENCOUNTER
CPAP study resulted and CPAP Rx written.    Per 2/8/24 office note, patient was diagnosed with CURT in the past and prescribed CPAP but discontinued use because of intolerance.    Patient has Medicare and will require new diagnostic sleep study to re-qualify for CPAP.     Dr. Willard, please place order for HST or in-lab diagnostic study.

## 2024-02-28 DIAGNOSIS — G47.33 OSA (OBSTRUCTIVE SLEEP APNEA): Primary | ICD-10-CM

## 2024-03-01 NOTE — TELEPHONE ENCOUNTER
Spoke to the patient advised she needs Home sleep study for diagnosis of CURT.   Patient is really not sure if she wants to re initiate CPAP treatment she will take the weekend to decide if she wants to schedule the home study

## 2024-03-14 ENCOUNTER — HOSPITAL ENCOUNTER (OUTPATIENT)
Dept: SLEEP CENTER | Facility: CLINIC | Age: 69
Discharge: HOME | End: 2024-03-14
Payer: COMMERCIAL

## 2024-03-14 DIAGNOSIS — G47.33 OSA (OBSTRUCTIVE SLEEP APNEA): ICD-10-CM

## 2024-03-14 PROCEDURE — G0399 HOME SLEEP TEST/TYPE 3 PORTA: HCPCS

## 2024-03-14 NOTE — PROGRESS NOTES
Home Sleep Study Documentation    HOME STUDY DEVICE: Noxturnal no                                           Carol G3 yes      Pre-Sleep Home Study:    Set-up and instructions performed by: Macey    Technician performed demonstration for Patient: yes    Return demonstration performed by Patient: yes    Written instructions provided to Patient: yes    Patient signed consent form: yes        Post-Sleep Home Study:    Additional comments by Patient: pending    Home Sleep Study Failed:pending    Failure reason: pending    Reported or Detected: pending    Scored by: pending

## 2024-03-30 ENCOUNTER — TRANSCRIBE ORDERS (OUTPATIENT)
Dept: LAB | Facility: CLINIC | Age: 69
End: 2024-03-30

## 2024-03-30 ENCOUNTER — APPOINTMENT (OUTPATIENT)
Dept: LAB | Facility: CLINIC | Age: 69
End: 2024-03-30
Payer: COMMERCIAL

## 2024-03-30 DIAGNOSIS — E78.2 MIXED HYPERLIPIDEMIA: Primary | ICD-10-CM

## 2024-03-30 DIAGNOSIS — E78.2 MIXED HYPERLIPIDEMIA: ICD-10-CM

## 2024-03-30 LAB
CHOLEST SERPL-MCNC: 205 MG/DL
HDLC SERPL-MCNC: 57 MG/DL
LDLC SERPL CALC-MCNC: 128 MG/DL (ref 0–100)
NONHDLC SERPL-MCNC: 148 MG/DL
TRIGL SERPL-MCNC: 99 MG/DL

## 2024-03-30 PROCEDURE — 36415 COLL VENOUS BLD VENIPUNCTURE: CPT

## 2024-03-30 PROCEDURE — 80061 LIPID PANEL: CPT

## 2024-04-12 ENCOUNTER — TELEPHONE (OUTPATIENT)
Dept: SLEEP CENTER | Facility: CLINIC | Age: 69
End: 2024-04-12

## 2024-04-12 NOTE — TELEPHONE ENCOUNTER
Called patient and advised home study resulted and shows moderate sleep apnea.    Per chart, patient completed a CPAP study in February, at which time a CPAP was ordered but was denied by insurance as patient needed a new diagnostic study to qualify.    Advised patient that the CPAP Rx will be re-sent to [facility] and one of their representatives should reach out in 7-10 days to schedule a set up appointment in the office.  Provided patient with the phone number to [facility]'s sleep department([phone]) to call to follow up if not contacted by [facility].      Notified office staff via Teams DME chat to process CPAP Rx.     Patient has compliance follow up scheduled 7/17/24.  Added to wait list.

## 2024-04-15 ENCOUNTER — TELEPHONE (OUTPATIENT)
Dept: NEUROLOGY | Facility: CLINIC | Age: 69
End: 2024-04-15

## 2024-04-15 LAB
DME PARACHUTE DELIVERY DATE REQUESTED: NORMAL
DME PARACHUTE ITEM DESCRIPTION: NORMAL
DME PARACHUTE ORDER STATUS: NORMAL
DME PARACHUTE SUPPLIER NAME: NORMAL
DME PARACHUTE SUPPLIER PHONE: NORMAL

## 2024-04-18 ENCOUNTER — TELEPHONE (OUTPATIENT)
Dept: NEUROLOGY | Facility: CLINIC | Age: 69
End: 2024-04-18

## 2024-04-18 ENCOUNTER — OFFICE VISIT (OUTPATIENT)
Dept: NEUROLOGY | Facility: CLINIC | Age: 69
End: 2024-04-18
Payer: COMMERCIAL

## 2024-04-18 VITALS
DIASTOLIC BLOOD PRESSURE: 72 MMHG | WEIGHT: 176 LBS | HEART RATE: 53 BPM | HEIGHT: 62 IN | BODY MASS INDEX: 32.39 KG/M2 | TEMPERATURE: 97.9 F | SYSTOLIC BLOOD PRESSURE: 142 MMHG

## 2024-04-18 DIAGNOSIS — R00.1 BRADYCARDIA: ICD-10-CM

## 2024-04-18 DIAGNOSIS — G35 MULTIPLE SCLEROSIS (HCC): Primary | ICD-10-CM

## 2024-04-18 DIAGNOSIS — R41.3 MEMORY LOSS: ICD-10-CM

## 2024-04-18 PROCEDURE — 99214 OFFICE O/P EST MOD 30 MIN: CPT | Performed by: PSYCHIATRY & NEUROLOGY

## 2024-04-18 RX ORDER — METOPROLOL SUCCINATE 50 MG/1
50 TABLET, EXTENDED RELEASE ORAL DAILY
COMMUNITY
Start: 2024-04-02

## 2024-04-18 NOTE — ASSESSMENT & PLAN NOTE
Pt here for neuro follow up  Pt last seen in Oct  Since last visit, no recent infections  No recent hospitalizations  No falls or trips  No recent ms flares  Exam stable  Pt to call for any new sxs

## 2024-04-18 NOTE — TELEPHONE ENCOUNTER
Pt here today for neuro follow up for MS.  Pt notes she had sleep study done and supposed to be getting her cpap but has not received.  Pt asked for me to reach out to see about helping her to get her device.

## 2024-04-18 NOTE — ASSESSMENT & PLAN NOTE
Pt notes similar sxs to past  Pt notes ongoing chronic issues with processing and taking longer to double and triple check her work  Re reviewed neurocogntive eval from 9/26/23 with evidence of MCI  Likely multifactorial  Pt with known sleep apnea, following with dr franklin  In process of getting her cpap updated, seen by sleep med and awaiting device  Pt also seen by speech pathology, pt declinced further assistance after appt  No new issues with safety in home, cooking, work or driving

## 2024-04-18 NOTE — PROGRESS NOTES
"Patient ID: Sarah Thompson is a 68 y.o. female.    Assessment/Plan:    Multiple sclerosis (HCC)  Pt here for neuro follow up  Pt last seen in Oct  Since last visit, no recent infections  No recent hospitalizations  No falls or trips  No recent ms flares  Exam stable  Pt to call for any new sxs    Memory loss  Pt notes similar sxs to past  Pt notes ongoing chronic issues with processing and taking longer to double and triple check her work  Re reviewed neurocogntive eval from 9/26/23 with evidence of MCI  Likely multifactorial  Pt with known sleep apnea, following with dr franklin  In process of getting her cpap updated, seen by sleep med and awaiting device  Pt also seen by speech pathology, pt declinced further assistance after appt  No new issues with safety in home, cooking, work or driving    Bradycardia  Pt with HR today 51 and recheck 53  Pt has been recording her bp and hr for several months and showed me handwritten log  Pt with history of bradycardia  Pt unaware of slowing  Pt denies any syncope but does have occ dizziness  Rec follow up with pcp and showing her flow sheet as well       Diagnoses and all orders for this visit:    Multiple sclerosis (HCC)    Memory loss    Bradycardia    Other orders  -     metoprolol succinate (TOPROL-XL) 50 mg 24 hr tablet; Take 50 mg by mouth daily           Subjective:    HPI  Pt here today for neuro followup.  Pt last seen on 10/16/23.  Per last visit \"Since last visit, pt notes no new MS sxs.   Pt did follow up with dermatology for left cheek melanoma.  Pt notes close follow up with her dermatology team.  Pt notes issues at work about the same.  Pt notes taking longer to process and complete job duties.  Pt denies any new MS sxs.   Of note, pt biggest issue has been memory and staying on task.  Neurocognitive testing completed on 9/26/23.  Rev in detail all testing results with pt at appt.  Extended appt for review due to multiple comorbidities also contributing to sxs.  " "Pt testing reveals above ave pre morbid abilities.   Pt with marked diff with semantic verbal fluency, and verbal retrieval.  Pt with mild verbal encoding and retention issues and simple auditiory attention difficulties.   Intact processing speed, language, visuospatial skills, executive function and memory for visual materials.   Due to above difficulties, testing most fit MCI.  Exact etiology unknown.   Due to intact executive fx, less likely MS and head injury.  Due to intact visual memory and confrtonatational naming , less likely AD.  Per testing, untreated CURT and mood disorder likely factor. Pt notes being seen by sleep med many yrs ago and needing cpap but not feeling better using it so she stopped.   Rec reconnecting with sleep team for further eval.\"  Pt here today for neuro follow up. Pt last seen about 6 months ago.  Overall pt feeling about the same.  No new ms sxs.  No recent ms flare.  No recent steroids. Pt notes good healing of face from melanoma surgery.   Pt following up with dermatology.   Pt notes no recent falls or trips.  No loc.  No sz.  No change in bowel or bladder.  No change in vision.  No loss of vision.  No vertigo.  No headaches.Main issue remains diff with double and triple checkng her work.  Pt feels processing of tasks at work takes her longer but not any different last visit.  No issues from employer. Pt at same job for past 20 yrs.   Re reviewed neurocognitive recommendations with pt.  Pt did see sleep med and had updated  sleep study in interim with evidence of moderate CURT.  Pt was contacted by sleep med team and new rx for cpap was ordered. Pt awaiting delivery.  Pt also did follow up with speech / cogntive therapy. Pt only went once for initial consultation and declined any further therapy sessions.   Pt reminded these are good resources for her to help with her fluency and comprehension.  No change in mood or behavior.  Pt remains off imd meds from MS standpoint.   Of note, " "pt with bradycardia in office.  Asymptomatic. Pt notes no syncope or presyncope.  Pt with int dizziness for years. Pt has been logging her own bp and hr.  Reviewed with pt heart rate less than 60, fits dx of bradycardia.  On log sheet, pt with chronic bradycardia recordings.  Pt strongly recommended to reach out to pcp for further work up.  No chest pain or sob.        The following portions of the patient's history were reviewed and updated as appropriate: allergies, current medications, past family history, past medical history, past social history, past surgical history, and problem list.         Objective:    Blood pressure 142/72, pulse (!) 53, temperature 97.9 °F (36.6 °C), height 5' 1.5\" (1.562 m), weight 79.8 kg (176 lb).    Physical Exam  Constitutional:       General: She is not in acute distress.     Appearance: She is not ill-appearing.   Eyes:      General: Lids are normal.      Extraocular Movements: Extraocular movements intact.      Pupils: Pupils are equal, round, and reactive to light.   Musculoskeletal:      Right lower leg: No edema.      Left lower leg: No edema.   Neurological:      Mental Status: She is alert.      Motor: Motor strength is normal.     Deep Tendon Reflexes: Reflexes are normal and symmetric.   Psychiatric:         Speech: Speech normal.         Neurological Exam  Mental Status  Alert. Speech is normal. Language is fluent with no aphasia. Attention and concentration are normal. Fund of knowledge is appropriate for level of education.  No aphasia  No dysarthria  No right left confusion  No finger agnosia  No diff with following of 4 part command or orientation.    Cranial Nerves  CN II: Visual acuity is normal. Visual fields full to confrontation.  CN III, IV, VI: Extraocular movements intact bilaterally. Normal lids and orbits bilaterally. Pupils equal round and reactive to light bilaterally.  CN V: Facial sensation is normal.  CN VII: Full and symmetric facial movement.  CN " VIII: Hearing is normal.  CN IX, X: Palate elevates symmetrically. Normal gag reflex.  CN XI: Shoulder shrug strength is normal.  CN XII: Tongue midline without atrophy or fasciculations.    Motor  Normal muscle bulk throughout. Normal muscle tone. No abnormal involuntary movements. Strength is 5/5 throughout all four extremities.    Sensory  Sensation is intact to light touch, pinprick, vibration and proprioception in all four extremities.    Reflexes  Deep tendon reflexes are 2+ and symmetric in all four extremities.    Coordination  Right: Finger-to-nose normal. Rapid alternating movement normal.Left: Finger-to-nose normal. Rapid alternating movement normal.    Gait  Casual gait is normal including stance, stride, and arm swing.        ROS:    Review of Systems   Constitutional:  Negative for appetite change, fatigue and fever.   HENT: Negative.  Negative for hearing loss, tinnitus, trouble swallowing and voice change.    Eyes: Negative.  Negative for photophobia, pain and visual disturbance.   Respiratory: Negative.  Negative for shortness of breath.    Cardiovascular: Negative.  Negative for palpitations.   Gastrointestinal: Negative.  Negative for nausea and vomiting.   Endocrine: Negative.  Negative for cold intolerance.   Genitourinary: Negative.  Negative for dysuria, frequency and urgency.   Musculoskeletal:  Negative for back pain, gait problem, myalgias, neck pain and neck stiffness.   Skin: Negative.  Negative for rash.   Allergic/Immunologic: Negative.    Neurological: Negative.  Negative for dizziness, tremors, seizures, syncope, facial asymmetry, speech difficulty, weakness, light-headedness, numbness and headaches.   Hematological: Negative.  Does not bruise/bleed easily.   Psychiatric/Behavioral: Negative.  Negative for confusion, hallucinations and sleep disturbance.    All other systems reviewed and are negative.    No new issues to address

## 2024-04-18 NOTE — ASSESSMENT & PLAN NOTE
Pt with HR today 51 and recheck 53  Pt has been recording her bp and hr for several months and showed me handwritten log  Pt with history of bradycardia  Pt unaware of slowing  Pt denies any syncope but does have occ dizziness  Rec follow up with pcp and showing her flow sheet as well

## 2024-04-19 NOTE — TELEPHONE ENCOUNTER
Called patient to discuss CPAP order.  Advised patient that order was sent to [facility] on 4/15/24.  Their rep will contact her in 7-10 days to schedule set up appointment in the office.    Patient states she was aware of this and will wait for the call.

## 2024-05-10 ENCOUNTER — TELEPHONE (OUTPATIENT)
Dept: HEMATOLOGY ONCOLOGY | Facility: CLINIC | Age: 69
End: 2024-05-10

## 2024-05-10 NOTE — TELEPHONE ENCOUNTER
Appointment Change  Cancel, Reschedule, Change to Virtual      Who are you speaking with? Patient   If it is not the patient, is the caller listed on the communication consent form? N/A   Which provider is the appointment scheduled with? Dr. Godfrey   When was the original appointment scheduled?    Please list date and time 06/13/2024 @8:40AM    At which location is the appointment scheduled to take place? [facility]   Was the appointment rescheduled?     Was the appointment changed from an in person visit to a virtual visit?    If so, please list the details of the change. No, will call back .   What is the reason for the appointment change? provider

## 2024-05-15 ENCOUNTER — TELEPHONE (OUTPATIENT)
Dept: HEMATOLOGY ONCOLOGY | Facility: CLINIC | Age: 69
End: 2024-05-15

## 2024-05-15 NOTE — TELEPHONE ENCOUNTER
Appointment Change  Cancel, Reschedule, Change to Virtual      Who are you speaking with? Patient   If it is not the patient, is the caller listed on the communication consent form? N/A   Which provider is the appointment scheduled with? Dr. Godfrey   When was the original appointment scheduled?    Please list date and time 6/13/24 8:40 AM   At which location is the appointment scheduled to take place? [facility]   Was the appointment rescheduled?     Was the appointment changed from an in person visit to a virtual visit?    If so, please list the details of the change. 7/10/24 11:00 AM   What is the reason for the appointment change? Pt requested change due to scheduling conflict.        Was STAR transport scheduled? No   Does STAR transport need to be scheduled for the new visit (if applicable) No   Does the patient need an infusion appointment rescheduled? No   Does the patient have an upcoming infusion appointment scheduled? If so, when? No   Is the patient undergoing chemotherapy? No   For appointments cancelled with less than 24 hours:  Was the no-show policy reviewed? Yes

## 2024-07-02 ENCOUNTER — TELEPHONE (OUTPATIENT)
Dept: SLEEP CENTER | Facility: CLINIC | Age: 69
End: 2024-07-02

## 2024-07-02 NOTE — TELEPHONE ENCOUNTER
Incoming call from patient who states she has spoke to many people today and hopes to get resolution to issue. Patient had sleep study and CPAP was ordered but she has yet to receive it.  States she called [facility] and they are waiting on sleep study.    ___________________________________  Looked in [place]:     Brice Knott  [facility] [place]  5/17 ? 3:02PM  uploaded documentation: Sleep Study: CPAP & BiLevel  Brice Knott  [facility] [place]  5/17 ? 3:00PM  @Elayne Nolan   - [place] sleep study uploaded, please process order and contact patient to schedule set up of equipment.  Thank you.  [facility]  5/16 ? 4:45PM  Canceled: Order for CPAP Machine, [employer] + 10 other items was canceled. Please contact the supplier if you wish to have this order fulfilled  Elayne Rodartes  [facility]  5/16 ? 2:48PM  @Roma Collins   This order is to be voided as we do not have a new diagnostic study to proceed.   Elayne Ballejos  [facility]  5/8 ? 4:33PM  @Roma Collins   Has a new in lab diagnostic study been scheduled to proceed? Patient needs to requalify as she stopped using previous machine and needs to requalify.   Elayne Lupilloejos  [facility]  5/1 ? 2:29PM  @Roma Reinoso, please upload a new baseline sleep study.   Elayne Ballejos  [facility]  4/25 ? 9:06AM  @Roma Reinoso, please upload a new baseline sleep study.   Elayne Ballejos  [facility]  4/16 ? 3:14PM  @Roma Reinoso, please upload a new baseline sleep study. __________________________________    Reviewed chart and discussed with patient. Will start from scratch with Script for CPAP and all clinicals, Initial consult, CPAP study, HST and HST form sent to [facility] via [employer] with request to expedite.     Email sent to Ingris Bray  Terrie and Edith Ramirez requesting they expedite order for patient.     Compliance follow up rescheduled for 9/24/2024 @ 1:45 pm with Dr. Willard in the [place] office.

## 2024-07-03 LAB

## 2024-07-10 ENCOUNTER — OFFICE VISIT (OUTPATIENT)
Dept: HEMATOLOGY ONCOLOGY | Facility: CLINIC | Age: 69
End: 2024-07-10
Payer: COMMERCIAL

## 2024-07-10 ENCOUNTER — TELEPHONE (OUTPATIENT)
Dept: HEMATOLOGY ONCOLOGY | Facility: CLINIC | Age: 69
End: 2024-07-10

## 2024-07-10 ENCOUNTER — APPOINTMENT (OUTPATIENT)
Dept: LAB | Facility: CLINIC | Age: 69
End: 2024-07-10
Payer: COMMERCIAL

## 2024-07-10 VITALS
HEIGHT: 62 IN | DIASTOLIC BLOOD PRESSURE: 82 MMHG | RESPIRATION RATE: 18 BRPM | SYSTOLIC BLOOD PRESSURE: 138 MMHG | TEMPERATURE: 97.9 F | WEIGHT: 174.5 LBS | OXYGEN SATURATION: 98 % | BODY MASS INDEX: 32.11 KG/M2 | HEART RATE: 74 BPM

## 2024-07-10 DIAGNOSIS — C43.30 MALIGNANT MELANOMA OF FACE (HCC): Primary | ICD-10-CM

## 2024-07-10 DIAGNOSIS — Z08 ENCOUNTER FOR FOLLOW-UP SURVEILLANCE OF MELANOMA: Primary | ICD-10-CM

## 2024-07-10 DIAGNOSIS — C43.30 MALIGNANT MELANOMA OF FACE (HCC): ICD-10-CM

## 2024-07-10 DIAGNOSIS — Z85.820 ENCOUNTER FOR FOLLOW-UP SURVEILLANCE OF MELANOMA: Primary | ICD-10-CM

## 2024-07-10 LAB
ALBUMIN SERPL BCG-MCNC: 4.5 G/DL (ref 3.5–5)
ALP SERPL-CCNC: 70 U/L (ref 34–104)
ALT SERPL W P-5'-P-CCNC: 16 U/L (ref 7–52)
ANION GAP SERPL CALCULATED.3IONS-SCNC: 6 MMOL/L (ref 4–13)
AST SERPL W P-5'-P-CCNC: 17 U/L (ref 13–39)
BASOPHILS # BLD AUTO: 0.03 THOUSANDS/ÂΜL (ref 0–0.1)
BASOPHILS NFR BLD AUTO: 1 % (ref 0–1)
BILIRUB SERPL-MCNC: 0.68 MG/DL (ref 0.2–1)
BUN SERPL-MCNC: 15 MG/DL (ref 5–25)
CALCIUM SERPL-MCNC: 9.8 MG/DL (ref 8.4–10.2)
CHLORIDE SERPL-SCNC: 104 MMOL/L (ref 96–108)
CO2 SERPL-SCNC: 30 MMOL/L (ref 21–32)
CREAT SERPL-MCNC: 0.66 MG/DL (ref 0.6–1.3)
EOSINOPHIL # BLD AUTO: 0.12 THOUSAND/ÂΜL (ref 0–0.61)
EOSINOPHIL NFR BLD AUTO: 2 % (ref 0–6)
ERYTHROCYTE [DISTWIDTH] IN BLOOD BY AUTOMATED COUNT: 11.9 % (ref 11.6–15.1)
GFR SERPL CREATININE-BSD FRML MDRD: 89 ML/MIN/1.73SQ M
GLUCOSE SERPL-MCNC: 122 MG/DL (ref 65–140)
HCT VFR BLD AUTO: 41.7 % (ref 34.8–46.1)
HGB BLD-MCNC: 14 G/DL (ref 11.5–15.4)
IMM GRANULOCYTES # BLD AUTO: 0.01 THOUSAND/UL (ref 0–0.2)
IMM GRANULOCYTES NFR BLD AUTO: 0 % (ref 0–2)
LDH SERPL-CCNC: 161 U/L (ref 140–271)
LYMPHOCYTES # BLD AUTO: 2.44 THOUSANDS/ÂΜL (ref 0.6–4.47)
LYMPHOCYTES NFR BLD AUTO: 44 % (ref 14–44)
MCH RBC QN AUTO: 32.4 PG (ref 26.8–34.3)
MCHC RBC AUTO-ENTMCNC: 33.6 G/DL (ref 31.4–37.4)
MCV RBC AUTO: 97 FL (ref 82–98)
MONOCYTES # BLD AUTO: 0.44 THOUSAND/ÂΜL (ref 0.17–1.22)
MONOCYTES NFR BLD AUTO: 8 % (ref 4–12)
NEUTROPHILS # BLD AUTO: 2.56 THOUSANDS/ÂΜL (ref 1.85–7.62)
NEUTS SEG NFR BLD AUTO: 45 % (ref 43–75)
NRBC BLD AUTO-RTO: 0 /100 WBCS
PLATELET # BLD AUTO: 245 THOUSANDS/UL (ref 149–390)
PMV BLD AUTO: 10.2 FL (ref 8.9–12.7)
POTASSIUM SERPL-SCNC: 3.7 MMOL/L (ref 3.5–5.3)
PROT SERPL-MCNC: 7.1 G/DL (ref 6.4–8.4)
RBC # BLD AUTO: 4.32 MILLION/UL (ref 3.81–5.12)
SODIUM SERPL-SCNC: 140 MMOL/L (ref 135–147)
WBC # BLD AUTO: 5.6 THOUSAND/UL (ref 4.31–10.16)

## 2024-07-10 PROCEDURE — 36415 COLL VENOUS BLD VENIPUNCTURE: CPT

## 2024-07-10 PROCEDURE — 80053 COMPREHEN METABOLIC PANEL: CPT

## 2024-07-10 PROCEDURE — G2211 COMPLEX E/M VISIT ADD ON: HCPCS | Performed by: INTERNAL MEDICINE

## 2024-07-10 PROCEDURE — 85025 COMPLETE CBC W/AUTO DIFF WBC: CPT

## 2024-07-10 PROCEDURE — 83615 LACTATE (LD) (LDH) ENZYME: CPT

## 2024-07-10 PROCEDURE — 99213 OFFICE O/P EST LOW 20 MIN: CPT | Performed by: INTERNAL MEDICINE

## 2024-07-10 NOTE — LETTER
July 14, 2024     Naomie Wilson, DO  [address] [facility]  1st Floor  [place] [address]    Patient: Sarah Thompson   YOB: 1955   Date of Visit: 7/10/2024       Dear Dr. Wilson:    Thank you for referring Sarah Thompson to me for evaluation. Below are my notes for this consultation.    If you have questions, please do not hesitate to call me. I look forward to following your patient along with you.         Sincerely,        Juliet Godfrey MD        CC: No Recipients    Juliet Godfrey MD  7/14/2024  5:29 PM  Sign when Signing Visit  [facility] [address] [address]  [phone]  [phone]     Date of Visit: 7/10/2024  Name: Sarah Thompson   YOB: 1955        Subjective    VISIT DIAGNOSIS:  Diagnoses and all orders for this visit:    Encounter for follow-up surveillance of melanoma    Malignant melanoma of face (HCC)        Oncology History   Malignant melanoma of face (HCC)   2/20/2023 -  Cancer Staged    Staging form: Melanoma of the Skin, AJCC 8th Edition  - Clinical stage from 2/20/2023: Stage IA (cT1a, cN0, cM0) - Signed by Juliet Godfrey MD on 6/8/2023 6/8/2023 Initial Diagnosis    Malignant melanoma of face (HCC)        Cancer Staging   Malignant melanoma of face (HCC)  Staging form: Melanoma of the Skin, AJCC 8th Edition  - Clinical stage from 2/20/2023: Stage IA (cT1a, cN0, cM0) - Signed by Juliet Godfrey MD on 6/8/2023          HISTORY OF PRESENT ILLNESS: Sarah Thompson is a 69 y.o. female  who has stage Ia melanoma here for continued monitoring, follow-up and surveillance.    She is doing well.  No concerns or complaints today.  Energy is good.  Denies any new, changing, concerning skin lesions.  Denies any lymphadenopathy.  Continues to follow with dermatology.        REVIEW OF SYSTEMS:  Review of Systems   Constitutional:  Negative for appetite change, fatigue, fever  and unexpected weight change.   HENT:   Negative for lump/mass, trouble swallowing and voice change.    Eyes:  Negative for icterus.   Respiratory:  Negative for cough, shortness of breath and wheezing.    Cardiovascular:  Negative for leg swelling.   Gastrointestinal:  Negative for abdominal pain, constipation, diarrhea, nausea and vomiting.   Genitourinary:  Negative for difficulty urinating and hematuria.    Musculoskeletal:  Negative for arthralgias, gait problem and myalgias.   Skin:  Negative for itching and rash.        No new, changing, or concerning lesions.   Neurological:  Negative for extremity weakness, gait problem, headaches, light-headedness and numbness.   Hematological:  Negative for adenopathy.        MEDICATIONS:    Current Outpatient Medications:   •  albuterol (PROVENTIL HFA,VENTOLIN HFA) 90 mcg/act inhaler, Inhale 1 puff every 4 (four) hours as needed , Disp: , Rfl:   •  amLODIPine (NORVASC) 5 mg tablet, Take 5 mg by mouth daily, Disp: , Rfl:   •  Digestive Enzymes (DIGESTIVE ENZYME ULTRA PO), Use daily, Disp: , Rfl:   •  fluticasone (FLONASE) 50 mcg/act nasal spray, SPRAY 2 SPRAYS INTO EACH NOSTRIL EVERY DAY, Disp: , Rfl:   •  Homeopathic Products (SIMILASAN DRY EYE RELIEF) SOLN, Apply to eye if needed, Disp: , Rfl:   •  Loratadine 10 MG CAPS, Take 10 mg by mouth as needed, Disp: , Rfl:   •  Multiple Vitamins-Minerals (MULTIVITAMIN ADULT PO), 2 (two) times a day , Disp: , Rfl:   •  omeprazole (PriLOSEC) 20 mg delayed release capsule, Take 20 mg by mouth daily, Disp: , Rfl:   •  cycloSPORINE (RESTASIS) 0.05 % ophthalmic emulsion, Apply 0.05 % to eye if needed, Disp: , Rfl:   •  metoprolol succinate (TOPROL-XL) 50 mg 24 hr tablet, Take 50 mg by mouth daily, Disp: , Rfl:   •  zolpidem (AMBIEN) 5 mg tablet, Take 1 tablet (5 mg total) by mouth as needed for sleep (for use during sleep study) for up to 1 day, Disp: 1 tablet, Rfl: 0     ALLERGIES:  Allergies   Allergen Reactions   • Bee Venom       Other reaction(s): fatal-hospitalized as child   • Latex    • Mold Extract [Trichophyton]    • Sulfa Antibiotics         ACTIVE PROBLEMS:  Patient Active Problem List   Diagnosis   • Multiple sclerosis (HCC)   • Postconcussion syndrome   • Hypomania (HCC)   • Hyperverbal speech   • Bipolar affective disorder, current episode hypomanic ([facility])   • CURT (obstructive sleep apnea)   • Snoring   • Nocturnal hypoxemia   • Simple febrile convulsions ([facility])   • Neck pain   • Memory loss   • Malignant melanoma of face (HCC)   • Bradycardia          PAST MEDICAL HISTORY:   Past Medical History:   Diagnosis Date   • Asthma    • Hypertension    • Melanoma (HCC) 02/20/2023    Left upper cheek   • Multiple sclerosis (HCC)    • Post concussive syndrome    • Psychiatric disorder         PAST SURGICAL HISTORY:  Past Surgical History:   Procedure Laterality Date   • CATARACT EXTRACTION Left 12/27/2022   • MOHS SURGERY  03/28/2023    Left upper cheek-Dr. Mamadou Avelar        SOCIAL HISTORY:  Social History     Socioeconomic History   • Marital status: Single     Spouse name: Not on file   • Number of children: Not on file   • Years of education: Not on file   • Highest education level: Not on file   Occupational History   • Not on file   Tobacco Use   • Smoking status: Never   • Smokeless tobacco: Never   Vaping Use   • Vaping status: Never Used   Substance and Sexual Activity   • Alcohol use: Yes     Comment: very infrequent   • Drug use: No   • Sexual activity: Not on file   Other Topics Concern   • Not on file   Social History Narrative   • Not on file     Social Determinants of Health     Financial Resource Strain: Not on file   Food Insecurity: Not on file   Transportation Needs: Not on file   Physical Activity: Not on file   Stress: Not on file   Social Connections: Not on file   Intimate Partner Violence: Not on file   Housing Stability: Not on file        FAMILY HISTORY:  Family History   Problem Relation Age of Onset   • Heart  "failure Mother    • Pneumonia Mother    • Heart failure Father    • Muscular dystrophy Father    • Pneumonia Father            Objective    PHYSICAL EXAMINATION:   Blood pressure 138/82, pulse 74, temperature 97.9 °F (36.6 °C), temperature source Temporal, resp. rate 18, height 5' 1.5\" (1.562 m), weight 79.2 kg (174 lb 8 oz), SpO2 98%.     Pain Score: 0-No pain     ECOG Performance Status      Flowsheet Row Most Recent Value   ECOG Performance Status 0 - Fully active, able to carry on all pre-disease performance without restriction               Physical Exam  Constitutional:       General: She is not in acute distress.     Appearance: Normal appearance. She is not ill-appearing or toxic-appearing.   HENT:      Head: Normocephalic and atraumatic.      Right Ear: External ear normal.      Left Ear: External ear normal.      Nose: Nose normal.      Mouth/Throat:      Mouth: Mucous membranes are moist.      Pharynx: Oropharynx is clear.   Eyes:      General: No scleral icterus.        Right eye: No discharge.         Left eye: No discharge.      Conjunctiva/sclera: Conjunctivae normal.   Cardiovascular:      Rate and Rhythm: Normal rate and regular rhythm.      Pulses: Normal pulses.      Heart sounds: No murmur heard.     No friction rub. No gallop.   Pulmonary:      Effort: Pulmonary effort is normal. No respiratory distress.      Breath sounds: Normal breath sounds. No wheezing or rales.   Abdominal:      General: Bowel sounds are normal. There is no distension.      Palpations: There is no mass.      Tenderness: There is no abdominal tenderness. There is no rebound.   Musculoskeletal:         General: No swelling or tenderness.      Cervical back: Normal range of motion. No rigidity.      Right lower leg: No edema.      Left lower leg: No edema.   Lymphadenopathy:      Head:      Right side of head: No submandibular, preauricular or posterior auricular adenopathy.      Left side of head: No submandibular, " preauricular or posterior auricular adenopathy.      Cervical: No cervical adenopathy.      Right cervical: No superficial or posterior cervical adenopathy.     Left cervical: No superficial or posterior cervical adenopathy.      Upper Body:      Right upper body: No supraclavicular or axillary adenopathy.      Left upper body: No supraclavicular or axillary adenopathy.      Lower Body: No right inguinal adenopathy. No left inguinal adenopathy.   Skin:     General: Skin is warm.      Coloration: Skin is not jaundiced.      Findings: No lesion or rash.      Comments: Well healed surgical scar.  No evidence of recurrence at primary site.   Neurological:      General: No focal deficit present.      Mental Status: She is alert and oriented to person, place, and time.      Cranial Nerves: No cranial nerve deficit.      Motor: No weakness.      Gait: Gait normal.   Psychiatric:         Mood and Affect: Mood normal.         Behavior: Behavior normal.         Thought Content: Thought content normal.         Judgment: Judgment normal.         I reviewed lab data in the chart.    WBC   Date Value Ref Range Status   07/10/2024 5.60 4.31 - 10.16 Thousand/uL Final   12/09/2023 4.10 (L) 4.31 - 10.16 Thousand/uL Final   08/10/2019 4.69 4.31 - 10.16 Thousand/uL Final   10/31/2015 4.00 (L) 4.31 - 10.16 Thousand/uL Final   09/11/2015 4.83 4.31 - 10.16 Thousand/uL Final   06/24/2015 5.54 4.31 - 10.16 Thousand/uL Final     Hemoglobin   Date Value Ref Range Status   07/10/2024 14.0 11.5 - 15.4 g/dL Final   12/09/2023 13.4 11.5 - 15.4 g/dL Final   08/10/2019 13.3 11.5 - 15.4 g/dL Final   10/31/2015 14.2 11.5 - 15.4 g/dL Final   09/11/2015 13.8 11.5 - 15.4 g/dL Final   06/24/2015 13.4 11.5 - 15.4 g/dL Final     Platelets   Date Value Ref Range Status   07/10/2024 245 149 - 390 Thousands/uL Final   12/09/2023 226 149 - 390 Thousands/uL Final   08/10/2019 213 149 - 390 Thousands/uL Final   10/31/2015 212 149 - 390 Thousand/uL Final    09/11/2015 214 149 - 390 Thousand/uL Final   06/24/2015 204 149 - 390 Thousand/uL Final     MCV   Date Value Ref Range Status   07/10/2024 97 82 - 98 fL Final   12/09/2023 96 82 - 98 fL Final   08/10/2019 98 82 - 98 fL Final   10/31/2015 94 82 - 98 fL Final   09/11/2015 95 82 - 98 fL Final   06/24/2015 97 82 - 98 fL Final      Sodium   Date Value Ref Range Status   10/31/2015 142 136 - 145 mmol/L Final   09/11/2015 141 136 - 145 mmol/L Final   11/17/2014 139 135 - 145 mmol/L Final     Potassium   Date Value Ref Range Status   07/10/2024 3.7 3.5 - 5.3 mmol/L Final   12/09/2023 4.6 3.5 - 5.3 mmol/L Final   09/26/2023 4.3 3.5 - 5.2 mmol/L Final   03/16/2023 4.1 3.5 - 5.2 mmol/L Final   09/10/2022 4.8 3.5 - 5.2 mmol/L Final     Chloride   Date Value Ref Range Status   07/10/2024 104 96 - 108 mmol/L Final   12/09/2023 105 96 - 108 mmol/L Final   09/26/2023 103 100 - 109 mmol/L Final   03/16/2023 103 100 - 109 mmol/L Final   09/10/2022 108 100 - 109 mmol/L Final     Carbon Dioxide   Date Value Ref Range Status   09/26/2023 31 21 - 31 mmol/L Final   03/16/2023 29 21 - 31 mmol/L Final     Comment:     Please note change in the reference range as of 02/22/23   09/10/2022 33 (H) 23 - 31 mmol/L Final     CO2   Date Value Ref Range Status   07/10/2024 30 21 - 32 mmol/L Final   12/09/2023 30 21 - 32 mmol/L Final     BUN   Date Value Ref Range Status   07/10/2024 15 5 - 25 mg/dL Final   12/09/2023 16 5 - 25 mg/dL Final   09/26/2023 15 7 - 25 mg/dL Final   03/16/2023 12 7 - 25 mg/dL Final   09/10/2022 17 7 - 25 mg/dL Final     Creatinine   Date Value Ref Range Status   07/10/2024 0.66 0.60 - 1.30 mg/dL Final     Comment:     Standardized to IDMS reference method   12/09/2023 0.69 0.60 - 1.30 mg/dL Final     Comment:     Standardized to IDMS reference method   09/26/2023 0.70 0.40 - 1.10 mg/dL Final   03/16/2023 0.81 0.40 - 1.10 mg/dL Final   09/10/2022 0.80 0.40 - 1.10 mg/dL Final     Glucose   Date Value Ref Range Status    07/10/2024 122 65 - 140 mg/dL Final     Comment:     If the patient is fasting, the ADA then defines impaired fasting glucose as > 100 mg/dL and diabetes as > or equal to 123 mg/dL.   09/26/2023 94 65 - 99 mg/dL Final   03/16/2023 108 (H) 65 - 99 mg/dL Final   09/10/2022 107 (H) 65 - 99 mg/dL Final     eGFR, Non-   Date Value Ref Range Status   08/23/2021 91 >60 Final     eGFR,    Date Value Ref Range Status   08/23/2021 106 >60 Final     Calcium   Date Value Ref Range Status   07/10/2024 9.8 8.4 - 10.2 mg/dL Final   12/09/2023 9.4 8.4 - 10.2 mg/dL Final   09/26/2023 9.1 8.5 - 10.1 mg/dL Final   03/16/2023 9.4 8.5 - 10.1 mg/dL Final   09/10/2022 9.6 8.5 - 10.1 mg/dL Final     Total Protein   Date Value Ref Range Status   10/31/2015 7.1 6.4 - 8.2 g/dL Final   09/11/2015 7.8 6.4 - 8.2 g/dL Final   11/17/2014 7.2 6.4 - 8.2 g/dL Final     Albumin   Date Value Ref Range Status   07/10/2024 4.5 3.5 - 5.0 g/dL Final   12/09/2023 4.2 3.5 - 5.0 g/dL Final     ALBUMIN   Date Value Ref Range Status   09/26/2023 4.3 3.5 - 5.7 g/dL Final   09/10/2022 3.9 3.5 - 4.8 g/dL Final   08/23/2021 4.0 3.5 - 4.8 g/dL Final     Total Bilirubin   Date Value Ref Range Status   07/10/2024 0.68 0.20 - 1.00 mg/dL Final     Comment:     Use of this assay is not recommended for patients undergoing treatment with eltrombopag due to the potential for falsely elevated results.  N-acetyl-p-benzoquinone imine (metabolite of Acetaminophen) will generate erroneously low results in samples for patients that have taken an overdose of Acetaminophen.   12/09/2023 0.66 0.20 - 1.00 mg/dL Final     Comment:     Use of this assay is not recommended for patients undergoing treatment with eltrombopag due to the potential for falsely elevated results.  N-acetyl-p-benzoquinone imine (metabolite of Acetaminophen) will generate erroneously low results in samples for patients that have taken an overdose of Acetaminophen.   09/26/2023  "0.7 0.2 - 1.0 mg/dL Final     Comment:     Eltrombopag and its metabolites may interfere with this assay causing erroneously high patient results.   09/10/2022 0.8 0.2 - 1.0 mg/dL Final     Comment:     Use of this assay is not recommended for patients undergoing treatment with eltrombopag due to the potential for falsely elevated results.   08/23/2021 0.9 0.2 - 1.0 mg/dL Final     Comment:     Use of this assay is not recommended for patients undergoing treatment with eltrombopag due to the potential for falsely elevated results.     Alkaline Phosphatase   Date Value Ref Range Status   07/10/2024 70 34 - 104 U/L Final   12/09/2023 62 34 - 104 U/L Final   09/26/2023 55 35 - 120 U/L Final   09/10/2022 68 35 - 120 U/L Final   08/23/2021 64 35 - 120 U/L Final     AST   Date Value Ref Range Status   07/10/2024 17 13 - 39 U/L Final   12/09/2023 20 13 - 39 U/L Final   09/26/2023 21 <41 U/L Final   09/10/2022 18 <41 U/L Final   08/23/2021 14 <41 U/L Final     ALT   Date Value Ref Range Status   07/10/2024 16 7 - 52 U/L Final     Comment:     Specimen collection should occur prior to Sulfasalazine administration due to the potential for falsely depressed results.    12/09/2023 20 7 - 52 U/L Final     Comment:     Specimen collection should occur prior to Sulfasalazine administration due to the potential for falsely depressed results.    09/26/2023 26 <56 U/L Final   09/10/2022 28 <56 U/L Final   08/23/2021 22 <56 U/L Final      LD   Date Value Ref Range Status   07/10/2024 161 140 - 271 U/L Final   12/09/2023 159 140 - 271 U/L Final     TSH   Date Value Ref Range Status   08/23/2021 2.55 0.36 - 3.74 uIU/mL Final     Comment:       Ordering Provider: CHIRAG HARE  Report Copied to : DANTE WRIGHT   08/29/2020 2.33 0.36 - 3.74 uIU/mL Final     No results found for: \"P5FCCYP\"   No results found for: \"FREET4\"      RECENT IMAGING:  No results found.          Assessment/Plan  Ms. Thompson is a 69-year-old female with " stage Ia melanoma here for continued monitoring, follow-up and surveillance.    1. Encounter for follow-up surveillance of melanoma  2. Malignant melanoma of face (HCC)  She is doing well with no clinical evidence of melanoma recurrence or metastasis.  Labs reviewed and okay.  She continues to follow closely with dermatology.  She knows to continue to monitor for any new, changing, concerning skin lesions or lymphadenopathy.  She knows to call with issues or concerns prior to her next visit.  Will see her in follow-up in 6 months.  Orders placed and prescription provided for blood work to be done prior to her next visit.        Return in about 6 months (around 1/10/2025) for Office Visit, labs.     Juliet Godfrey MD, PhD

## 2024-07-10 NOTE — TELEPHONE ENCOUNTER
Called and left a message on Sarah's voicemail relaying Dr. Godfrey's message. Left the hopeline number in case she needs to call back.

## 2024-07-10 NOTE — TELEPHONE ENCOUNTER
----- Message from Lidia Godfrey MD sent at 7/10/2024  2:18 PM EDT -----  Can you let her know that her labs look good  lidia  ----- Message -----  From: Lab, Background User  Sent: 7/10/2024   1:03 PM EDT  To: Lidia Godfrey MD

## 2024-07-11 LAB

## 2024-07-14 NOTE — PROGRESS NOTES
[facility]  [address] [address]  [phone]  [phone]     Date of Visit: 7/10/2024  Name: Sarah Thompson   YOB: 1955        Subjective    VISIT DIAGNOSIS:  Diagnoses and all orders for this visit:    Encounter for follow-up surveillance of melanoma    Malignant melanoma of face (HCC)        Oncology History   Malignant melanoma of face (HCC)   2/20/2023 -  Cancer Staged    Staging form: Melanoma of the Skin, AJCC 8th Edition  - Clinical stage from 2/20/2023: Stage IA (cT1a, cN0, cM0) - Signed by Juliet Godfrey MD on 6/8/2023 6/8/2023 Initial Diagnosis    Malignant melanoma of face (HCC)        Cancer Staging   Malignant melanoma of face (HCC)  Staging form: Melanoma of the Skin, AJCC 8th Edition  - Clinical stage from 2/20/2023: Stage IA (cT1a, cN0, cM0) - Signed by Juliet Godfrey MD on 6/8/2023          HISTORY OF PRESENT ILLNESS: Sarah Thompson is a 69 y.o. female  who has stage Ia melanoma here for continued monitoring, follow-up and surveillance.    She is doing well.  No concerns or complaints today.  Energy is good.  Denies any new, changing, concerning skin lesions.  Denies any lymphadenopathy.  Continues to follow with dermatology.        REVIEW OF SYSTEMS:  Review of Systems   Constitutional:  Negative for appetite change, fatigue, fever and unexpected weight change.   HENT:   Negative for lump/mass, trouble swallowing and voice change.    Eyes:  Negative for icterus.   Respiratory:  Negative for cough, shortness of breath and wheezing.    Cardiovascular:  Negative for leg swelling.   Gastrointestinal:  Negative for abdominal pain, constipation, diarrhea, nausea and vomiting.   Genitourinary:  Negative for difficulty urinating and hematuria.    Musculoskeletal:  Negative for arthralgias, gait problem and myalgias.   Skin:  Negative for itching and rash.        No new, changing, or concerning lesions.   Neurological:   Negative for extremity weakness, gait problem, headaches, light-headedness and numbness.   Hematological:  Negative for adenopathy.        MEDICATIONS:    Current Outpatient Medications:     albuterol (PROVENTIL HFA,VENTOLIN HFA) 90 mcg/act inhaler, Inhale 1 puff every 4 (four) hours as needed , Disp: , Rfl:     amLODIPine (NORVASC) 5 mg tablet, Take 5 mg by mouth daily, Disp: , Rfl:     Digestive Enzymes (DIGESTIVE ENZYME ULTRA PO), Use daily, Disp: , Rfl:     fluticasone (FLONASE) 50 mcg/act nasal spray, SPRAY 2 SPRAYS INTO EACH NOSTRIL EVERY DAY, Disp: , Rfl:     Homeopathic Products (SIMILASAN DRY EYE RELIEF) SOLN, Apply to eye if needed, Disp: , Rfl:     Loratadine 10 MG CAPS, Take 10 mg by mouth as needed, Disp: , Rfl:     Multiple Vitamins-Minerals (MULTIVITAMIN ADULT PO), 2 (two) times a day , Disp: , Rfl:     omeprazole (PriLOSEC) 20 mg delayed release capsule, Take 20 mg by mouth daily, Disp: , Rfl:     cycloSPORINE (RESTASIS) 0.05 % ophthalmic emulsion, Apply 0.05 % to eye if needed, Disp: , Rfl:     metoprolol succinate (TOPROL-XL) 50 mg 24 hr tablet, Take 50 mg by mouth daily, Disp: , Rfl:     zolpidem (AMBIEN) 5 mg tablet, Take 1 tablet (5 mg total) by mouth as needed for sleep (for use during sleep study) for up to 1 day, Disp: 1 tablet, Rfl: 0     ALLERGIES:  Allergies   Allergen Reactions    Bee Venom      Other reaction(s): fatal-hospitalized as child    Latex     Mold Extract [Trichophyton]     Sulfa Antibiotics         ACTIVE PROBLEMS:  Patient Active Problem List   Diagnosis    Multiple sclerosis (HCC)    Postconcussion syndrome    Hypomania (HCC)    Hyperverbal speech    Bipolar affective disorder, current episode hypomanic (HCC)    CURT (obstructive sleep apnea)    Snoring    Nocturnal hypoxemia    Simple febrile convulsions (HCC)    Neck pain    Memory loss    Malignant melanoma of face (HCC)    Bradycardia          PAST MEDICAL HISTORY:   Past Medical History:   Diagnosis Date    Asthma      "Hypertension     Melanoma (HCC) 02/20/2023    Left upper cheek    Multiple sclerosis (HCC)     Post concussive syndrome     Psychiatric disorder         PAST SURGICAL HISTORY:  Past Surgical History:   Procedure Laterality Date    CATARACT EXTRACTION Left 12/27/2022    MOHS SURGERY  03/28/2023    Left upper cheek-Dr. Mamadou Avelar        SOCIAL HISTORY:  Social History     Socioeconomic History    Marital status: Single     Spouse name: Not on file    Number of children: Not on file    Years of education: Not on file    Highest education level: Not on file   Occupational History    Not on file   Tobacco Use    Smoking status: Never    Smokeless tobacco: Never   Vaping Use    Vaping status: Never Used   Substance and Sexual Activity    Alcohol use: Yes     Comment: very infrequent    Drug use: No    Sexual activity: Not on file   Other Topics Concern    Not on file   Social History Narrative    Not on file     Social Determinants of Health     Financial Resource Strain: Not on file   Food Insecurity: Not on file   Transportation Needs: Not on file   Physical Activity: Not on file   Stress: Not on file   Social Connections: Not on file   Intimate Partner Violence: Not on file   Housing Stability: Not on file        FAMILY HISTORY:  Family History   Problem Relation Age of Onset    Heart failure Mother     Pneumonia Mother     Heart failure Father     Muscular dystrophy Father     Pneumonia Father            Objective    PHYSICAL EXAMINATION:   Blood pressure 138/82, pulse 74, temperature 97.9 °F (36.6 °C), temperature source Temporal, resp. rate 18, height 5' 1.5\" (1.562 m), weight 79.2 kg (174 lb 8 oz), SpO2 98%.     Pain Score: 0-No pain     ECOG Performance Status      Flowsheet Row Most Recent Value   ECOG Performance Status 0 - Fully active, able to carry on all pre-disease performance without restriction               Physical Exam  Constitutional:       General: She is not in acute distress.     Appearance: " Normal appearance. She is not ill-appearing or toxic-appearing.   HENT:      Head: Normocephalic and atraumatic.      Right Ear: External ear normal.      Left Ear: External ear normal.      Nose: Nose normal.      Mouth/Throat:      Mouth: Mucous membranes are moist.      Pharynx: Oropharynx is clear.   Eyes:      General: No scleral icterus.        Right eye: No discharge.         Left eye: No discharge.      Conjunctiva/sclera: Conjunctivae normal.   Cardiovascular:      Rate and Rhythm: Normal rate and regular rhythm.      Pulses: Normal pulses.      Heart sounds: No murmur heard.     No friction rub. No gallop.   Pulmonary:      Effort: Pulmonary effort is normal. No respiratory distress.      Breath sounds: Normal breath sounds. No wheezing or rales.   Abdominal:      General: Bowel sounds are normal. There is no distension.      Palpations: There is no mass.      Tenderness: There is no abdominal tenderness. There is no rebound.   Musculoskeletal:         General: No swelling or tenderness.      Cervical back: Normal range of motion. No rigidity.      Right lower leg: No edema.      Left lower leg: No edema.   Lymphadenopathy:      Head:      Right side of head: No submandibular, preauricular or posterior auricular adenopathy.      Left side of head: No submandibular, preauricular or posterior auricular adenopathy.      Cervical: No cervical adenopathy.      Right cervical: No superficial or posterior cervical adenopathy.     Left cervical: No superficial or posterior cervical adenopathy.      Upper Body:      Right upper body: No supraclavicular or axillary adenopathy.      Left upper body: No supraclavicular or axillary adenopathy.      Lower Body: No right inguinal adenopathy. No left inguinal adenopathy.   Skin:     General: Skin is warm.      Coloration: Skin is not jaundiced.      Findings: No lesion or rash.      Comments: Well healed surgical scar.  No evidence of recurrence at primary site.    Neurological:      General: No focal deficit present.      Mental Status: She is alert and oriented to person, place, and time.      Cranial Nerves: No cranial nerve deficit.      Motor: No weakness.      Gait: Gait normal.   Psychiatric:         Mood and Affect: Mood normal.         Behavior: Behavior normal.         Thought Content: Thought content normal.         Judgment: Judgment normal.         I reviewed lab data in the chart.    WBC   Date Value Ref Range Status   07/10/2024 5.60 4.31 - 10.16 Thousand/uL Final   12/09/2023 4.10 (L) 4.31 - 10.16 Thousand/uL Final   08/10/2019 4.69 4.31 - 10.16 Thousand/uL Final   10/31/2015 4.00 (L) 4.31 - 10.16 Thousand/uL Final   09/11/2015 4.83 4.31 - 10.16 Thousand/uL Final   06/24/2015 5.54 4.31 - 10.16 Thousand/uL Final     Hemoglobin   Date Value Ref Range Status   07/10/2024 14.0 11.5 - 15.4 g/dL Final   12/09/2023 13.4 11.5 - 15.4 g/dL Final   08/10/2019 13.3 11.5 - 15.4 g/dL Final   10/31/2015 14.2 11.5 - 15.4 g/dL Final   09/11/2015 13.8 11.5 - 15.4 g/dL Final   06/24/2015 13.4 11.5 - 15.4 g/dL Final     Platelets   Date Value Ref Range Status   07/10/2024 245 149 - 390 Thousands/uL Final   12/09/2023 226 149 - 390 Thousands/uL Final   08/10/2019 213 149 - 390 Thousands/uL Final   10/31/2015 212 149 - 390 Thousand/uL Final   09/11/2015 214 149 - 390 Thousand/uL Final   06/24/2015 204 149 - 390 Thousand/uL Final     MCV   Date Value Ref Range Status   07/10/2024 97 82 - 98 fL Final   12/09/2023 96 82 - 98 fL Final   08/10/2019 98 82 - 98 fL Final   10/31/2015 94 82 - 98 fL Final   09/11/2015 95 82 - 98 fL Final   06/24/2015 97 82 - 98 fL Final      Sodium   Date Value Ref Range Status   10/31/2015 142 136 - 145 mmol/L Final   09/11/2015 141 136 - 145 mmol/L Final   11/17/2014 139 135 - 145 mmol/L Final     Potassium   Date Value Ref Range Status   07/10/2024 3.7 3.5 - 5.3 mmol/L Final   12/09/2023 4.6 3.5 - 5.3 mmol/L Final   09/26/2023 4.3 3.5 - 5.2 mmol/L Final    03/16/2023 4.1 3.5 - 5.2 mmol/L Final   09/10/2022 4.8 3.5 - 5.2 mmol/L Final     Chloride   Date Value Ref Range Status   07/10/2024 104 96 - 108 mmol/L Final   12/09/2023 105 96 - 108 mmol/L Final   09/26/2023 103 100 - 109 mmol/L Final   03/16/2023 103 100 - 109 mmol/L Final   09/10/2022 108 100 - 109 mmol/L Final     Carbon Dioxide   Date Value Ref Range Status   09/26/2023 31 21 - 31 mmol/L Final   03/16/2023 29 21 - 31 mmol/L Final     Comment:     Please note change in the reference range as of 02/22/23   09/10/2022 33 (H) 23 - 31 mmol/L Final     CO2   Date Value Ref Range Status   07/10/2024 30 21 - 32 mmol/L Final   12/09/2023 30 21 - 32 mmol/L Final     BUN   Date Value Ref Range Status   07/10/2024 15 5 - 25 mg/dL Final   12/09/2023 16 5 - 25 mg/dL Final   09/26/2023 15 7 - 25 mg/dL Final   03/16/2023 12 7 - 25 mg/dL Final   09/10/2022 17 7 - 25 mg/dL Final     Creatinine   Date Value Ref Range Status   07/10/2024 0.66 0.60 - 1.30 mg/dL Final     Comment:     Standardized to IDMS reference method   12/09/2023 0.69 0.60 - 1.30 mg/dL Final     Comment:     Standardized to IDMS reference method   09/26/2023 0.70 0.40 - 1.10 mg/dL Final   03/16/2023 0.81 0.40 - 1.10 mg/dL Final   09/10/2022 0.80 0.40 - 1.10 mg/dL Final     Glucose   Date Value Ref Range Status   07/10/2024 122 65 - 140 mg/dL Final     Comment:     If the patient is fasting, the ADA then defines impaired fasting glucose as > 100 mg/dL and diabetes as > or equal to 123 mg/dL.   09/26/2023 94 65 - 99 mg/dL Final   03/16/2023 108 (H) 65 - 99 mg/dL Final   09/10/2022 107 (H) 65 - 99 mg/dL Final     eGFR, Non-   Date Value Ref Range Status   08/23/2021 91 >60 Final     eGFR,    Date Value Ref Range Status   08/23/2021 106 >60 Final     Calcium   Date Value Ref Range Status   07/10/2024 9.8 8.4 - 10.2 mg/dL Final   12/09/2023 9.4 8.4 - 10.2 mg/dL Final   09/26/2023 9.1 8.5 - 10.1 mg/dL Final   03/16/2023 9.4 8.5 -  10.1 mg/dL Final   09/10/2022 9.6 8.5 - 10.1 mg/dL Final     Total Protein   Date Value Ref Range Status   10/31/2015 7.1 6.4 - 8.2 g/dL Final   09/11/2015 7.8 6.4 - 8.2 g/dL Final   11/17/2014 7.2 6.4 - 8.2 g/dL Final     Albumin   Date Value Ref Range Status   07/10/2024 4.5 3.5 - 5.0 g/dL Final   12/09/2023 4.2 3.5 - 5.0 g/dL Final     ALBUMIN   Date Value Ref Range Status   09/26/2023 4.3 3.5 - 5.7 g/dL Final   09/10/2022 3.9 3.5 - 4.8 g/dL Final   08/23/2021 4.0 3.5 - 4.8 g/dL Final     Total Bilirubin   Date Value Ref Range Status   07/10/2024 0.68 0.20 - 1.00 mg/dL Final     Comment:     Use of this assay is not recommended for patients undergoing treatment with eltrombopag due to the potential for falsely elevated results.  N-acetyl-p-benzoquinone imine (metabolite of Acetaminophen) will generate erroneously low results in samples for patients that have taken an overdose of Acetaminophen.   12/09/2023 0.66 0.20 - 1.00 mg/dL Final     Comment:     Use of this assay is not recommended for patients undergoing treatment with eltrombopag due to the potential for falsely elevated results.  N-acetyl-p-benzoquinone imine (metabolite of Acetaminophen) will generate erroneously low results in samples for patients that have taken an overdose of Acetaminophen.   09/26/2023 0.7 0.2 - 1.0 mg/dL Final     Comment:     Eltrombopag and its metabolites may interfere with this assay causing erroneously high patient results.   09/10/2022 0.8 0.2 - 1.0 mg/dL Final     Comment:     Use of this assay is not recommended for patients undergoing treatment with eltrombopag due to the potential for falsely elevated results.   08/23/2021 0.9 0.2 - 1.0 mg/dL Final     Comment:     Use of this assay is not recommended for patients undergoing treatment with eltrombopag due to the potential for falsely elevated results.     Alkaline Phosphatase   Date Value Ref Range Status   07/10/2024 70 34 - 104 U/L Final   12/09/2023 62 34 - 104 U/L  "Final   09/26/2023 55 35 - 120 U/L Final   09/10/2022 68 35 - 120 U/L Final   08/23/2021 64 35 - 120 U/L Final     AST   Date Value Ref Range Status   07/10/2024 17 13 - 39 U/L Final   12/09/2023 20 13 - 39 U/L Final   09/26/2023 21 <41 U/L Final   09/10/2022 18 <41 U/L Final   08/23/2021 14 <41 U/L Final     ALT   Date Value Ref Range Status   07/10/2024 16 7 - 52 U/L Final     Comment:     Specimen collection should occur prior to Sulfasalazine administration due to the potential for falsely depressed results.    12/09/2023 20 7 - 52 U/L Final     Comment:     Specimen collection should occur prior to Sulfasalazine administration due to the potential for falsely depressed results.    09/26/2023 26 <56 U/L Final   09/10/2022 28 <56 U/L Final   08/23/2021 22 <56 U/L Final      LD   Date Value Ref Range Status   07/10/2024 161 140 - 271 U/L Final   12/09/2023 159 140 - 271 U/L Final     TSH   Date Value Ref Range Status   08/23/2021 2.55 0.36 - 3.74 uIU/mL Final     Comment:       Ordering Provider: CHIRAG HARE  Report Copied to : DANTE WRIGHT   08/29/2020 2.33 0.36 - 3.74 uIU/mL Final     No results found for: \"F0IAPAG\"   No results found for: \"FREET4\"      RECENT IMAGING:  No results found.          Assessment/Plan  Ms. Thompson is a 69-year-old female with stage Ia melanoma here for continued monitoring, follow-up and surveillance.    1. Encounter for follow-up surveillance of melanoma  2. Malignant melanoma of face (HCC)  She is doing well with no clinical evidence of melanoma recurrence or metastasis.  Labs reviewed and okay.  She continues to follow closely with dermatology.  She knows to continue to monitor for any new, changing, concerning skin lesions or lymphadenopathy.  She knows to call with issues or concerns prior to her next visit.  Will see her in follow-up in 6 months.  Orders placed and prescription provided for blood work to be done prior to her next visit.        Return in about 6 " months (around 1/10/2025) for Office Visit, labs.     Juliet Godfrey MD, PhD

## 2024-07-16 LAB

## 2024-08-07 ENCOUNTER — TELEPHONE (OUTPATIENT)
Age: 69
End: 2024-08-07

## 2024-08-07 NOTE — TELEPHONE ENCOUNTER
Contacted patient off of [facility]  to verify needs of services in attempts to offer patient an appointment. spoke with patient whom stated they were unsure if services were needed and would like to make a few phone calls before deciding. Writer made pt aware they had until end of next week on 8/16 to determine if services are needed and possibly schedule.    Pt taken off of wait list at this time.

## 2024-08-27 ENCOUNTER — TELEPHONE (OUTPATIENT)
Age: 69
End: 2024-08-27

## 2024-08-27 NOTE — TELEPHONE ENCOUNTER
Pt called to advise that she received her CPAP a month ago but has not been able to use it. CPAP has a odd message.    Warm transferred to Josefa at compliance dept with [employer].

## 2024-10-12 ENCOUNTER — APPOINTMENT (OUTPATIENT)
Dept: LAB | Facility: CLINIC | Age: 69
End: 2024-10-12
Payer: COMMERCIAL

## 2024-10-12 DIAGNOSIS — Z13.6 SCREENING FOR ISCHEMIC HEART DISEASE: ICD-10-CM

## 2024-10-12 LAB
CHOLEST SERPL-MCNC: 134 MG/DL
HDLC SERPL-MCNC: 51 MG/DL
LDLC SERPL CALC-MCNC: 69 MG/DL (ref 0–100)
NONHDLC SERPL-MCNC: 83 MG/DL
TRIGL SERPL-MCNC: 72 MG/DL

## 2024-10-12 PROCEDURE — 36415 COLL VENOUS BLD VENIPUNCTURE: CPT

## 2024-10-12 PROCEDURE — 80061 LIPID PANEL: CPT

## 2024-10-18 ENCOUNTER — TELEPHONE (OUTPATIENT)
Age: 69
End: 2024-10-18

## 2024-10-18 ENCOUNTER — OFFICE VISIT (OUTPATIENT)
Dept: NEUROLOGY | Facility: CLINIC | Age: 69
End: 2024-10-18
Payer: COMMERCIAL

## 2024-10-18 VITALS
HEIGHT: 62 IN | WEIGHT: 174 LBS | SYSTOLIC BLOOD PRESSURE: 124 MMHG | DIASTOLIC BLOOD PRESSURE: 72 MMHG | HEART RATE: 73 BPM | BODY MASS INDEX: 32.02 KG/M2 | TEMPERATURE: 97.9 F

## 2024-10-18 DIAGNOSIS — R41.3 MEMORY LOSS: ICD-10-CM

## 2024-10-18 DIAGNOSIS — G35 MULTIPLE SCLEROSIS (HCC): Primary | ICD-10-CM

## 2024-10-18 DIAGNOSIS — G47.33 OSA (OBSTRUCTIVE SLEEP APNEA): ICD-10-CM

## 2024-10-18 DIAGNOSIS — R41.3 MEMORY LOSS: Primary | ICD-10-CM

## 2024-10-18 PROCEDURE — 99214 OFFICE O/P EST MOD 30 MIN: CPT | Performed by: PSYCHIATRY & NEUROLOGY

## 2024-10-18 RX ORDER — LOSARTAN POTASSIUM 50 MG/1
50 TABLET ORAL DAILY
COMMUNITY
Start: 2024-07-23 | End: 2025-07-23

## 2024-10-18 NOTE — TELEPHONE ENCOUNTER
Ok to update mri neuro quant and neurocognitive testing.  Rxs in emr.  Needs outside neurocogntive referral phonenumbers since not being done here at this time. Please provide to pt.

## 2024-10-18 NOTE — ASSESSMENT & PLAN NOTE
Patient following with Dr. Willard from sleep medicine.  Patient with issue with CPAP machine that was just received.  Device already had preset readings.  Patient sent back device and will be following up with Dr. Willard for re-order.

## 2024-10-18 NOTE — TELEPHONE ENCOUNTER
"Inbound call received from patient.    Patient stated she saw Dr. Moses today but forgot to discuss pt's current concerns with her memory and discuss getting updated imaging.     Pt stated she informed Dr. Moses of her fall but did not mention anything about pt's memory. Stated pt is forgetting more things at work and takes twice as long to complete the tasks which has been occurring over the past few months. Pt informed RN that pt's sister noticed a \"decline\" in pt's memory and is also concerned.     Pt and pt's sister are asking if updated imaging needed and/or any other testing that can be done to help with pt's memory.     Neurocognitive testing completed on 23.    Imagin22: MRI Brain Neuro Quant  22: MRI Brain MS  2/15/21: MRI C-Spine  17: MRI T-Spine    Dr. Moses: please review and advise     Clinical: please call pt back with provider's response.   "

## 2024-10-18 NOTE — TELEPHONE ENCOUNTER
Outbound call placed to patient.     Informed pt that Dr. Moses placed a script for MRI Brain NeuroQuant wo contrast. Provided pt with CS number to call and schedule MRI.     Patient aware that a referral was placed for patient to see Neuropsychology. Advised patient to call her insurance and see what facilities/providers are covered by pt's insurance in this area. Stated to call the office if pt has any issues with finding another provider.  Pt stated that she will do that. Pt had no further questions at this time and was thankful for the call back.

## 2024-10-18 NOTE — ASSESSMENT & PLAN NOTE
No new issues with memory.  No issues at work or with activities of daily life.  No safety issues at home or with driving.  Continue to follow clinically.

## 2024-10-18 NOTE — PROGRESS NOTES
"Ambulatory Visit  Name: Sarah Thompson      : 1955      MRN: [id number]  Encounter Provider: Maryann Moses MD  Encounter Date: 10/18/2024   Encounter department: [facility] [place]    Assessment & Plan  Multiple sclerosis (HCC)  Here today for neuro follow-up.    Patient last seen in April.  No recent hospitalizations.  No Recent infections.  Patient did have a fall while traveling in [place] with her sister.  Patient did have medical attention after the fall.  There was no loss of consciousness.  No signs of concussion per patient.  Patient denies any new MS symptoms.  No recent IV steroids or flare.  exam stable.  Blood pressure and heart rate in the office today stable.  No syncopal or presyncopal episodes.             CURT (obstructive sleep apnea)  Patient following with Dr. Willard from sleep medicine.  Patient with issue with CPAP machine that was just received.  Device already had preset readings.  Patient sent back device and will be following up with Dr. Willard for re-order.         Memory loss  No new issues with memory.  No issues at work or with activities of daily life.  No safety issues at home or with driving.  Continue to follow clinically.             History of Present Illness   HPI    Pt here today for neuro followup.  Pt last seen on 24.    Per last visit \"Since last visit, pt notes no new MS sxs.   Pt did follow up with dermatology for left cheek melanoma.  Pt notes close follow up with her dermatology team.  Pt notes issues at work about the same.  Pt notes taking longer to process and complete job duties.  Pt denies any new MS sxs.   Of note, pt biggest issue has been memory and staying on task.  Neurocognitive testing completed on 23.  Rev in detail all testing results with pt at appt.  Extended appt for review due to multiple comorbidities also contributing to sxs.  Pt testing reveals above ave pre morbid abilities.   Pt with marked diff with " "semantic verbal fluency, and verbal retrieval.  Pt with mild verbal encoding and retention issues and simple auditiory attention difficulties.   Intact processing speed, language, visuospatial skills, executive function and memory for visual materials.   Due to above difficulties, testing most fit MCI.  Exact etiology unknown.   Due to intact executive fx, less likely MS and head injury.  Due to intact visual memory and confrtonatational naming , less likely AD.  Per testing, untreated CURT and mood disorder likely factor. Pt notes being seen by sleep med many yrs ago and needing cpap but not feeling better using it so she stopped.   Rec reconnecting with sleep team for further eval. Pt notes good healing of face from melanoma surgery.   Pt following up with dermatology.\"  Patient here today for neuro follow-up.  Patient last seen on April 18, 2024.  Patient is here for her MS follow-up.  No recent hospitalizations.  No recent infections.  Patient did have recent travel and was seen in June due to a fall while in [place] visiting her sister.  Patient notes no dizziness or vertigo prior to the fall.  No loss of consciousness.  Patient was just out walking with sister and fell.  Patient was seen by medical professional.  Patient notes no concussion or laceration.  Patient did have some facial bruising.  Patient denies any palpitations.  Patient with intermittent history of bradycardia.  Patient is following with her PCP.  Rate today in the office was 73.  Blood pressure 124/72.  Patient denies any other falls or recurrent falls.  No fever or chills.  No headache nausea or vomiting.  No change in bowel or bladder.  No change in speech or swallow.  No change in vision.  No loss of vision or diplopia.  No eye pain.  Patient feels memory is about the same.  No significant issues with activities of daily life.  No issues with judgment or driving.  No issues with her finances.  No safety issues in the home.  Patient had " been seen by speech pathology for some cognitive.  Patient feels that she does not need that at this time.  No history of seizures.  Patient also follows up with sleep medicine.  Patient with known history of obstructive sleep apnea.  Patient had recent CPAP device delivered however patient notes that it was already improving set with readings.  Patient sent device back and did notify the sleep lab.  Patient is working on getting a new order.      The following portions of the patient's history were reviewed and updated as appropriate: allergies, current medications, past family history, past medical history, past social history, past surgical history, and problem list.    Review of Systems   Constitutional:  Negative for appetite change, fatigue and fever.   HENT: Negative.  Negative for hearing loss, tinnitus, trouble swallowing and voice change.    Eyes: Negative.  Negative for photophobia, pain and visual disturbance.   Respiratory: Negative.  Negative for shortness of breath.    Cardiovascular: Negative.  Negative for palpitations.   Gastrointestinal: Negative.  Negative for nausea and vomiting.   Endocrine: Negative.  Negative for cold intolerance.   Genitourinary: Negative.  Negative for dysuria, frequency and urgency.   Musculoskeletal:  Negative for back pain, gait problem, myalgias, neck pain and neck stiffness.        Fell in June, hit face went to urgent care in [place]. All good no inpatient   Skin: Negative.  Negative for rash.   Allergic/Immunologic: Negative.    Neurological: Negative.  Negative for dizziness, tremors, seizures, syncope, facial asymmetry, speech difficulty, weakness, light-headedness, numbness and headaches.   Hematological: Negative.  Does not bruise/bleed easily.   Psychiatric/Behavioral: Negative.  Negative for confusion, hallucinations and sleep disturbance.    All other systems reviewed and are negative.    I have personally reviewed the MA's review of systems and made  "changes as necessary.      Objective     /72   Pulse 73   Temp 97.9 °F (36.6 °C)   Ht 5' 1.5\" (1.562 m)   Wt 78.9 kg (174 lb)   BMI 32.34 kg/m²     Physical Exam  Constitutional:       General: She is not in acute distress.     Appearance: She is not ill-appearing.   Musculoskeletal:      Right lower leg: No edema.      Left lower leg: No edema.   Neurological:      Mental Status: She is alert and oriented to person, place, and time.      Cranial Nerves: Cranial nerves 2-12 are intact.      Motor: Motor strength is normal.     Coordination: Finger-Nose-Finger Test normal.      Gait: Gait is intact.      Deep Tendon Reflexes:      Reflex Scores:       Tricep reflexes are 2+ on the right side and 2+ on the left side.       Bicep reflexes are 2+ on the right side and 2+ on the left side.       Brachioradialis reflexes are 2+ on the right side and 2+ on the left side.       Patellar reflexes are 2+ on the right side and 2+ on the left side.       Achilles reflexes are 2+ on the right side and 2+ on the left side.  Psychiatric:         Speech: Speech normal.       Neurologic Exam     Mental Status   Oriented to person, place, and time.   Speech: speech is normal   Level of consciousness: alert  Knowledge: good.     Cranial Nerves   Cranial nerves II through XII intact.     Motor Exam   Muscle bulk: normal  Overall muscle tone: normal  Right arm tone: normal  Left arm tone: normal  Right arm pronator drift: absent  Left arm pronator drift: absent  Right leg tone: normal  Left leg tone: normal    Strength   Strength 5/5 throughout.     Sensory Exam   Light touch normal.     Gait, Coordination, and Reflexes     Gait  Gait: normal    Coordination   Finger to nose coordination: normal    Tremor   Resting tremor: absent  Intention tremor: absent  Action tremor: absent    Reflexes   Right brachioradialis: 2+  Left brachioradialis: 2+  Right biceps: 2+  Left biceps: 2+  Right triceps: 2+  Left triceps: 2+  Right " patellar: 2+  Left patellar: 2+  Right achilles: 2+  Left achilles: 2+  Right : 2+  Left : 2+  Right plantar: normal  Left plantar: normal  Right Salas: absent  Left Salas: absent  Right ankle clonus: absent  Left ankle clonus: absent

## 2024-11-01 ENCOUNTER — TELEPHONE (OUTPATIENT)
Age: 69
End: 2024-11-01

## 2024-11-01 NOTE — TELEPHONE ENCOUNTER
Writer spoke to patient in regards to ROF. Writer emailed outside resources to patient. Writer then closed referral due to not having a provider to complete the requested service.

## 2025-01-08 ENCOUNTER — TELEPHONE (OUTPATIENT)
Dept: HEMATOLOGY ONCOLOGY | Facility: CLINIC | Age: 70
End: 2025-01-08

## 2025-01-08 NOTE — TELEPHONE ENCOUNTER
Informed Patient of Upcoming appt on Mon 1/8 with Dr. Godfrey, Patient has labs that needs done prior to Visit.    [place] # was given for any question, comment or concerns.

## 2025-01-13 ENCOUNTER — APPOINTMENT (OUTPATIENT)
Dept: LAB | Facility: CLINIC | Age: 70
End: 2025-01-13
Payer: COMMERCIAL

## 2025-01-13 ENCOUNTER — OFFICE VISIT (OUTPATIENT)
Dept: HEMATOLOGY ONCOLOGY | Facility: CLINIC | Age: 70
End: 2025-01-13
Payer: COMMERCIAL

## 2025-01-13 ENCOUNTER — TELEPHONE (OUTPATIENT)
Dept: HEMATOLOGY ONCOLOGY | Facility: CLINIC | Age: 70
End: 2025-01-13

## 2025-01-13 ENCOUNTER — RESULTS FOLLOW-UP (OUTPATIENT)
Dept: HEMATOLOGY ONCOLOGY | Facility: CLINIC | Age: 70
End: 2025-01-13

## 2025-01-13 VITALS
HEIGHT: 61 IN | DIASTOLIC BLOOD PRESSURE: 88 MMHG | RESPIRATION RATE: 18 BRPM | OXYGEN SATURATION: 97 % | HEART RATE: 72 BPM | SYSTOLIC BLOOD PRESSURE: 140 MMHG | TEMPERATURE: 97.7 F | WEIGHT: 181 LBS | BODY MASS INDEX: 34.17 KG/M2

## 2025-01-13 DIAGNOSIS — Z85.820 ENCOUNTER FOR FOLLOW-UP SURVEILLANCE OF MELANOMA: Primary | ICD-10-CM

## 2025-01-13 DIAGNOSIS — Z08 ENCOUNTER FOR FOLLOW-UP SURVEILLANCE OF MELANOMA: Primary | ICD-10-CM

## 2025-01-13 DIAGNOSIS — C43.30 MALIGNANT MELANOMA OF FACE (HCC): ICD-10-CM

## 2025-01-13 LAB
ALBUMIN SERPL BCG-MCNC: 4.5 G/DL (ref 3.5–5)
ALP SERPL-CCNC: 89 U/L (ref 34–104)
ALT SERPL W P-5'-P-CCNC: 49 U/L (ref 7–52)
ANION GAP SERPL CALCULATED.3IONS-SCNC: 4 MMOL/L (ref 4–13)
AST SERPL W P-5'-P-CCNC: 31 U/L (ref 13–39)
BASOPHILS # BLD AUTO: 0.03 THOUSANDS/ΜL (ref 0–0.1)
BASOPHILS NFR BLD AUTO: 1 % (ref 0–1)
BILIRUB SERPL-MCNC: 0.81 MG/DL (ref 0.2–1)
BUN SERPL-MCNC: 16 MG/DL (ref 5–25)
CALCIUM SERPL-MCNC: 9.5 MG/DL (ref 8.4–10.2)
CHLORIDE SERPL-SCNC: 106 MMOL/L (ref 96–108)
CO2 SERPL-SCNC: 31 MMOL/L (ref 21–32)
CREAT SERPL-MCNC: 0.7 MG/DL (ref 0.6–1.3)
EOSINOPHIL # BLD AUTO: 0.13 THOUSAND/ΜL (ref 0–0.61)
EOSINOPHIL NFR BLD AUTO: 3 % (ref 0–6)
ERYTHROCYTE [DISTWIDTH] IN BLOOD BY AUTOMATED COUNT: 11.9 % (ref 11.6–15.1)
GFR SERPL CREATININE-BSD FRML MDRD: 87 ML/MIN/1.73SQ M
GLUCOSE P FAST SERPL-MCNC: 110 MG/DL (ref 65–99)
HCT VFR BLD AUTO: 41 % (ref 34.8–46.1)
HGB BLD-MCNC: 14.1 G/DL (ref 11.5–15.4)
IMM GRANULOCYTES # BLD AUTO: 0.01 THOUSAND/UL (ref 0–0.2)
IMM GRANULOCYTES NFR BLD AUTO: 0 % (ref 0–2)
LDH SERPL-CCNC: 162 U/L (ref 140–271)
LYMPHOCYTES # BLD AUTO: 1.74 THOUSANDS/ΜL (ref 0.6–4.47)
LYMPHOCYTES NFR BLD AUTO: 36 % (ref 14–44)
MCH RBC QN AUTO: 33.6 PG (ref 26.8–34.3)
MCHC RBC AUTO-ENTMCNC: 34.4 G/DL (ref 31.4–37.4)
MCV RBC AUTO: 98 FL (ref 82–98)
MONOCYTES # BLD AUTO: 0.4 THOUSAND/ΜL (ref 0.17–1.22)
MONOCYTES NFR BLD AUTO: 8 % (ref 4–12)
NEUTROPHILS # BLD AUTO: 2.56 THOUSANDS/ΜL (ref 1.85–7.62)
NEUTS SEG NFR BLD AUTO: 52 % (ref 43–75)
NRBC BLD AUTO-RTO: 0 /100 WBCS
PLATELET # BLD AUTO: 210 THOUSANDS/UL (ref 149–390)
PMV BLD AUTO: 10 FL (ref 8.9–12.7)
POTASSIUM SERPL-SCNC: 4.3 MMOL/L (ref 3.5–5.3)
PROT SERPL-MCNC: 7.1 G/DL (ref 6.4–8.4)
RBC # BLD AUTO: 4.2 MILLION/UL (ref 3.81–5.12)
SODIUM SERPL-SCNC: 141 MMOL/L (ref 135–147)
WBC # BLD AUTO: 4.87 THOUSAND/UL (ref 4.31–10.16)

## 2025-01-13 PROCEDURE — 99213 OFFICE O/P EST LOW 20 MIN: CPT | Performed by: INTERNAL MEDICINE

## 2025-01-13 PROCEDURE — 80053 COMPREHEN METABOLIC PANEL: CPT

## 2025-01-13 PROCEDURE — 36415 COLL VENOUS BLD VENIPUNCTURE: CPT

## 2025-01-13 PROCEDURE — 85025 COMPLETE CBC W/AUTO DIFF WBC: CPT

## 2025-01-13 PROCEDURE — 83615 LACTATE (LD) (LDH) ENZYME: CPT

## 2025-01-13 NOTE — TELEPHONE ENCOUNTER
Called patient to make her aware that lab results were reviewed by Dr. Godfrey and all look good  Patient appreciative of the call and verbalized understanding

## 2025-01-13 NOTE — LETTER
2025     Naomie Wilson, DO  [address]  [address]st [address]  [facility] [address]    Patient: Sarah Thompson   YOB: 1955   Date of Visit: 2025       Dear Dr. Wilson:    Thank you for referring Sarah Thompson to me for evaluation. Below are my notes for this consultation.    If you have questions, please do not hesitate to call me. I look forward to following your patient along with you.         Sincerely,        Juliet Godfrey MD        CC: No Recipients    Juliet Godfrey MD  2025  8:51 AM  Sign when Signing Visit  Name: Sarah Thompson      : 1955      MRN: [id number]  Encounter Provider: Juliet Godfrey MD  Encounter Date: 2025   Encounter department: [facility] [place]  :  Assessment & Plan  Encounter for follow-up surveillance of melanoma  Clinically no evidence of melanoma recurrence.  Scar is well-healed.  She will get blood work after today's visit.  She will continue to follow with dermatology.  She knows to monitor for any new, changing, concerning skin lesions or lymphadenopathy.  Orders placed and prescription provided for blood work to be done today and at her follow-up in 6 months.  She will return at that time.  She knows to call with issues or concerns prior to her next visit.       Malignant melanoma of face (HCC)  Clinically no evidence of melanoma recurrence.  Scar is well-healed.  She will get blood work after today's visit.  She will continue to follow with dermatology.  She knows to monitor for any new, changing, concerning skin lesions or lymphadenopathy.  Orders placed and prescription provided for blood work to be done today and at her follow-up in 6 months.  She will return at that time.  She knows to call with issues or concerns prior to her next visit.  Orders:  •  CBC and differential; Standing  •  Comprehensive metabolic panel; Standing  •  LD,Blood;  Standing        History of Present Illness  Chief Complaint   Patient presents with   • Follow-up     Doing well.  No issues concerns or complaints.  She did fast so she get her blood work after the visit.  Denies any new, changing, concerning skin lesions.  Denies lymphadenopathy.  No issues with her scar on her face at this time.      Oncology History  Cancer Staging   Malignant melanoma of face (HCC)  Staging form: Melanoma of the Skin, AJCC 8th Edition  - Clinical stage from 2/20/2023: Stage IA (cT1a, cN0, cM0) - Signed by Juliet Godfrey MD on 6/8/2023  Oncology History   Malignant melanoma of face (HCC)   2/20/2023 -  Cancer Staged    Staging form: Melanoma of the Skin, AJCC 8th Edition  - Clinical stage from 2/20/2023: Stage IA (cT1a, cN0, cM0) - Signed by Juliet Godfrey MD on 6/8/2023 6/8/2023 Initial Diagnosis    Malignant melanoma of face (HCC)         Review of Systems   Constitutional:  Negative for activity change, chills, diaphoresis, fatigue, fever and unexpected weight change.   HENT:  Negative for congestion, hearing loss, trouble swallowing and voice change.    Respiratory:  Negative for cough, shortness of breath and wheezing.    Cardiovascular:  Negative for chest pain, palpitations and leg swelling.   Gastrointestinal:  Negative for abdominal distention, abdominal pain, constipation, diarrhea, nausea and vomiting.   Musculoskeletal:  Negative for arthralgias and myalgias.   Skin:  Negative for color change and rash.   Neurological:  Negative for dizziness, seizures, speech difficulty, weakness, light-headedness and headaches.   Hematological:  Negative for adenopathy.     Current Outpatient Medications on File Prior to Visit   Medication Sig Dispense Refill   • albuterol (PROVENTIL HFA,VENTOLIN HFA) 90 mcg/act inhaler Inhale 1 puff every 4 (four) hours as needed      • amLODIPine (NORVASC) 5 mg tablet Take 5 mg by mouth daily     • Digestive Enzymes (DIGESTIVE ENZYME ULTRA PO) Use  "daily     • fluticasone (FLONASE) 50 mcg/act nasal spray SPRAY 2 SPRAYS INTO EACH NOSTRIL EVERY DAY     • Homeopathic Products (SIMILASAN DRY EYE RELIEF) SOLN Apply to eye if needed     • Loratadine 10 MG CAPS Take 10 mg by mouth as needed     • losartan (COZAAR) 50 mg tablet Take 50 mg by mouth daily     • Multiple Vitamins-Minerals (MULTIVITAMIN ADULT PO) 2 (two) times a day      • omeprazole (PriLOSEC) 20 mg delayed release capsule Take 20 mg by mouth daily     • [DISCONTINUED] metoprolol succinate (TOPROL-XL) 50 mg 24 hr tablet Take 50 mg by mouth daily     • [DISCONTINUED] cycloSPORINE (RESTASIS) 0.05 % ophthalmic emulsion Apply 0.05 % to eye if needed     • [DISCONTINUED] zolpidem (AMBIEN) 5 mg tablet Take 1 tablet (5 mg total) by mouth as needed for sleep (for use during sleep study) for up to 1 day 1 tablet 0     No current facility-administered medications on file prior to visit.          Objective  /88 (BP Location: Right arm, Patient Position: Sitting, Cuff Size: Standard)   Pulse 72   Temp 97.7 °F (36.5 °C) (Tympanic)   Resp 18   Ht 5' 1\" (1.549 m)   Wt 82.1 kg (181 lb)   SpO2 97%   BMI 34.20 kg/m²     Pain Screening:  Pain Score: 0-No pain  ECOG ECOG Performance Status: 0 - Fully active, able to carry on all pre-disease performance without restriction   Physical Exam  Constitutional:       General: She is not in acute distress.     Appearance: Normal appearance. She is not ill-appearing.   HENT:      Head: Normocephalic and atraumatic.      Right Ear: External ear normal.      Left Ear: External ear normal.      Nose: Nose normal. No congestion.      Mouth/Throat:      Mouth: Mucous membranes are moist.      Pharynx: Oropharynx is clear. No oropharyngeal exudate.   Eyes:      General: No scleral icterus.        Right eye: No discharge.         Left eye: No discharge.      Conjunctiva/sclera: Conjunctivae normal.   Cardiovascular:      Rate and Rhythm: Normal rate and regular rhythm.      " Pulses: Normal pulses.      Heart sounds: No murmur heard.     No friction rub. No gallop.   Pulmonary:      Effort: Pulmonary effort is normal. No respiratory distress.      Breath sounds: Normal breath sounds. No wheezing or rales.   Abdominal:      General: Bowel sounds are normal. There is no distension.      Palpations: There is no mass.      Tenderness: There is no abdominal tenderness. There is no rebound.   Musculoskeletal:         General: No swelling or tenderness. Normal range of motion.      Cervical back: Normal range of motion. No rigidity.      Right lower leg: No edema.      Left lower leg: No edema.   Lymphadenopathy:      Head:      Right side of head: No submental, submandibular, preauricular, posterior auricular or occipital adenopathy.      Left side of head: No submental, submandibular, preauricular, posterior auricular or occipital adenopathy.      Cervical: No cervical adenopathy.      Right cervical: No superficial or posterior cervical adenopathy.     Left cervical: No superficial or posterior cervical adenopathy.      Upper Body:      Right upper body: No supraclavicular or axillary adenopathy.      Left upper body: No supraclavicular or axillary adenopathy.   Skin:     General: Skin is warm.      Coloration: Skin is not jaundiced.      Findings: No lesion or rash.      Comments: Scar well healed.  No evidence of recurrence at primary site.     Neurological:      General: No focal deficit present.      Mental Status: She is alert and oriented to person, place, and time.      Cranial Nerves: No cranial nerve deficit.      Motor: No weakness.      Gait: Gait normal.   Psychiatric:         Mood and Affect: Mood normal.         Behavior: Behavior normal.         Thought Content: Thought content normal.         Judgment: Judgment normal.         Labs: I have reviewed the following labs:  Lab Results   Component Value Date/Time    WBC 5.60 07/10/2024 12:02 PM    RBC 4.32 07/10/2024 12:02 PM     Hemoglobin 14.0 07/10/2024 12:02 PM    Hematocrit 41.7 07/10/2024 12:02 PM    MCV 97 07/10/2024 12:02 PM    MCH 32.4 07/10/2024 12:02 PM    RDW 11.9 07/10/2024 12:02 PM    Platelets 245 07/10/2024 12:02 PM    Segmented % 45 07/10/2024 12:02 PM    Lymphocytes % 44 07/10/2024 12:02 PM    Monocytes % 8 07/10/2024 12:02 PM    Eosinophils Relative 2 07/10/2024 12:02 PM    Basophils Relative 1 07/10/2024 12:02 PM    Immature Grans % 0 07/10/2024 12:02 PM    Absolute Neutrophils 2.56 07/10/2024 12:02 PM     Lab Results   Component Value Date/Time    Potassium 3.7 07/10/2024 12:02 PM    Chloride 104 07/10/2024 12:02 PM    CO2 30 07/10/2024 12:02 PM    BUN 15 07/10/2024 12:02 PM    Creatinine 0.66 07/10/2024 12:02 PM    Calcium 9.8 07/10/2024 12:02 PM    AST 17 07/10/2024 12:02 PM    ALT 16 07/10/2024 12:02 PM    Alkaline Phosphatase 70 07/10/2024 12:02 PM    Total Protein 7.1 07/10/2024 12:02 PM    Albumin 4.5 07/10/2024 12:02 PM    Total Bilirubin 0.68 07/10/2024 12:02 PM    eGFR 89 07/10/2024 12:02 PM     Lab Results   Component Value Date/Time     07/10/2024 12:02 PM        Radiology Results Review : No pertinent imaging studies reviewed.        Juliet Godfrey MD, PhD

## 2025-01-13 NOTE — ASSESSMENT & PLAN NOTE
Clinically no evidence of melanoma recurrence.  Scar is well-healed.  She will get blood work after today's visit.  She will continue to follow with dermatology.  She knows to monitor for any new, changing, concerning skin lesions or lymphadenopathy.  Orders placed and prescription provided for blood work to be done today and at her follow-up in 6 months.  She will return at that time.  She knows to call with issues or concerns prior to her next visit.  Orders:    CBC and differential; Standing    Comprehensive metabolic panel; Standing    LD,Blood; Standing

## 2025-01-13 NOTE — PROGRESS NOTES
Name: Sarah Thompson      : 1955      MRN: [id number]  Encounter Provider: Juliet Godfrey MD  Encounter Date: 2025   Encounter department: [facility]  :  Assessment & Plan  Encounter for follow-up surveillance of melanoma  Clinically no evidence of melanoma recurrence.  Scar is well-healed.  She will get blood work after today's visit.  She will continue to follow with dermatology.  She knows to monitor for any new, changing, concerning skin lesions or lymphadenopathy.  Orders placed and prescription provided for blood work to be done today and at her follow-up in 6 months.  She will return at that time.  She knows to call with issues or concerns prior to her next visit.       Malignant melanoma of face (HCC)  Clinically no evidence of melanoma recurrence.  Scar is well-healed.  She will get blood work after today's visit.  She will continue to follow with dermatology.  She knows to monitor for any new, changing, concerning skin lesions or lymphadenopathy.  Orders placed and prescription provided for blood work to be done today and at her follow-up in 6 months.  She will return at that time.  She knows to call with issues or concerns prior to her next visit.  Orders:    CBC and differential; Standing    Comprehensive metabolic panel; Standing    LD,Blood; Standing        History of Present Illness   Chief Complaint   Patient presents with    Follow-up     Doing well.  No issues concerns or complaints.  She did fast so she get her blood work after the visit.  Denies any new, changing, concerning skin lesions.  Denies lymphadenopathy.  No issues with her scar on her face at this time.      Oncology History   Cancer Staging   Malignant melanoma of face (HCC)  Staging form: Melanoma of the Skin, AJCC 8th Edition  - Clinical stage from 2023: Stage IA (cT1a, cN0, cM0) - Signed by Juliet Godfrey MD on 2023  Oncology History   Malignant melanoma of face  (HCC)   2/20/2023 -  Cancer Staged    Staging form: Melanoma of the Skin, AJCC 8th Edition  - Clinical stage from 2/20/2023: Stage IA (cT1a, cN0, cM0) - Signed by Juliet Godfrey MD on 6/8/2023 6/8/2023 Initial Diagnosis    Malignant melanoma of face (HCC)          Review of Systems   Constitutional:  Negative for activity change, chills, diaphoresis, fatigue, fever and unexpected weight change.   HENT:  Negative for congestion, hearing loss, trouble swallowing and voice change.    Respiratory:  Negative for cough, shortness of breath and wheezing.    Cardiovascular:  Negative for chest pain, palpitations and leg swelling.   Gastrointestinal:  Negative for abdominal distention, abdominal pain, constipation, diarrhea, nausea and vomiting.   Musculoskeletal:  Negative for arthralgias and myalgias.   Skin:  Negative for color change and rash.   Neurological:  Negative for dizziness, seizures, speech difficulty, weakness, light-headedness and headaches.   Hematological:  Negative for adenopathy.     Current Outpatient Medications on File Prior to Visit   Medication Sig Dispense Refill    albuterol (PROVENTIL HFA,VENTOLIN HFA) 90 mcg/act inhaler Inhale 1 puff every 4 (four) hours as needed       amLODIPine (NORVASC) 5 mg tablet Take 5 mg by mouth daily      Digestive Enzymes (DIGESTIVE ENZYME ULTRA PO) Use daily      fluticasone (FLONASE) 50 mcg/act nasal spray SPRAY 2 SPRAYS INTO EACH NOSTRIL EVERY DAY      Homeopathic Products (SIMILASAN DRY EYE RELIEF) SOLN Apply to eye if needed      Loratadine 10 MG CAPS Take 10 mg by mouth as needed      losartan (COZAAR) 50 mg tablet Take 50 mg by mouth daily      Multiple Vitamins-Minerals (MULTIVITAMIN ADULT PO) 2 (two) times a day       omeprazole (PriLOSEC) 20 mg delayed release capsule Take 20 mg by mouth daily      [DISCONTINUED] metoprolol succinate (TOPROL-XL) 50 mg 24 hr tablet Take 50 mg by mouth daily      [DISCONTINUED] cycloSPORINE (RESTASIS) 0.05 % ophthalmic  "emulsion Apply 0.05 % to eye if needed      [DISCONTINUED] zolpidem (AMBIEN) 5 mg tablet Take 1 tablet (5 mg total) by mouth as needed for sleep (for use during sleep study) for up to 1 day 1 tablet 0     No current facility-administered medications on file prior to visit.          Objective   /88 (BP Location: Right arm, Patient Position: Sitting, Cuff Size: Standard)   Pulse 72   Temp 97.7 °F (36.5 °C) (Tympanic)   Resp 18   Ht 5' 1\" (1.549 m)   Wt 82.1 kg (181 lb)   SpO2 97%   BMI 34.20 kg/m²     Pain Screening:  Pain Score: 0-No pain  ECOG ECOG Performance Status: 0 - Fully active, able to carry on all pre-disease performance without restriction   Physical Exam  Constitutional:       General: She is not in acute distress.     Appearance: Normal appearance. She is not ill-appearing.   HENT:      Head: Normocephalic and atraumatic.      Right Ear: External ear normal.      Left Ear: External ear normal.      Nose: Nose normal. No congestion.      Mouth/Throat:      Mouth: Mucous membranes are moist.      Pharynx: Oropharynx is clear. No oropharyngeal exudate.   Eyes:      General: No scleral icterus.        Right eye: No discharge.         Left eye: No discharge.      Conjunctiva/sclera: Conjunctivae normal.   Cardiovascular:      Rate and Rhythm: Normal rate and regular rhythm.      Pulses: Normal pulses.      Heart sounds: No murmur heard.     No friction rub. No gallop.   Pulmonary:      Effort: Pulmonary effort is normal. No respiratory distress.      Breath sounds: Normal breath sounds. No wheezing or rales.   Abdominal:      General: Bowel sounds are normal. There is no distension.      Palpations: There is no mass.      Tenderness: There is no abdominal tenderness. There is no rebound.   Musculoskeletal:         General: No swelling or tenderness. Normal range of motion.      Cervical back: Normal range of motion. No rigidity.      Right lower leg: No edema.      Left lower leg: No edema. "   Lymphadenopathy:      Head:      Right side of head: No submental, submandibular, preauricular, posterior auricular or occipital adenopathy.      Left side of head: No submental, submandibular, preauricular, posterior auricular or occipital adenopathy.      Cervical: No cervical adenopathy.      Right cervical: No superficial or posterior cervical adenopathy.     Left cervical: No superficial or posterior cervical adenopathy.      Upper Body:      Right upper body: No supraclavicular or axillary adenopathy.      Left upper body: No supraclavicular or axillary adenopathy.   Skin:     General: Skin is warm.      Coloration: Skin is not jaundiced.      Findings: No lesion or rash.      Comments: Scar well healed.  No evidence of recurrence at primary site.     Neurological:      General: No focal deficit present.      Mental Status: She is alert and oriented to person, place, and time.      Cranial Nerves: No cranial nerve deficit.      Motor: No weakness.      Gait: Gait normal.   Psychiatric:         Mood and Affect: Mood normal.         Behavior: Behavior normal.         Thought Content: Thought content normal.         Judgment: Judgment normal.         Labs: I have reviewed the following labs:  Lab Results   Component Value Date/Time    WBC 5.60 07/10/2024 12:02 PM    RBC 4.32 07/10/2024 12:02 PM    Hemoglobin 14.0 07/10/2024 12:02 PM    Hematocrit 41.7 07/10/2024 12:02 PM    MCV 97 07/10/2024 12:02 PM    MCH 32.4 07/10/2024 12:02 PM    RDW 11.9 07/10/2024 12:02 PM    Platelets 245 07/10/2024 12:02 PM    Segmented % 45 07/10/2024 12:02 PM    Lymphocytes % 44 07/10/2024 12:02 PM    Monocytes % 8 07/10/2024 12:02 PM    Eosinophils Relative 2 07/10/2024 12:02 PM    Basophils Relative 1 07/10/2024 12:02 PM    Immature Grans % 0 07/10/2024 12:02 PM    Absolute Neutrophils 2.56 07/10/2024 12:02 PM     Lab Results   Component Value Date/Time    Potassium 3.7 07/10/2024 12:02 PM    Chloride 104 07/10/2024 12:02 PM    CO2  30 07/10/2024 12:02 PM    BUN 15 07/10/2024 12:02 PM    Creatinine 0.66 07/10/2024 12:02 PM    Calcium 9.8 07/10/2024 12:02 PM    AST 17 07/10/2024 12:02 PM    ALT 16 07/10/2024 12:02 PM    Alkaline Phosphatase 70 07/10/2024 12:02 PM    Total Protein 7.1 07/10/2024 12:02 PM    Albumin 4.5 07/10/2024 12:02 PM    Total Bilirubin 0.68 07/10/2024 12:02 PM    eGFR 89 07/10/2024 12:02 PM     Lab Results   Component Value Date/Time     07/10/2024 12:02 PM        Radiology Results Review : No pertinent imaging studies reviewed.        Juliet Godfrey MD, PhD

## 2025-01-16 ENCOUNTER — TELEPHONE (OUTPATIENT)
Dept: NEUROLOGY | Facility: CLINIC | Age: 70
End: 2025-01-16

## 2025-01-16 NOTE — TELEPHONE ENCOUNTER
Called patient Dr. Moses will not be in office on the date of their appt. R/S appt. For 4/24/25 8:00[facility]

## 2025-04-24 ENCOUNTER — OFFICE VISIT (OUTPATIENT)
Dept: NEUROLOGY | Facility: CLINIC | Age: 70
End: 2025-04-24
Payer: COMMERCIAL

## 2025-04-24 VITALS
HEIGHT: 61 IN | WEIGHT: 181 LBS | HEART RATE: 57 BPM | SYSTOLIC BLOOD PRESSURE: 146 MMHG | BODY MASS INDEX: 34.17 KG/M2 | TEMPERATURE: 98.1 F | DIASTOLIC BLOOD PRESSURE: 80 MMHG

## 2025-04-24 DIAGNOSIS — R41.3 MEMORY LOSS: ICD-10-CM

## 2025-04-24 DIAGNOSIS — R00.1 BRADYCARDIA: ICD-10-CM

## 2025-04-24 DIAGNOSIS — G35 MULTIPLE SCLEROSIS (HCC): Primary | ICD-10-CM

## 2025-04-24 DIAGNOSIS — R00.1 BRADYCARDIA: Primary | ICD-10-CM

## 2025-04-24 PROCEDURE — 99214 OFFICE O/P EST MOD 30 MIN: CPT | Performed by: PSYCHIATRY & NEUROLOGY

## 2025-04-24 RX ORDER — AMLODIPINE BESYLATE 10 MG/1
1 TABLET ORAL DAILY
COMMUNITY
Start: 2025-04-09

## 2025-04-24 RX ORDER — ATORVASTATIN CALCIUM 40 MG/1
40 TABLET, FILM COATED ORAL DAILY
COMMUNITY
Start: 2025-04-22

## 2025-04-24 NOTE — ASSESSMENT & PLAN NOTE
Stable unchanged.  Did have MRI neuro quant ordered in the interval period. Has not been completed yet.  Advised patient to obtain.

## 2025-04-24 NOTE — ASSESSMENT & PLAN NOTE
Patient here today for MS follow-up  Patient last seen in October  No recent hospitalizations or infections  No recent falls or trips since her last visit.  No new MS symptoms. Denies numbness tingling weakness.  No recent need for IV steroids or flares.  Examination stable  No syncopal or presyncopal episodes since last office visit

## 2025-04-24 NOTE — ASSESSMENT & PLAN NOTE
History of documented bradycardia but has not been evaluated by cardiologist  Referral placed as patient persistently bradycardic in the office.  Currently asymptomatic however. Yes

## 2025-04-24 NOTE — PROGRESS NOTES
"Name: Sarah Thompson      : 1955      MRN: [id number]  Encounter Provider: Maryann Moses MD  Encounter Date: 2025   Encounter department: [facility]  :  Assessment & Plan  Multiple sclerosis (HCC)  Patient here today for MS follow-up  Patient last seen in October  No recent hospitalizations or infections  No recent falls or trips since her last visit.  No new MS symptoms. Denies numbness tingling weakness.  No recent need for IV steroids or flares.  Examination stable  No syncopal or presyncopal episodes since last office visit       Memory loss  Stable unchanged.  Did have MRI neuro quant ordered in the interval period. Has not been completed yet.  Advised patient to obtain.       Bradycardia  History of documented bradycardia but has not been evaluated by cardiologist  Referral placed as patient persistently bradycardic in the office.  Currently asymptomatic however. Yes         History of Present Illness   HPI 70-year-old female here for MS follow-up. Overall since her last office visit reports stable. No new complaints. No signs or symptoms of MS flare in the interval period. No weakness, numbness, tingling. No recent falls or trips. No recent hospitalizations or need for IV steroids. No falls or presyncopal events. No urinary symptoms. Overall reports that she is well. Memory stable able to perform her job without any difficulties. Sometimes will need to write things down Respules repeat things but overall stable. Has not had MRI neuro quant done she was last seen. Bradycardic in office.       Per last visit \"Since last visit, pt notes no new MS sxs.   Pt did follow up with dermatology for left cheek melanoma.  Pt notes close follow up with her dermatology team.  Pt notes issues at work about the same.  Pt notes taking longer to process and complete job duties.  Pt denies any new MS sxs.   Of note, pt biggest issue has been memory and staying on task.  " "Neurocognitive testing completed on 9/26/23.  Rev in detail all testing results with pt at appt.  Extended appt for review due to multiple comorbidities also contributing to sxs.  Pt testing reveals above ave pre morbid abilities.   Pt with marked diff with semantic verbal fluency, and verbal retrieval.  Pt with mild verbal encoding and retention issues and simple auditiory attention difficulties.   Intact processing speed, language, visuospatial skills, executive function and memory for visual materials.   Due to above difficulties, testing most fit MCI.  Exact etiology unknown.   Due to intact executive fx, less likely MS and head injury.  Due to intact visual memory and confrtonatational naming , less likely AD.  Per testing, untreated CURT and mood disorder likely factor. Pt notes being seen by sleep med many yrs ago and needing cpap but not feeling better using it so she stopped.   Rec reconnecting with sleep team for further eval. Pt notes good healing of face from melanoma surgery.   Pt following up with dermatology.\"  Patient here today for neuro follow-up.  Patient last seen on April 18, 2024.  Patient is here for her MS follow-up.  No recent hospitalizations.  No recent infections.  Patient did have recent travel and was seen in June due to a fall while in [place] visiting her sister.  Patient notes no dizziness or vertigo prior to the fall.  No loss of consciousness.  Patient was just out walking with sister and fell.  Patient was seen by medical professional.  Patient notes no concussion or laceration.  Patient did have some facial bruising.  Patient denies any palpitations.  Patient with intermittent history of bradycardia.  Patient is following with her PCP.  Rate today in the office was 73.  Blood pressure 124/72.  Patient denies any other falls or recurrent falls.  No fever or chills.  No headache nausea or vomiting.  No change in bowel or bladder.  No change in speech or swallow.  No change in " vision.  No loss of vision or diplopia.  No eye pain.  Patient feels memory is about the same.  No significant issues with activities of daily life.  No issues with judgment or driving.  No issues with her finances.  No safety issues in the home.  Patient had been seen by speech pathology for some cognitive.  Patient feels that she does not need that at this time.  No history of seizures.  Patient also follows up with sleep medicine.  Patient with known history of obstructive sleep apnea.  Patient had recent CPAP device delivered however patient notes that it was already improving set with readings.  Patient sent device back and did notify the sleep lab.  Patient is working on getting a new order.  Review of Systems   Constitutional:  Negative for appetite change, fatigue and fever.   HENT: Negative.  Negative for hearing loss, tinnitus, trouble swallowing and voice change.    Eyes: Negative.  Negative for photophobia, pain and visual disturbance.   Respiratory: Negative.  Negative for shortness of breath.    Cardiovascular: Negative.  Negative for palpitations.   Gastrointestinal: Negative.  Negative for nausea and vomiting.   Endocrine: Negative.  Negative for cold intolerance.   Genitourinary: Negative.  Negative for dysuria, frequency and urgency.   Musculoskeletal:  Negative for back pain, gait problem, myalgias, neck pain and neck stiffness.   Skin: Negative.  Negative for rash.   Allergic/Immunologic: Negative.    Neurological:  Negative for dizziness, tremors, seizures, syncope, facial asymmetry, speech difficulty, weakness, light-headedness, numbness and headaches.   Hematological: Negative.  Does not bruise/bleed easily.   Psychiatric/Behavioral: Negative.  Negative for confusion, hallucinations and sleep disturbance.    All other systems reviewed and are negative.   I have personally reviewed the MA's review of systems and made changes as necessary.  No new issues to address  Medical History Reviewed by  provider this encounter:     .  Past Medical History   Past Medical History:   Diagnosis Date    Asthma     Hypertension     Melanoma (HCC) 02/20/2023    Left upper cheek    Multiple sclerosis (HCC)     Post concussive syndrome     Psychiatric disorder      Past Surgical History:   Procedure Laterality Date    CATARACT EXTRACTION Left [facility]27/2022    [facility]    Left upper cheek-Dr. Mamadou Avelar     Family History   Problem Relation Age of Onset    Heart failure Mother     Pneumonia Mother     Heart failure Father     Muscular dystrophy Father     Pneumonia Father       reports that she has never smoked. She has never used smokeless tobacco. She reports current alcohol use. She reports that she does not use drugs.  Current Outpatient Medications   Medication Instructions    albuterol (PROVENTIL HFA,VENTOLIN HFA) 90 mcg/act inhaler 1 puff, Every 4 hours PRN    amLODIPine (NORVASC) 10 mg tablet 1 tablet, Daily    atorvastatin (LIPITOR) 40 mg, Daily    Digestive Enzymes (DIGESTIVE ENZYME ULTRA PO) Daily    fluticasone (FLONASE) 50 mcg/act nasal spray SPRAY 2 SPRAYS INTO EACH NOSTRIL EVERY DAY    Homeopathic Products (SIMILASAN DRY EYE RELIEF) SOLN As needed    Loratadine 10 mg, As needed    losartan (COZAAR) 50 mg, Daily    Multiple Vitamins-Minerals (MULTIVITAMIN ADULT PO) 2 times daily    omeprazole (PRILOSEC) 20 mg, Daily     Allergies   Allergen Reactions    Sulfa Antibiotics Anaphylaxis and Nausea Only    Bee Venom Other (See Comments)     Other reaction(s): fatal-hospitalized as child    Other reaction(s): fatal-hospitalized as child   Other reaction(s): fatal-hospitalized as child    Latex     Misc. Sulfonamide Containing Compounds Other (See Comments)      Current Outpatient Medications on File Prior to Visit   Medication Sig Dispense Refill    albuterol (PROVENTIL HFA,VENTOLIN HFA) 90 mcg/act inhaler Inhale 1 puff every 4 (four) hours as needed       amLODIPine (NORVASC) 10 mg tablet Take 1  "tablet by mouth in the morning      atorvastatin (LIPITOR) 40 mg tablet Take 40 mg by mouth daily      Digestive Enzymes (DIGESTIVE ENZYME ULTRA PO) Use daily      losartan (COZAAR) 50 mg tablet Take 50 mg by mouth daily      Multiple Vitamins-Minerals (MULTIVITAMIN ADULT PO) 2 (two) times a day       omeprazole (PriLOSEC) 20 mg delayed release capsule Take 20 mg by mouth daily      [DISCONTINUED] amLODIPine (NORVASC) 5 mg tablet Take 5 mg by mouth daily      fluticasone (FLONASE) 50 mcg/act nasal spray SPRAY 2 SPRAYS INTO EACH NOSTRIL EVERY DAY (Patient not taking: Reported on 4/24/2025)      Homeopathic Products (SIMILASAN DRY EYE RELIEF) SOLN Apply to eye if needed (Patient not taking: Reported on 4/24/2025)      Loratadine 10 MG CAPS Take 10 mg by mouth as needed (Patient not taking: Reported on 4/24/2025)       No current facility-administered medications on file prior to visit.      Social History     Tobacco Use    Smoking status: Never    Smokeless tobacco: Never   Vaping Use    Vaping status: Never Used   Substance and Sexual Activity    Alcohol use: Yes     Comment: very infrequent    Drug use: No    Sexual activity: Not on file        Objective   /80   Pulse 57   Temp 98.1 °F (36.7 °C)   Ht 5' 1\" (1.549 m)   Wt 82.1 kg (181 lb)   BMI 34.20 kg/m²    GENERAL EXAM:  General Appearance: in no apparent distress, well developed and well nourished, non-toxic, and in no respiratory distress and acyanotic  HENT: Normocephalic and atraumatic. Nose and Ears normal.   Neck: Full range of motion with no pain or limitations in flexion, extension, lateral flexion and rotation  Cardiovascular: Distal extremities warm without palpable edema or tenderness, no observed significant swelling.   Pulmonary: Pulmonary effort is normal. Not in respiratory distress  Musculoskeletal: No swelling or deformity.  Skin: Warm and dry  Psychiatric: Normal behavior and appropriate affect     NEUROLOGIC  EXAM:  MENTAL STATUS: "   Alertness: alert  Orientation: time, date, person, place, city, president  Speech/Language Normal Rate, Tone, Rhythm, Clear, No Dysarthria , Coherent, Fluent, and Able to identify/Name both Low/High Frequency Objects  Commands: Can follow multistep commands  Current and Remote Memory:intact  Affect: normal  Thought content exhibits logical connections    CRANIAL NERVES:  I Not tested   II Visual Fields normal   II, Ill,  IV, VI Pupils equal, round, reactive to light and accommodation  EOM full and intact   V facial sensation was normal and symmetrical   VII facial symmetry equal   VIII Normal hearing to speech   IX, X Normal Palatal Elevation, No Uvular Deviation   XI Shoulder shrug and head turn is normal   XII Midline tongue protrusion      MOTOR:   Pronator Drift - Absent  Atrophy - No atrophy or muscle wasting  Normal Tone  No Involuntary Movements  No Tremors Appreciated  ARM RIGHT  LEFT LEG RIGHT  LEFT   Deltoid 5/5 5/5 lliopsoas 5/5 5/5   Biceps 5/5 5/5 Quads 5/5 5/5   Triceps 5/5 5/5 Hamstrings 5/5 5/5   Wrist Extension 5/5 5/5 Ankle Dorsiflexion 5/5 5/5   Wrist Flexion 5/5 5/5 Ankle Plantarflexion 5/5 5/5     REFLEXES:   Reflexes are normal and symmetric  DTR's are 2/4 in all tested locations  Ankle Clonus: Absent  Babinski: Bilateral toes downgoing  Salas Sign: Absent     BICEP   TRICEPS   BRACHIO   PATELLAR   ACHILLES   RIGHT 2+ 2+ 2+ 2+ 2+   LEFT 2+ 2+ 2+ 2+ 2+     SENSORY:  Normal sensation to light touch, pinprick, vibration, temperature, and proprioception in all limbs, romberg negative    COORDINATION:   Fast Alternative Movements:  R - Intact, L - Intact  Finger To Nose:  R - Intact, L - Intact, Heel To Shin: R - Intact, L - Intact  Romberg Test: normal    STATION/GAIT: Assistive Device - None, Rises from Chair - Without Assistance, Normal Gait, Base - Normal, Stride Length - Normal, Queenie - Normal, Arm Swing - Normal, Turn Around - Normal, Normal Tandem Gait, and Normal Heel/Toe Walking

## 2025-04-30 ENCOUNTER — TELEPHONE (OUTPATIENT)
Age: 70
End: 2025-04-30

## 2025-04-30 NOTE — TELEPHONE ENCOUNTER
Patient asking if MRI ordered by Dr. Moses should also include contrast; said her sister (who is a ) was concerned with patient's cognitive function and was questioning why contrast wasn't ordered.    Per chart review, current order reads: MRI brain NeuroQuant wo contrast.    Shared name of test with patient; confirmed it is specialized MRI ordered to assess memory loss.  Typically ordered wo contrast.    Dr. Moses, please confirm contrast is not needed on MRI, thank you.

## 2025-04-30 NOTE — TELEPHONE ENCOUNTER
This is a specific test for memory looking at hippocampal volume.  Typically only use the dye in this test if worrried about tumor. Pt has had multiple imaging in past with no evidence of tumor.  Several in past already with contrast.  Ok to do mri neuroquant as ordered without contrast. Radiology will alert if any contrast concerns needed

## 2025-05-01 NOTE — TELEPHONE ENCOUNTER
Reviewed message below. Patient verbalized understanding and thankful for the call back. No further questions at this time.

## 2025-05-16 ENCOUNTER — HOSPITAL ENCOUNTER (OUTPATIENT)
Dept: RADIOLOGY | Facility: HOSPITAL | Age: 70
Discharge: HOME | End: 2025-05-16
Attending: PSYCHIATRY & NEUROLOGY
Payer: COMMERCIAL

## 2025-05-16 DIAGNOSIS — R41.3 MEMORY LOSS: ICD-10-CM

## 2025-05-16 PROCEDURE — 70551 MRI BRAIN STEM W/O DYE: CPT

## 2025-05-19 ENCOUNTER — RESULTS FOLLOW-UP (OUTPATIENT)
Dept: NEUROLOGY | Facility: CLINIC | Age: 70
End: 2025-05-19

## 2025-06-13 ENCOUNTER — TELEPHONE (OUTPATIENT)
Age: 70
End: 2025-06-13

## 2025-06-13 NOTE — TELEPHONE ENCOUNTER
Pt called to find out what provider referred her to cardiology.  Informed pt it was her neurologist Dr. Moses.  She verbalized understanding, and confirmed appt date/time.

## 2025-06-17 ENCOUNTER — CONSULT (OUTPATIENT)
Dept: CARDIOLOGY CLINIC | Facility: CLINIC | Age: 70
End: 2025-06-17
Attending: PSYCHIATRY & NEUROLOGY
Payer: COMMERCIAL

## 2025-06-17 VITALS
BODY MASS INDEX: 33.88 KG/M2 | DIASTOLIC BLOOD PRESSURE: 80 MMHG | SYSTOLIC BLOOD PRESSURE: 125 MMHG | HEART RATE: 78 BPM | WEIGHT: 179.3 LBS | OXYGEN SATURATION: 98 %

## 2025-06-17 DIAGNOSIS — I10 HYPERTENSION, UNSPECIFIED TYPE: ICD-10-CM

## 2025-06-17 DIAGNOSIS — R00.1 BRADYCARDIA: Primary | ICD-10-CM

## 2025-06-17 DIAGNOSIS — E66.01 SEVERE OBESITY WITH BODY MASS INDEX (BMI) OF 35.0 TO 39.9 WITH COMORBIDITY (HCC): ICD-10-CM

## 2025-06-17 DIAGNOSIS — E78.5 HYPERLIPIDEMIA, UNSPECIFIED HYPERLIPIDEMIA TYPE: ICD-10-CM

## 2025-06-17 DIAGNOSIS — I77.810 ASCENDING AORTA DILATION (HCC): ICD-10-CM

## 2025-06-17 LAB
ATRIAL RATE: 72 BPM
P AXIS: 53 DEGREES
PR INTERVAL: 154 MS
QRS AXIS: 14 DEGREES
QRSD INTERVAL: 80 MS
QT INTERVAL: 382 MS
QTC INTERVAL: 418 MS
T WAVE AXIS: 36 DEGREES
VENTRICULAR RATE: 72 BPM

## 2025-06-17 PROCEDURE — 99204 OFFICE O/P NEW MOD 45 MIN: CPT | Performed by: INTERNAL MEDICINE

## 2025-06-17 PROCEDURE — 93000 ELECTROCARDIOGRAM COMPLETE: CPT | Performed by: INTERNAL MEDICINE

## 2025-06-17 NOTE — PROGRESS NOTES
.[facility] Cardiology  Office Consultation  Sarah Thompson 70 y.o. female MRN: [id number]        Chief Complaint    Chief Complaint   Patient presents with    New Patient Visit     No active cardiac concerns. Pt has family hx of CHF(father) and MI ( both sides).        Referring Provider: Maryann Moses MD    Impression & Plan:    1. Bradycardia (Primary)  -The patient was recently noted to have bradycardia with a heart rate in the 50s and was referred to cardiology for further evaluation.  Today her heart rate was normal at 78 bpm.  Overall she is mostly asymptomatic but does report occasional episodes of lightheadedness and occasional episodes of falling but denies any syncopal episodes.  Will obtain a 48-hour Holter monitor to assess the extent of the bradycardia as well as to assess for any concerning pauses or arrhythmias.  Will check an echocardiogram as well.  - Ambulatory Referral to Cardiology  - POCT ECG  - Echo complete w/ contrast if indicated; Future  - Holter monitor; Future    2. Ascending aorta dilation (HCC)  -Last echocardiogram in September 2023 showing a mildly dilated ascending aorta at 3.7 cm.  Will repeat an echo for further surveillance.  - Echo complete w/ contrast if indicated; Future    3. Severe obesity with body mass index (BMI) of 35.0 to 39.9 with comorbidity (HCC)  -The patient was counseled on proper diet and exercise.    4. Hyperlipidemia, unspecified hyperlipidemia type  -Last lipid panel from October 2024 showing an LDL cholesterol of 69 which is at goal.  Recommend continuing Lipitor 40 mg daily.    5. Hypertension, unspecified type  -Initial BP was elevated today but repeat BP was well-controlled at 125/80.  Will continue amlodipine 10 mg daily and losartan 50 mg daily.      We will see Sarah Thompson back in 6 months for routine follow-up.    HPI: Sarah Thompson is a 70 y.o. year old female with a history of multiple sclerosis, CURT, mildly dilated ascending aorta,  hypertension and hyperlipidemia who presented to the cardiology office as a new patient.  She was referred by her neurologist who noted that she was bradycardic during her recent visit with a heart rate of 57 bpm.     Today overall the patient reports feeling well.  She does report some dyspnea on significant exertion and occasional episodes of lightheadedness but otherwise denies any other exertional symptoms.  She also reports some episodes of falling but without loss of consciousness.  She denies any chest pain or chest pressure.  She denies any palpitations or leg swelling.  Initial BP was elevated at 142/82 but improved to 125/80 on recheck.  Heart rate was normal today at 78 bpm.  She reports that she used to exercise frequently when she was younger but has not exercised in nearly 10 years.      Cardiac testing:   Echocardiogram 9/25/2023: Mild concentric hypertrophy, EF 65%, ascending aorta 3.7 cm in diameter.      EKG reviewed personally:   3/17/2023: Sinus bradycardia 52 bpm.  Today: Sinus rhythm.    Relevant Laboratory Studies:  (Laboratory studies personally reviewed)  Lipid panel 10/12/2024: Cholesterol 134, TG 72, HDL 51, LDL 69.    Review of Systems   Constitutional:  Negative for activity change, appetite change, chills, diaphoresis, fatigue and fever.   HENT:  Negative for congestion, ear discharge, ear pain, hearing loss, sinus pressure, sinus pain and sore throat.    Eyes:  Negative for pain, discharge, redness and itching.   Respiratory:  Negative for cough, chest tightness, shortness of breath and wheezing.    Cardiovascular:  Negative for chest pain, palpitations and leg swelling.   Gastrointestinal:  Negative for abdominal distention, abdominal pain, blood in stool, constipation, diarrhea, nausea and vomiting.   Genitourinary:  Negative for difficulty urinating, dysuria, flank pain and frequency.   Musculoskeletal:  Negative for arthralgias, back pain and gait problem.   Skin:  Negative for  "color change, pallor and rash.   Neurological:  Negative for dizziness, weakness, light-headedness, numbness and headaches.   Psychiatric/Behavioral:  Negative for agitation, behavioral problems, confusion and decreased concentration.          Past Medical History[1]  Past Surgical History[2]  Social History     Substance and Sexual Activity   Alcohol Use Yes    Comment: very infrequent     Social History     Substance and Sexual Activity   Drug Use No     Tobacco Use History[3]  Family History[4]    Allergies:  Allergies[5]    Medications (as of START of this encounter):   Medications Prior to Visit[6]      Vitals:    06/17/25 1602   BP: 125/80   Pulse: 78   SpO2: 98%     Weight (last 2 days)       Date/Time Weight    06/17/25 1602 81.3 (179.3)            General: Sarah Thompson is a well appearing female, in no acute distress, sitting comfortably  HEENT: moist mucous membranes, EOMI  Neck:  No JVD, supple, trachea midline   Cardiovascular: unremarkable S1/S2, regular rate and rhythm, no murmurs, rubs or gallops   Pulmonary: normal respiratory effort, CTAB   Abdomen: soft and nondistended  Extremities: No lower extremity edema. Warm and well perfused extremities.   Neuro: no focal motor deficits, AAOx3 (person, place, time)  Psych: Normal mood and affect, cooperative        Time Spent:  Total time (face-to-face and non-face-to-face) spent on today's visit was 45 minutes. This includes preparation for the visits (i.e. reviewing test results), performance of a medically appropriate history and examination, and orders for medications, tests or other procedures. This time is exclusive of procedures performed and time spent teaching.    Jeremy Pérez MD    This note was completed in part utilizing RABBL recognition software. Grammatical errors, random word insertion, spelling mistakes, occasional wrong word or \"sound-alike\" substitutions and incomplete sentences may be an occasional consequence of the " system secondary to software limitations, ambient noise and hardware issues. At the time of dictation, efforts were made to edit, clarify and /or correct errors.  Please read the chart carefully and recognize, using context, where substitutions have occurred.  If you have any questions or concerns about the context, text or information contained within the body of this dictation, please contact myself, the provider, for further clarification.          [1]   Past Medical History:  Diagnosis Date    Asthma     Hypertension     Melanoma (HCC) 02/20/2023    Left upper cheek    Multiple sclerosis (HCC)     Post concussive syndrome     Psychiatric disorder    [2]   Past Surgical History:  Procedure Laterality Date    CATARACT EXTRACTION [place]/2022    [facility]    Left upper cheek-Dr. Mamadou Avelar   [3]   Social History  Tobacco Use   Smoking Status Never   Smokeless Tobacco Never   [4]   Family History  Problem Relation Name Age of Onset    Heart failure Mother      Pneumonia Mother      Heart failure Father      Muscular dystrophy Father      Pneumonia Father     [5]   Allergies  Allergen Reactions    Sulfa Antibiotics Anaphylaxis and Nausea Only    Bee Venom Other (See Comments)     Other reaction(s): fatal-hospitalized as child    Other reaction(s): fatal-hospitalized as child   Other reaction(s): fatal-hospitalized as child    Latex     Misc. Sulfonamide Containing Compounds Other (See Comments)   [6]   Outpatient Medications Prior to Visit   Medication Sig Dispense Refill    albuterol (PROVENTIL HFA,VENTOLIN HFA) 90 mcg/act inhaler Inhale 1 puff every 4 (four) hours as needed      amLODIPine (NORVASC) 10 mg tablet Take 1 tablet by mouth in the morning      atorvastatin (LIPITOR) 40 mg tablet Take 40 mg by mouth in the morning.      Digestive Enzymes (DIGESTIVE ENZYME ULTRA PO) Use in the morning.      losartan (COZAAR) 50 mg tablet Take 50 mg by mouth in the morning.      Multiple  Vitamins-Minerals (MULTIVITAMIN ADULT PO) in the morning and in the evening.      omeprazole (PriLOSEC) 20 mg delayed release capsule Take 20 mg by mouth in the morning.      fluticasone (FLONASE) 50 mcg/act nasal spray SPRAY 2 SPRAYS INTO EACH NOSTRIL EVERY DAY (Patient not taking: Reported on 4/24/2025)      Homeopathic Products (SIMILASAN DRY EYE RELIEF) SOLN Apply to eye if needed (Patient not taking: Reported on 4/24/2025)      Loratadine 10 MG CAPS Take 10 mg by mouth as needed (Patient not taking: Reported on 4/24/2025)       No facility-administered medications prior to visit.

## 2025-07-07 ENCOUNTER — TELEPHONE (OUTPATIENT)
Dept: HEMATOLOGY ONCOLOGY | Facility: CLINIC | Age: 70
End: 2025-07-07

## 2025-07-07 NOTE — TELEPHONE ENCOUNTER
Left message on patient's phone indicating to have labs drawn prior to appointment.  Indicated that the scripts are in the system, the tests are non-fasting and that patient can go to any [facility] facility to have the labs drawn.  Provided callback number [phone].

## 2025-07-14 ENCOUNTER — OFFICE VISIT (OUTPATIENT)
Dept: HEMATOLOGY ONCOLOGY | Facility: CLINIC | Age: 70
End: 2025-07-14
Payer: COMMERCIAL

## 2025-07-14 VITALS
OXYGEN SATURATION: 97 % | SYSTOLIC BLOOD PRESSURE: 130 MMHG | BODY MASS INDEX: 34.17 KG/M2 | HEIGHT: 61 IN | WEIGHT: 181 LBS | HEART RATE: 65 BPM | DIASTOLIC BLOOD PRESSURE: 88 MMHG | TEMPERATURE: 97.9 F | RESPIRATION RATE: 16 BRPM

## 2025-07-14 DIAGNOSIS — Z85.820 ENCOUNTER FOR FOLLOW-UP SURVEILLANCE OF MELANOMA: Primary | ICD-10-CM

## 2025-07-14 DIAGNOSIS — Z08 ENCOUNTER FOR FOLLOW-UP SURVEILLANCE OF MELANOMA: Primary | ICD-10-CM

## 2025-07-14 DIAGNOSIS — C43.30 MALIGNANT MELANOMA OF FACE (HCC): ICD-10-CM

## 2025-07-14 PROCEDURE — G2211 COMPLEX E/M VISIT ADD ON: HCPCS | Performed by: INTERNAL MEDICINE

## 2025-07-14 PROCEDURE — 99213 OFFICE O/P EST LOW 20 MIN: CPT | Performed by: INTERNAL MEDICINE

## 2025-07-14 NOTE — ASSESSMENT & PLAN NOTE
Ms. Thompson is a 70-year-old female with stage Ia melanoma here for continued monitoring, follow-up and surveillance.  Originally diagnosed in 02/2023.  No clinical evidence of melanoma recurrence.  Recommended to follow up with dermatology due to increased risk for another melanoma or other skin cancers. Blood work will be ordered and can be completed at her convenience within the next week or two. Results will be communicated upon receipt.  She knows to call with any issues or concerns prior to her next visit.  She will follow-up in 6 months.  Orders:    CBC and differential; Standing    Comprehensive metabolic panel; Standing    LD,Blood; Standing

## 2025-07-14 NOTE — PROGRESS NOTES
Name: Sarah Thompson      : 1955      MRN: [id number]  Encounter Provider: Juliet Godfrey MD  Encounter Date: 2025   Encounter department: [facility]  :  Assessment & Plan  Encounter for follow-up surveillance of melanoma  Ms. Thompson is a 70-year-old female with stage Ia melanoma here for continued monitoring, follow-up and surveillance.  Originally diagnosed in 2023.  No clinical evidence of melanoma recurrence.  Recommended to follow up with dermatology due to increased risk for another melanoma or other skin cancers. Blood work will be ordered and can be completed at her convenience within the next week or two. Results will be communicated upon receipt.  She knows to call with any issues or concerns prior to her next visit.  She will follow-up in 6 months.       Malignant melanoma of face (HCC)  Ms. Thompson is a 70-year-old female with stage Ia melanoma here for continued monitoring, follow-up and surveillance.  Originally diagnosed in 2023.  No clinical evidence of melanoma recurrence.  Recommended to follow up with dermatology due to increased risk for another melanoma or other skin cancers. Blood work will be ordered and can be completed at her convenience within the next week or two. Results will be communicated upon receipt.  She knows to call with any issues or concerns prior to her next visit.  She will follow-up in 6 months.  Orders:    CBC and differential; Standing    Comprehensive metabolic panel; Standing    LD,Blood; Standing        Return in about 6 months (around 2026) for Office Visit, labs.    History of Present Illness   Chief Complaint   Patient presents with    Follow-up     History of Present Illness  The patient is a 70-year-old female with stage Ia melanoma, here for continued monitoring and follow-up on surveillance. She was originally diagnosed in 2023, approximately 2.5 years ago, and does not appear to have gotten  blood work prior to today's visit.    She reports no changes in her condition since the last visit. Although she has not started an exercise program, she plans to incorporate walking into her routine. Her energy levels are satisfactory, though she wishes for more enthusiasm. She is not experiencing significant fatigue but is preoccupied with her sister's severe medical condition, which requires frequent hospital visits.    No new skin abnormalities such as lumps or bumps have been noticed. She has not had a recent consultation with a dermatologist and has not undergone any recent blood tests.    Scheduled for a DEXA scan on Friday.    FAMILY HISTORY  Her sister is suffering deeply from medical issues and is almost living at the hospitals now.  Oncology History   Cancer Staging   Malignant melanoma of face (HCC)  Staging form: Melanoma of the Skin, AJCC 8th Edition  - Clinical stage from 2/20/2023: Stage IA (cT1a, cN0, cM0) - Signed by Juliet Godfrey MD on 6/8/2023  Oncology History   Malignant melanoma of face (HCC)   2/20/2023 -  Cancer Staged    Staging form: Melanoma of the Skin, AJCC 8th Edition  - Clinical stage from 2/20/2023: Stage IA (cT1a, cN0, cM0) - Signed by Juliet Godfrey MD on 6/8/2023 6/8/2023 Initial Diagnosis    Malignant melanoma of face (HCC)             Review of Systems   Constitutional:  Negative for activity change, chills, diaphoresis, fatigue, fever and unexpected weight change.   HENT:  Negative for congestion, hearing loss, trouble swallowing and voice change.    Respiratory:  Negative for cough, shortness of breath and wheezing.    Cardiovascular:  Negative for chest pain, palpitations and leg swelling.   Gastrointestinal:  Negative for abdominal distention, abdominal pain, constipation, diarrhea, nausea and vomiting.   Musculoskeletal:  Negative for arthralgias and myalgias.   Skin:  Negative for color change and rash.   Neurological:  Negative for dizziness, seizures, speech  "difficulty, weakness, light-headedness and headaches.   Hematological:  Negative for adenopathy.     Medications Ordered Prior to Encounter[1]       Objective   /88 (BP Location: Left arm, Patient Position: Sitting, Cuff Size: Adult)   Pulse 65   Temp 97.9 °F (36.6 °C) (Temporal)   Resp 16   Ht 5' 1\" (1.549 m)   Wt 82.1 kg (181 lb)   SpO2 97%   BMI 34.20 kg/m²     Pain Screening:  Pain Score: 0-No pain  ECOG ECOG Performance Status: 0 - Fully active, able to carry on all pre-disease performance without restriction     Physical Exam  Constitutional:       General: She is not in acute distress.     Appearance: Normal appearance. She is not ill-appearing.   HENT:      Head: Normocephalic and atraumatic.      Right Ear: External ear normal.      Left Ear: External ear normal.      Nose: Nose normal. No congestion.      Mouth/Throat:      Mouth: Mucous membranes are moist.      Pharynx: Oropharynx is clear. No oropharyngeal exudate.     Eyes:      General: No scleral icterus.        Right eye: No discharge.         Left eye: No discharge.      Conjunctiva/sclera: Conjunctivae normal.       Cardiovascular:      Rate and Rhythm: Normal rate and regular rhythm.      Pulses: Normal pulses.      Heart sounds: No murmur heard.     No friction rub. No gallop.   Pulmonary:      Effort: Pulmonary effort is normal. No respiratory distress.      Breath sounds: Normal breath sounds. No wheezing or rales.   Abdominal:      General: Bowel sounds are normal. There is no distension.      Palpations: There is no mass.      Tenderness: There is no abdominal tenderness. There is no rebound.     Musculoskeletal:         General: No swelling or tenderness. Normal range of motion.      Cervical back: Normal range of motion. No rigidity.      Right lower leg: No edema.      Left lower leg: No edema.   Lymphadenopathy:      Head:      Right side of head: No submental, submandibular, preauricular, posterior auricular or occipital " adenopathy.      Left side of head: No submental, submandibular, preauricular, posterior auricular or occipital adenopathy.      Cervical: No cervical adenopathy.      Right cervical: No superficial or posterior cervical adenopathy.     Left cervical: No superficial or posterior cervical adenopathy.      Upper Body:      Right upper body: No supraclavicular or axillary adenopathy.      Left upper body: No supraclavicular or axillary adenopathy.      Lower Body: No right inguinal adenopathy. No left inguinal adenopathy.     Skin:     General: Skin is warm.      Coloration: Skin is not jaundiced.      Findings: No lesion or rash.      Comments: Scar well healed.  No evidence of recurrence at primary site.  No concerning skin lesions     Neurological:      General: No focal deficit present.      Mental Status: She is alert and oriented to person, place, and time.      Cranial Nerves: No cranial nerve deficit.      Motor: No weakness.      Gait: Gait normal.     Psychiatric:         Mood and Affect: Mood normal.         Behavior: Behavior normal.         Thought Content: Thought content normal.         Judgment: Judgment normal.       Physical Exam  Integument/Skin: No lumps, bumps, or concerning skin lesions noted upon inspection.    Results    Labs: I have reviewed the following labs:  Lab Results   Component Value Date/Time    WBC 4.87 01/13/2025 09:02 AM    RBC 4.20 01/13/2025 09:02 AM    Hemoglobin 14.1 01/13/2025 09:02 AM    Hematocrit 41.0 01/13/2025 09:02 AM    MCV 98 01/13/2025 09:02 AM    MCH 33.6 01/13/2025 09:02 AM    RDW 11.9 01/13/2025 09:02 AM    Platelets 210 01/13/2025 09:02 AM    Segmented % 52 01/13/2025 09:02 AM    Lymphocytes % 36 01/13/2025 09:02 AM    Monocytes % 8 01/13/2025 09:02 AM    Eosinophils Relative 3 01/13/2025 09:02 AM    Basophils Relative 1 01/13/2025 09:02 AM    Immature Grans % 0 01/13/2025 09:02 AM    Absolute Neutrophils 2.56 01/13/2025 09:02 AM     Lab Results   Component Value  Date/Time    Potassium 4.3 01/13/2025 09:02 AM    Chloride 106 01/13/2025 09:02 AM    CO2 31 01/13/2025 09:02 AM    BUN 16 01/13/2025 09:02 AM    Creatinine 0.70 01/13/2025 09:02 AM    Glucose, Fasting 110 (H) 01/13/2025 09:02 AM    Calcium 9.5 01/13/2025 09:02 AM    AST 31 01/13/2025 09:02 AM    ALT 49 01/13/2025 09:02 AM    Alkaline Phosphatase 89 01/13/2025 09:02 AM    Total Protein 7.1 01/13/2025 09:02 AM    Albumin 4.5 01/13/2025 09:02 AM    Total Bilirubin 0.81 01/13/2025 09:02 AM    eGFR 87 01/13/2025 09:02 AM     Lab Results   Component Value Date/Time     01/13/2025 09:02 AM        Radiology Results Review : No pertinent imaging studies reviewed.        Juliet Godfrey MD, PhD         [1]   Current Outpatient Medications on File Prior to Visit   Medication Sig Dispense Refill    albuterol (PROVENTIL HFA,VENTOLIN HFA) 90 mcg/act inhaler Inhale 1 puff every 4 (four) hours as needed      amLODIPine (NORVASC) 10 mg tablet Take 1 tablet by mouth in the morning      atorvastatin (LIPITOR) 40 mg tablet Take 40 mg by mouth in the morning.      Digestive Enzymes (DIGESTIVE ENZYME ULTRA PO) Use in the morning.      losartan (COZAAR) 50 mg tablet Take 50 mg by mouth in the morning.      Multiple Vitamins-Minerals (MULTIVITAMIN ADULT PO) in the morning and in the evening.      omeprazole (PriLOSEC) 20 mg delayed release capsule Take 20 mg by mouth in the morning.      Homeopathic Products (SIMILASAN DRY EYE RELIEF) SOLN Apply to eye if needed (Patient not taking: Reported on 4/24/2025)      Loratadine 10 MG CAPS Take 10 mg by mouth as needed (Patient not taking: Reported on 4/24/2025)      [DISCONTINUED] fluticasone (FLONASE) 50 mcg/act nasal spray SPRAY 2 SPRAYS INTO EACH NOSTRIL EVERY DAY (Patient not taking: Reported on 4/24/2025)       No current facility-administered medications on file prior to visit.

## 2025-07-14 NOTE — LETTER
2025     Naomie Wilson DO  [address] [facility]  1st Floor  [facility] [address]    Patient: Sarah Thompson   YOB: 1955   Date of Visit: 2025       Dear Dr. Naomie Wilson DO:    Thank you for referring Sarah Thompson to me for evaluation. Below are my notes for this consultation.    If you have questions, please do not hesitate to call me. I look forward to following your patient along with you.         Sincerely,        Juliet Godfrey MD        CC: No Recipients    Juliet Godfrey MD  2025  9:05 AM  Sign when Signing Visit  Name: Sarah Thompson      : 1955      MRN: [id number]  Encounter Provider: Juliet Godfrey MD  Encounter Date: 2025   Encounter department: [facility]  :  Assessment & Plan  Encounter for follow-up surveillance of melanoma  Ms. Thompson is a 70-year-old female with stage Ia melanoma here for continued monitoring, follow-up and surveillance.  Originally diagnosed in 2023.  No clinical evidence of melanoma recurrence.  Recommended to follow up with dermatology due to increased risk for another melanoma or other skin cancers. Blood work will be ordered and can be completed at her convenience within the next week or two. Results will be communicated upon receipt.  She knows to call with any issues or concerns prior to her next visit.  She will follow-up in 6 months.       Malignant melanoma of face (HCC)  Ms. Thompson is a 70-year-old female with stage Ia melanoma here for continued monitoring, follow-up and surveillance.  Originally diagnosed in 2023.  No clinical evidence of melanoma recurrence.  Recommended to follow up with dermatology due to increased risk for another melanoma or other skin cancers. Blood work will be ordered and can be completed at her convenience within the next week or two. Results will be communicated upon receipt.  She knows to call with  any issues or concerns prior to her next visit.  She will follow-up in 6 months.  Orders:  •  CBC and differential; Standing  •  Comprehensive metabolic panel; Standing  •  LD,Blood; Standing        Return in about 6 months (around 1/14/2026) for Office Visit, labs.    History of Present Illness  Chief Complaint   Patient presents with   • Follow-up     History of Present Illness  The patient is a 70-year-old female with stage Ia melanoma, here for continued monitoring and follow-up on surveillance. She was originally diagnosed in 02/2023, approximately 2.5 years ago, and does not appear to have gotten blood work prior to today's visit.    She reports no changes in her condition since the last visit. Although she has not started an exercise program, she plans to incorporate walking into her routine. Her energy levels are satisfactory, though she wishes for more enthusiasm. She is not experiencing significant fatigue but is preoccupied with her sister's severe medical condition, which requires frequent hospital visits.    No new skin abnormalities such as lumps or bumps have been noticed. She has not had a recent consultation with a dermatologist and has not undergone any recent blood tests.    Scheduled for a DEXA scan on Friday.    FAMILY HISTORY  Her sister is suffering deeply from medical issues and is almost living at the hospitals now.  Oncology History  Cancer Staging   Malignant melanoma of face (HCC)  Staging form: Melanoma of the Skin, AJCC 8th Edition  - Clinical stage from 2/20/2023: Stage IA (cT1a, cN0, cM0) - Signed by Juliet Godfrey MD on 6/8/2023  Oncology History   Malignant melanoma of face (HCC)   2/20/2023 -  Cancer Staged    Staging form: Melanoma of the Skin, AJCC 8th Edition  - Clinical stage from 2/20/2023: Stage IA (cT1a, cN0, cM0) - Signed by Juliet Godfrey MD on 6/8/2023 6/8/2023 Initial Diagnosis    Malignant melanoma of face (HCC)             Review of Systems  "  Constitutional:  Negative for activity change, chills, diaphoresis, fatigue, fever and unexpected weight change.   HENT:  Negative for congestion, hearing loss, trouble swallowing and voice change.    Respiratory:  Negative for cough, shortness of breath and wheezing.    Cardiovascular:  Negative for chest pain, palpitations and leg swelling.   Gastrointestinal:  Negative for abdominal distention, abdominal pain, constipation, diarrhea, nausea and vomiting.   Musculoskeletal:  Negative for arthralgias and myalgias.   Skin:  Negative for color change and rash.   Neurological:  Negative for dizziness, seizures, speech difficulty, weakness, light-headedness and headaches.   Hematological:  Negative for adenopathy.     Medications Ordered Prior to Encounter[1]       Objective  /88 (BP Location: Left arm, Patient Position: Sitting, Cuff Size: Adult)   Pulse 65   Temp 97.9 °F (36.6 °C) (Temporal)   Resp 16   Ht 5' 1\" (1.549 m)   Wt 82.1 kg (181 lb)   SpO2 97%   BMI 34.20 kg/m²     Pain Screening:  Pain Score: 0-No pain  ECOG ECOG Performance Status: 0 - Fully active, able to carry on all pre-disease performance without restriction     Physical Exam  Constitutional:       General: She is not in acute distress.     Appearance: Normal appearance. She is not ill-appearing.   HENT:      Head: Normocephalic and atraumatic.      Right Ear: External ear normal.      Left Ear: External ear normal.      Nose: Nose normal. No congestion.      Mouth/Throat:      Mouth: Mucous membranes are moist.      Pharynx: Oropharynx is clear. No oropharyngeal exudate.     Eyes:      General: No scleral icterus.        Right eye: No discharge.         Left eye: No discharge.      Conjunctiva/sclera: Conjunctivae normal.       Cardiovascular:      Rate and Rhythm: Normal rate and regular rhythm.      Pulses: Normal pulses.      Heart sounds: No murmur heard.     No friction rub. No gallop.   Pulmonary:      Effort: Pulmonary effort " is normal. No respiratory distress.      Breath sounds: Normal breath sounds. No wheezing or rales.   Abdominal:      General: Bowel sounds are normal. There is no distension.      Palpations: There is no mass.      Tenderness: There is no abdominal tenderness. There is no rebound.     Musculoskeletal:         General: No swelling or tenderness. Normal range of motion.      Cervical back: Normal range of motion. No rigidity.      Right lower leg: No edema.      Left lower leg: No edema.   Lymphadenopathy:      Head:      Right side of head: No submental, submandibular, preauricular, posterior auricular or occipital adenopathy.      Left side of head: No submental, submandibular, preauricular, posterior auricular or occipital adenopathy.      Cervical: No cervical adenopathy.      Right cervical: No superficial or posterior cervical adenopathy.     Left cervical: No superficial or posterior cervical adenopathy.      Upper Body:      Right upper body: No supraclavicular or axillary adenopathy.      Left upper body: No supraclavicular or axillary adenopathy.      Lower Body: No right inguinal adenopathy. No left inguinal adenopathy.     Skin:     General: Skin is warm.      Coloration: Skin is not jaundiced.      Findings: No lesion or rash.      Comments: Scar well healed.  No evidence of recurrence at primary site.  No concerning skin lesions     Neurological:      General: No focal deficit present.      Mental Status: She is alert and oriented to person, place, and time.      Cranial Nerves: No cranial nerve deficit.      Motor: No weakness.      Gait: Gait normal.     Psychiatric:         Mood and Affect: Mood normal.         Behavior: Behavior normal.         Thought Content: Thought content normal.         Judgment: Judgment normal.       Physical Exam  Integument/Skin: No lumps, bumps, or concerning skin lesions noted upon inspection.    Results    Labs: I have reviewed the following labs:  Lab Results    Component Value Date/Time    WBC 4.87 01/13/2025 09:02 AM    RBC 4.20 01/13/2025 09:02 AM    Hemoglobin 14.1 01/13/2025 09:02 AM    Hematocrit 41.0 01/13/2025 09:02 AM    MCV 98 01/13/2025 09:02 AM    MCH 33.6 01/13/2025 09:02 AM    RDW 11.9 01/13/2025 09:02 AM    Platelets 210 01/13/2025 09:02 AM    Segmented % 52 01/13/2025 09:02 AM    Lymphocytes % 36 01/13/2025 09:02 AM    Monocytes % 8 01/13/2025 09:02 AM    Eosinophils Relative 3 01/13/2025 09:02 AM    Basophils Relative 1 01/13/2025 09:02 AM    Immature Grans % 0 01/13/2025 09:02 AM    Absolute Neutrophils 2.56 01/13/2025 09:02 AM     Lab Results   Component Value Date/Time    Potassium 4.3 01/13/2025 09:02 AM    Chloride 106 01/13/2025 09:02 AM    CO2 31 01/13/2025 09:02 AM    BUN 16 01/13/2025 09:02 AM    Creatinine 0.70 01/13/2025 09:02 AM    Glucose, Fasting 110 (H) 01/13/2025 09:02 AM    Calcium 9.5 01/13/2025 09:02 AM    AST 31 01/13/2025 09:02 AM    ALT 49 01/13/2025 09:02 AM    Alkaline Phosphatase 89 01/13/2025 09:02 AM    Total Protein 7.1 01/13/2025 09:02 AM    Albumin 4.5 01/13/2025 09:02 AM    Total Bilirubin 0.81 01/13/2025 09:02 AM    eGFR 87 01/13/2025 09:02 AM     Lab Results   Component Value Date/Time     01/13/2025 09:02 AM        Radiology Results Review : No pertinent imaging studies reviewed.        Juliet Godfrey MD, PhD         [1]   Current Outpatient Medications on File Prior to Visit   Medication Sig Dispense Refill   • albuterol (PROVENTIL HFA,VENTOLIN HFA) 90 mcg/act inhaler Inhale 1 puff every 4 (four) hours as needed     • amLODIPine (NORVASC) 10 mg tablet Take 1 tablet by mouth in the morning     • atorvastatin (LIPITOR) 40 mg tablet Take 40 mg by mouth in the morning.     • Digestive Enzymes (DIGESTIVE ENZYME ULTRA PO) Use in the morning.     • losartan (COZAAR) 50 mg tablet Take 50 mg by mouth in the morning.     • Multiple Vitamins-Minerals (MULTIVITAMIN ADULT PO) in the morning and in the evening.     • omeprazole  (PriLOSEC) 20 mg delayed release capsule Take 20 mg by mouth in the morning.     • Homeopathic Products (SIMILASAN DRY EYE RELIEF) SOLN Apply to eye if needed (Patient not taking: Reported on 4/24/2025)     • Loratadine 10 MG CAPS Take 10 mg by mouth as needed (Patient not taking: Reported on 4/24/2025)     • [DISCONTINUED] fluticasone (FLONASE) 50 mcg/act nasal spray SPRAY 2 SPRAYS INTO EACH NOSTRIL EVERY DAY (Patient not taking: Reported on 4/24/2025)       No current facility-administered medications on file prior to visit.

## 2025-07-19 ENCOUNTER — APPOINTMENT (OUTPATIENT)
Dept: LAB | Facility: CLINIC | Age: 70
End: 2025-07-19
Attending: INTERNAL MEDICINE
Payer: COMMERCIAL

## 2025-07-19 DIAGNOSIS — C43.30 MALIGNANT MELANOMA OF FACE (HCC): ICD-10-CM

## 2025-07-19 LAB
ALBUMIN SERPL BCG-MCNC: 4.2 G/DL (ref 3.5–5)
ALP SERPL-CCNC: 67 U/L (ref 34–104)
ALT SERPL W P-5'-P-CCNC: 33 U/L (ref 7–52)
ANION GAP SERPL CALCULATED.3IONS-SCNC: 8 MMOL/L (ref 4–13)
AST SERPL W P-5'-P-CCNC: 24 U/L (ref 13–39)
BASOPHILS # BLD AUTO: 0.03 THOUSANDS/ÂΜL (ref 0–0.1)
BASOPHILS NFR BLD AUTO: 1 % (ref 0–1)
BILIRUB SERPL-MCNC: 1.07 MG/DL (ref 0.2–1)
BUN SERPL-MCNC: 11 MG/DL (ref 5–25)
CALCIUM SERPL-MCNC: 9.4 MG/DL (ref 8.4–10.2)
CHLORIDE SERPL-SCNC: 104 MMOL/L (ref 96–108)
CO2 SERPL-SCNC: 30 MMOL/L (ref 21–32)
CREAT SERPL-MCNC: 0.66 MG/DL (ref 0.6–1.3)
EOSINOPHIL # BLD AUTO: 0.15 THOUSAND/ÂΜL (ref 0–0.61)
EOSINOPHIL NFR BLD AUTO: 3 % (ref 0–6)
ERYTHROCYTE [DISTWIDTH] IN BLOOD BY AUTOMATED COUNT: 11.8 % (ref 11.6–15.1)
GFR SERPL CREATININE-BSD FRML MDRD: 88 ML/MIN/1.73SQ M
GLUCOSE P FAST SERPL-MCNC: 97 MG/DL (ref 65–99)
HCT VFR BLD AUTO: 40.6 % (ref 34.8–46.1)
HGB BLD-MCNC: 13.4 G/DL (ref 11.5–15.4)
IMM GRANULOCYTES # BLD AUTO: 0.01 THOUSAND/UL (ref 0–0.2)
IMM GRANULOCYTES NFR BLD AUTO: 0 % (ref 0–2)
LDH SERPL-CCNC: 154 U/L (ref 140–271)
LYMPHOCYTES # BLD AUTO: 2.64 THOUSANDS/ÂΜL (ref 0.6–4.47)
LYMPHOCYTES NFR BLD AUTO: 51 % (ref 14–44)
MCH RBC QN AUTO: 32.1 PG (ref 26.8–34.3)
MCHC RBC AUTO-ENTMCNC: 33 G/DL (ref 31.4–37.4)
MCV RBC AUTO: 97 FL (ref 82–98)
MONOCYTES # BLD AUTO: 0.35 THOUSAND/ÂΜL (ref 0.17–1.22)
MONOCYTES NFR BLD AUTO: 7 % (ref 4–12)
NEUTROPHILS # BLD AUTO: 1.96 THOUSANDS/ÂΜL (ref 1.85–7.62)
NEUTS SEG NFR BLD AUTO: 38 % (ref 43–75)
NRBC BLD AUTO-RTO: 0 /100 WBCS
PLATELET # BLD AUTO: 199 THOUSANDS/UL (ref 149–390)
PMV BLD AUTO: 10.2 FL (ref 8.9–12.7)
POTASSIUM SERPL-SCNC: 4.7 MMOL/L (ref 3.5–5.3)
PROT SERPL-MCNC: 6.7 G/DL (ref 6.4–8.4)
RBC # BLD AUTO: 4.18 MILLION/UL (ref 3.81–5.12)
SODIUM SERPL-SCNC: 142 MMOL/L (ref 135–147)
WBC # BLD AUTO: 5.14 THOUSAND/UL (ref 4.31–10.16)

## 2025-07-19 PROCEDURE — 83615 LACTATE (LD) (LDH) ENZYME: CPT

## 2025-07-19 PROCEDURE — 36415 COLL VENOUS BLD VENIPUNCTURE: CPT

## 2025-07-19 PROCEDURE — 85025 COMPLETE CBC W/AUTO DIFF WBC: CPT

## 2025-07-19 PROCEDURE — 80053 COMPREHEN METABOLIC PANEL: CPT

## 2025-07-30 ENCOUNTER — HOSPITAL ENCOUNTER (OUTPATIENT)
Dept: NON INVASIVE DIAGNOSTICS | Facility: CLINIC | Age: 70
Discharge: HOME | End: 2025-07-30
Attending: STUDENT IN AN ORGANIZED HEALTH CARE EDUCATION/TRAINING PROGRAM
Payer: COMMERCIAL

## 2025-07-30 VITALS
DIASTOLIC BLOOD PRESSURE: 88 MMHG | WEIGHT: 181 LBS | BODY MASS INDEX: 34.17 KG/M2 | HEART RATE: 70 BPM | HEIGHT: 61 IN | SYSTOLIC BLOOD PRESSURE: 130 MMHG

## 2025-07-30 DIAGNOSIS — I77.810 ASCENDING AORTA DILATION (HCC): ICD-10-CM

## 2025-07-30 DIAGNOSIS — R00.1 BRADYCARDIA: ICD-10-CM

## 2025-07-30 LAB
AORTIC ROOT: 3.1 CM
ASCENDING AORTA: 3.5 CM
AV LVOT PEAK GRADIENT: 12 MMHG
AV PEAK GRADIENT: 13 MMHG
BSA FOR ECHO PROCEDURE: 1.81 M2
E WAVE DECELERATION TIME: 227 MS
E/A RATIO: 0.66
FRACTIONAL SHORTENING: 42 (ref 28–44)
INTERVENTRICULAR SEPTUM IN DIASTOLE (PARASTERNAL SHORT AXIS VIEW): 1 CM
INTERVENTRICULAR SEPTUM: 1 CM (ref 0.6–1.1)
LAAS-AP2: 16.1 CM2
LAAS-AP4: 12.5 CM2
LEFT ATRIUM SIZE: 2.5 CM
LEFT ATRIUM VOLUME (MOD BIPLANE): 35 ML
LEFT ATRIUM VOLUME INDEX (MOD BIPLANE): 19.3 ML/M2
LEFT INTERNAL DIMENSION IN SYSTOLE: 2.1 CM (ref 2.1–4)
LEFT VENTRICULAR INTERNAL DIMENSION IN DIASTOLE: 3.6 CM (ref 3.5–6)
LEFT VENTRICULAR POSTERIOR WALL IN END DIASTOLE: 0.9 CM
LEFT VENTRICULAR STROKE VOLUME: 39 ML
LV EF US.2D.A4C+ESTIMATED: 67 %
LVSV (TEICH): 39 ML
MV E'TISSUE VEL-SEP: 11 CM/S
MV PEAK A VEL: 0.92 M/S
MV PEAK E VEL: 61 CM/S
MV STENOSIS PRESSURE HALF TIME: 66 MS
MV VALVE AREA P 1/2 METHOD: 3.3
RIGHT ATRIUM AREA SYSTOLE A4C: 10.6 CM2
RIGHT VENTRICLE ID DIMENSION: 3.3 CM
SINOTUBULAR JUNCTION: 3.2 CM
SL CV LEFT ATRIUM LENGTH A2C: 4.9 CM
SL CV LV EF: 60
SL CV PED ECHO LEFT VENTRICLE DIASTOLIC VOLUME (MOD BIPLANE) 2D: 54 ML
SL CV PED ECHO LEFT VENTRICLE SYSTOLIC VOLUME (MOD BIPLANE) 2D: 15 ML
SL CV SINUS OF VALSALVA 2D: 3.2 CM
STJ: 3.2 CM
TRICUSPID ANNULAR PLANE SYSTOLIC EXCURSION: 2.6 CM

## 2025-07-30 PROCEDURE — 93306 TTE W/DOPPLER COMPLETE: CPT

## 2025-07-30 PROCEDURE — 93225 XTRNL ECG REC<48 HRS REC: CPT

## 2025-07-30 PROCEDURE — 93306 TTE W/DOPPLER COMPLETE: CPT | Performed by: INTERNAL MEDICINE

## 2025-07-30 PROCEDURE — 93226 XTRNL ECG REC<48 HR SCAN A/R: CPT

## 2025-08-04 PROCEDURE — 93227 XTRNL ECG REC<48 HR R&I: CPT | Performed by: STUDENT IN AN ORGANIZED HEALTH CARE EDUCATION/TRAINING PROGRAM
